# Patient Record
Sex: MALE | Race: WHITE | NOT HISPANIC OR LATINO | Employment: FULL TIME | URBAN - NONMETROPOLITAN AREA
[De-identification: names, ages, dates, MRNs, and addresses within clinical notes are randomized per-mention and may not be internally consistent; named-entity substitution may affect disease eponyms.]

---

## 2017-04-18 ENCOUNTER — TRANSFERRED RECORDS (OUTPATIENT)
Dept: HEALTH INFORMATION MANAGEMENT | Facility: HOSPITAL | Age: 22
End: 2017-04-18

## 2017-04-18 DIAGNOSIS — F41.9 ANXIETY: ICD-10-CM

## 2017-04-19 NOTE — TELEPHONE ENCOUNTER
Celexa     Last Written Prescription Date: 9/16/16  Last Fill Quantity: 90, # refills: 1  Last Office Visit with [facility] [place]:  5/27/16        Last PHQ-9 score on record=   PHQ-9 SCORE 3/15/2016   Total Score -   Total Score 4

## 2017-04-20 ENCOUNTER — TRANSFERRED RECORDS (OUTPATIENT)
Dept: HEALTH INFORMATION MANAGEMENT | Facility: HOSPITAL | Age: 22
End: 2017-04-20

## 2017-04-20 RX ORDER — CITALOPRAM HYDROBROMIDE 20 MG/1
TABLET ORAL
Qty: 90 TABLET | Refills: 0 | Status: SHIPPED | OUTPATIENT
Start: 2017-04-20 | End: 2017-05-03

## 2017-05-03 ENCOUNTER — OFFICE VISIT (OUTPATIENT)
Dept: FAMILY MEDICINE | Facility: OTHER | Age: 22
End: 2017-05-03
Attending: NURSE PRACTITIONER
Payer: COMMERCIAL

## 2017-05-03 VITALS
TEMPERATURE: 98.3 F | HEART RATE: 63 BPM | BODY MASS INDEX: 24.39 KG/M2 | SYSTOLIC BLOOD PRESSURE: 122 MMHG | WEIGHT: 155.4 LBS | OXYGEN SATURATION: 98 % | DIASTOLIC BLOOD PRESSURE: 74 MMHG | HEIGHT: 67 IN | RESPIRATION RATE: 16 BRPM

## 2017-05-03 DIAGNOSIS — Z86.79 HISTORY OF SINUS TACHYCARDIA: ICD-10-CM

## 2017-05-03 DIAGNOSIS — F41.9 ANXIETY: Primary | ICD-10-CM

## 2017-05-03 DIAGNOSIS — F41.0 PANIC ATTACK: ICD-10-CM

## 2017-05-03 LAB
ANION GAP SERPL CALCULATED.3IONS-SCNC: 12 MMOL/L (ref 3–14)
BUN SERPL-MCNC: 12 MG/DL (ref 7–30)
CALCIUM SERPL-MCNC: 9.5 MG/DL (ref 8.5–10.1)
CHLORIDE SERPL-SCNC: 103 MMOL/L (ref 94–109)
CO2 SERPL-SCNC: 24 MMOL/L (ref 20–32)
CREAT SERPL-MCNC: 0.91 MG/DL (ref 0.66–1.25)
GFR SERPL CREATININE-BSD FRML MDRD: NORMAL ML/MIN/1.7M2
GLUCOSE SERPL-MCNC: 89 MG/DL (ref 70–99)
POTASSIUM SERPL-SCNC: 4 MMOL/L (ref 3.4–5.3)
SODIUM SERPL-SCNC: 139 MMOL/L (ref 133–144)
TSH SERPL DL<=0.005 MIU/L-ACNC: 1.68 MU/L (ref 0.4–4)

## 2017-05-03 PROCEDURE — 99214 OFFICE O/P EST MOD 30 MIN: CPT | Performed by: NURSE PRACTITIONER

## 2017-05-03 PROCEDURE — 80048 BASIC METABOLIC PNL TOTAL CA: CPT | Performed by: NURSE PRACTITIONER

## 2017-05-03 PROCEDURE — 36415 COLL VENOUS BLD VENIPUNCTURE: CPT | Performed by: NURSE PRACTITIONER

## 2017-05-03 PROCEDURE — 84443 ASSAY THYROID STIM HORMONE: CPT | Performed by: NURSE PRACTITIONER

## 2017-05-03 RX ORDER — LORAZEPAM 0.5 MG/1
TABLET ORAL
Qty: 30 TABLET | Refills: 0 | Status: SHIPPED | OUTPATIENT
Start: 2017-05-03 | End: 2017-09-11

## 2017-05-03 RX ORDER — CITALOPRAM HYDROBROMIDE 20 MG/1
TABLET ORAL
Qty: 45 TABLET | Refills: 3 | Status: SHIPPED | OUTPATIENT
Start: 2017-05-03 | End: 2017-07-05 | Stop reason: ALTCHOICE

## 2017-05-03 ASSESSMENT — ANXIETY QUESTIONNAIRES
5. BEING SO RESTLESS THAT IT IS HARD TO SIT STILL: NEARLY EVERY DAY
GAD7 TOTAL SCORE: 14
IF YOU CHECKED OFF ANY PROBLEMS ON THIS QUESTIONNAIRE, HOW DIFFICULT HAVE THESE PROBLEMS MADE IT FOR YOU TO DO YOUR WORK, TAKE CARE OF THINGS AT HOME, OR GET ALONG WITH OTHER PEOPLE: VERY DIFFICULT
1. FEELING NERVOUS, ANXIOUS, OR ON EDGE: MORE THAN HALF THE DAYS
7. FEELING AFRAID AS IF SOMETHING AWFUL MIGHT HAPPEN: SEVERAL DAYS
6. BECOMING EASILY ANNOYED OR IRRITABLE: NEARLY EVERY DAY
2. NOT BEING ABLE TO STOP OR CONTROL WORRYING: SEVERAL DAYS
3. WORRYING TOO MUCH ABOUT DIFFERENT THINGS: MORE THAN HALF THE DAYS

## 2017-05-03 ASSESSMENT — PATIENT HEALTH QUESTIONNAIRE - PHQ9: 5. POOR APPETITE OR OVEREATING: MORE THAN HALF THE DAYS

## 2017-05-03 ASSESSMENT — PAIN SCALES - GENERAL: PAINLEVEL: NO PAIN (0)

## 2017-05-03 NOTE — Clinical Note
Referring this nice young man to you, history of SVT - very anxious. Im wondering if the SVT is contributing.

## 2017-05-03 NOTE — PROGRESS NOTES
OUTPATIENT VISIT NOTE      CHIEF COMPLAINT:     Chief Complaint   Patient presents with     Anxiety     increase in anxiety x 2 weeks        SUBJECTIVE  Patient presents with a chief complaint of  Anxiety and panic attacks        HPI:  Symptoms have been present for -  years  Aggravating factors  - social situations  Relieving factors -  med's help  Recent stressors  - no  Drug or alcohol use -  no  Past medications  - see chart  Therapy  - no  Psychiatric history -  As in chart  Prior hospitalization for mental health concerns -  no  Suicidal Ideation or plan  - no      History of SVT, continues with episodic symptoms, is on Toprol XL.    Past Zio, see below    ZIO PATCH REPORT    ORDERING PHYSICIAN:  Livia Schofield MD    INDICATIONS:  Supraventricular tachycardia.    ENROLLMENT PERIOD:  2016 through 2016 (there  are 12-days 8-hours of analysis time).    MONITOR:  ZIO XT Patch    FINDINGS:  Review of the data show the following.  Patient had a  minimum heart rate of 39, an average heart rate of 81 and a maximum  rate of 200 beats per minute.  Review of the tracings show that there  is just some occasional sinus bradycardia, but no evidence of heart  block or pauses.  There is just a rare, very rare premature  ventricular contraction noted. No ventricular tachycardia.  There is a  rare premature atrial contraction.  No obvious supraventricular  tachycardia.  Patient does have frequent bouts of tachycardia, but  they appear to be probably sinus tachycardia rather than  supraventricular tachycardia.  The maximum heart rate was 200 beats  per minute, and this mostly was anywhere from 160 to 180 beats per  minute.  There were a total of 17 triggered events, all of them  showing sinus rhythm to sinus tachycardia.  Patient had no diary  entries.    ASSESSMENT:    ZIO XT Patch Report done for a history of  supraventricular tachycardia, revealing the followin.  Sinus rhythm throughout.  2.   Occasional sinus bradycardia, but no evidence of any heart block  or pauses.  3.  Rare premature ventricular contraction.  No ventricular  tachycardia noted.  4.  Premature atrial contractions noted, but no obvious  supraventricular tachycardia.  5.  Patient had episodes of sinus tachycardia with heart rates in the  160-200 beat range.  6.  There were events of 17 triggered events.  There were no diary  entries.  All of the triggered events appeared to show sinus rhythm to  sinus tachycardia only.    SIGNATURE PAGE ONLY  Exam Date: Jan 19, 2016 01:42:26 PM  Author: TRACIE MILLER  This report is final and signed         Past Medical History:   Diagnosis Date     Chronic rhinitis 5/5/2014     History of sinus tachycardia 2/11/2016     No past surgical history on file.  Family History   Problem Relation Age of Onset     Hypertension Father      Social History     Social History     Marital status: Single     Spouse name: N/A     Number of children: N/A     Years of education: N/A     Occupational History     Not on file.     Social History Main Topics     Smoking status: Passive Smoke Exposure - Never Smoker     Types: Dip, chew, snus or snuff     Smokeless tobacco: Former User     Alcohol use No     Drug use: No     Sexual activity: No     Other Topics Concern     Blood Transfusions Yes     Caffeine Concern Yes     soda, 32oz daily     Social History Narrative     No Known Allergies    Current Outpatient Prescriptions   Medication     citalopram (CELEXA) 20 MG tablet     metoprolol (TOPROL-XL) 25 MG 24 hr tablet     ibuprofen (ADVIL,MOTRIN) 200 MG tablet     No current facility-administered medications for this visit.          REVIEW OF SYSTEMS  Skin: negative  Eyes: negative  Ears/Nose/Throat: negative  Respiratory: No shortness of breath, dyspnea on exertion, cough, or hemoptysis  Cardiovascular: increased HR  Gastrointestinal: negative  Musculoskeletal: negative  Neurologic: negative  Psychiatric: As  "above  Endocrine: negative      OBJECTIVE:   /74 (BP Location: Right arm, Patient Position: Chair, Cuff Size: Adult Regular)  Pulse 63  Temp 98.3  F (36.8  C) (Tympanic)  Resp 16  Ht 5' 7\" (1.702 m)  Wt 155 lb 6.4 oz (70.5 kg)  SpO2 98%  BMI 24.34 kg/m2    Mental Status Assessment:  -Appearance/Behavior:No apparent distress and Casually groomed, attitude:pleasant and cooperative  -Motor: normal or unremarkable.  -Gait: Normal.   -Abnormal involuntary movements: None.  -Mood: description consistent with euthymia.  -Affect: Reactive/Full range.  -Speech: Normal, clear, regular rate and volume.   -Thought process/associations: Logical and Goal directed.  -Thought content: normal .  -Perceptual disturbances: No hallucinations..   -Suicidal/Homicidal Ideation: Denies  -Judgment: Good.  -Insight: Good.  *Orientation: time, place and person.  *Memory: intact immediate, short and long term.  *Attention: Adequate for interview  *Language: fluent, no aphasias, able to repeat phrases and name objects. Vocab intact.  *Fund of information: appropriate for education  *Cognitive functioning estimate: 0 - independent.        Physical Exam:  Head: Normocephalic.   Neck: Neck supple. No adenopathy.   ENT: ENT exam normal, no neck nodes or sinus tenderness  Cardiovascular: negative, PMI normal.  No murmurs, clicks gallops or rub  Respiratory:  Good diaphragmatic excursion. Lungs clear  Abdomen: Soft, non tender, active bowel sounds  Neurologic: Gait normal.  Sensation grossly  Skin: Normal, no cuts, or other notable abnormal findings    PHQ-9 SCORE 2/19/2016 3/15/2016 5/3/2017   Total Score - - -   Total Score 16 4 18     AZRA-7 SCORE 5/3/2017   Total Score 14           ASSESSMENT AND PLAN    1. Anxiety  - citalopram (CELEXA) 20 MG tablet; Take 1.5 tabs po daily  Dispense: 45 tablet; Refill: 3  - LORazepam (ATIVAN) 0.5 MG tablet; 1/2 - 1 po daily PRN panic attack  Dispense: 30 tablet; Refill: 0    2. Panic attack  - " LORazepam (ATIVAN) 0.5 MG tablet; 1/2 - 1 po daily PRN panic attack  Dispense: 30 tablet; Refill: 0    3. History of sinus tachycardia  - INTERNAL MEDICINE REFERRAL  - Continue Toprol -XL    BMP and TSH ordered         Althea HERRERA  [phone]

## 2017-05-03 NOTE — MR AVS SNAPSHOT
After Visit Summary   5/3/2017    Lamberto Poole    MRN: [id number]           Patient Information     Date Of Birth          1995        Visit Information        Provider Department      5/3/2017 9:30 AM Althea Barragan NP [facility]        Today's Diagnoses     Anxiety    -  1    Panic attack        History of sinus tachycardia          Care Instructions        ASSESSMENT AND PLAN    1. Anxiety  - citalopram (CELEXA) 20 MG tablet; Take 1.5 tabs po daily  Dispense: 45 tablet; Refill: 3  - LORazepam (ATIVAN) 0.5 MG tablet; 1/2 - 1 po daily PRN panic attack  Dispense: 30 tablet; Refill: 0    2. Panic attack    - LORazepam (ATIVAN) 0.5 MG tablet; 1/2 - 1 po daily PRN panic attack  Dispense: 30 tablet; Refill: 0    3. History of sinus tachycardia  - INTERNAL MEDICINE REFERRAL  - Continue Toprol -XL        Althea Barragan [facility]  [phone]                           Follow-ups after your visit        Additional Services     INTERNAL MEDICINE REFERRAL       Your provider has referred you to:  Symptomatic SVT,  Anxiety, has had Zio, and cardio consult (seemedia tab) ablation discussed, wondering what your thoughts are      Please be aware that coverage of these services is subject to the terms and limitations of your health insurance plan.  Call member services at your health plan with any benefit or coverage questions.      Please bring the following to your appointment:  >>   Any x-rays, CTs or MRIs which have been performed.  Contact the facility where they were done to arrange for  prior to your scheduled appointment.   >>   List of current medications   >>   This referral request   >>   Any documents/labs given to you for this referral                  Your next 10 appointments already scheduled     May 04, 2017 11:15 AM CDT   (Arrive by 11:00 AM)   SHORT with Livia Banerjee MD   [facility] ([facility] )    [address]  [facility] [address]  "  [phone]              Who to contact     If you have questions or need follow up information about today's clinic visit or your schedule please contact [facility] directly at [phone].  Normal or non-critical lab and imaging results will be communicated to you by MyChart, letter or phone within 4 business days after the clinic has received the results. If you do not hear from us within 7 days, please contact the clinic through MyChart or phone. If you have a critical or abnormal lab result, we will notify you by phone as soon as possible.  Submit refill requests through [employer] or call your pharmacy and they will forward the refill request to us. Please allow 3 business days for your refill to be completed.          Additional Information About Your Visit        [facility] Information     [employer] lets you send messages to your doctor, view your test results, renew your prescriptions, schedule appointments and more. To sign up, go to [email or web address] . Click on \"Log in\" on the left side of the screen, which will take you to the Welcome page. Then click on \"Sign up Now\" on the right side of the page.     You will be asked to enter the access code listed below, as well as some personal information. Please follow the directions to create your username and password.     Your access code is: [email or web address]  Expires: 2017 10:16 AM     Your access code will  in 90 days. If you need help or a new code, please call your [facility] or [phone].        Care EveryWhere ID     This is your Care EveryWhere ID. This could be used by other organizations to access your [facility] medical records  [id number]        Your Vitals Were     Pulse Temperature Respirations Height Pulse Oximetry BMI (Body Mass Index)    63 98.3  F (36.8  C) (Tympanic) 16 5' 7\" (1.702 m) 98% 24.34 kg/m2       Blood Pressure from Last 3 Encounters:   17 122/74   16 108/70   03/15/16 104/70    Weight from " Last 3 Encounters:   05/03/17 155 lb 6.4 oz (70.5 kg)   05/27/16 144 lb 6.4 oz (65.5 kg)   03/15/16 135 lb (61.2 kg) (18 %)*     * Growth percentiles are based on [facility][note 2] 2-20 Years data.              We Performed the Following     INTERNAL MEDICINE REFERRAL          Today's Medication Changes          These changes are accurate as of: 5/3/17 10:16 AM.  If you have any questions, ask your nurse or doctor.               Start taking these medicines.        Dose/Directions    LORazepam 0.5 MG tablet   Commonly known as:  ATIVAN   Used for:  Anxiety, Panic attack   Started by:  Althea Barragan NP        1/2 - 1 po daily PRN panic attack   Quantity:  30 tablet   Refills:  0         These medicines have changed or have updated prescriptions.        Dose/Directions    citalopram 20 MG tablet   Commonly known as:  celeXA   This may have changed:  See the new instructions.   Used for:  Anxiety   Changed by:  Althea Barragan NP        Take 1.5 tabs po daily   Quantity:  45 tablet   Refills:  3            Where to get your medicines      These medications were sent to [facility] [id number] [place] [address]  AT [facility] [address]  [address] [facility] [address]     Phone:  [phone]     citalopram 20 MG tablet         Some of these will need a paper prescription and others can be bought over the counter.  Ask your nurse if you have questions.     Bring a paper prescription for each of these medications     LORazepam 0.5 MG tablet                Primary Care Provider Office Phone # Fax #    Livia Banerjee [phone]       [facility] [address] [facility] [address]        Thank you!     Thank you for choosing [facility]  for your care. Our goal is always to provide you with excellent care. Hearing back from our patients is one way we can continue to improve our services. Please take a few minutes to complete the written survey  that you may receive in the mail after your visit with us. Thank you!             Your Updated Medication List - Protect others around you: Learn how to safely use, store and throw away your medicines at www.disposemymeds.org.          This list is accurate as of: 5/3/17 10:16 AM.  Always use your most recent med list.                   Brand Name Dispense Instructions for use    citalopram 20 MG tablet    celeXA    45 tablet    Take 1.5 tabs po daily       ibuprofen 200 MG tablet    ADVIL/MOTRIN     Take 200 mg by mouth Reported on 5/3/2017       LORazepam 0.5 MG tablet    ATIVAN    30 tablet    1/2 - 1 po daily PRN panic attack       metoprolol 25 MG 24 hr tablet    TOPROL-XL    15 tablet    Take 0.5 tablets (12.5 mg) by mouth daily

## 2017-05-03 NOTE — NURSING NOTE
"Chief Complaint   Patient presents with     Anxiety     increase in anxiety x 2 weeks       Initial /74 (BP Location: Right arm, Patient Position: Chair, Cuff Size: Adult Regular)  Pulse 63  Temp 98.3  F (36.8  C) (Tympanic)  Resp 16  Ht 5' 7\" (1.702 m)  Wt 155 lb 6.4 oz (70.5 kg)  SpO2 98%  BMI 24.34 kg/m2 Estimated body mass index is 24.34 kg/(m^2) as calculated from the following:    Height as of this encounter: 5' 7\" (1.702 m).    Weight as of this encounter: 155 lb 6.4 oz (70.5 kg).  Medication Reconciliation: complete   Hailey Sheth    "

## 2017-05-03 NOTE — PATIENT INSTRUCTIONS
ASSESSMENT AND PLAN    1. Anxiety  - citalopram (CELEXA) 20 MG tablet; Take 1.5 tabs po daily  Dispense: 45 tablet; Refill: 3  - LORazepam (ATIVAN) 0.5 MG tablet; 1/2 - 1 po daily PRN panic attack  Dispense: 30 tablet; Refill: 0    2. Panic attack    - LORazepam (ATIVAN) 0.5 MG tablet; 1/2 - 1 po daily PRN panic attack  Dispense: 30 tablet; Refill: 0    3. History of sinus tachycardia  - INTERNAL MEDICINE REFERRAL  - Continue Toprol -XL        Althea Barragan [facility]  [phone]

## 2017-05-04 ASSESSMENT — ANXIETY QUESTIONNAIRES: GAD7 TOTAL SCORE: 14

## 2017-05-04 ASSESSMENT — PATIENT HEALTH QUESTIONNAIRE - PHQ9: SUM OF ALL RESPONSES TO PHQ QUESTIONS 1-9: 18

## 2017-05-08 ENCOUNTER — OFFICE VISIT (OUTPATIENT)
Dept: CARDIOLOGY | Facility: OTHER | Age: 22
End: 2017-05-08
Attending: INTERNAL MEDICINE
Payer: COMMERCIAL

## 2017-05-08 ENCOUNTER — OFFICE VISIT (OUTPATIENT)
Dept: INTERNAL MEDICINE | Facility: OTHER | Age: 22
End: 2017-05-08
Attending: NURSE PRACTITIONER
Payer: COMMERCIAL

## 2017-05-08 VITALS
TEMPERATURE: 97.8 F | HEIGHT: 67 IN | RESPIRATION RATE: 16 BRPM | WEIGHT: 151 LBS | SYSTOLIC BLOOD PRESSURE: 114 MMHG | BODY MASS INDEX: 23.7 KG/M2 | OXYGEN SATURATION: 97 % | HEART RATE: 85 BPM | DIASTOLIC BLOOD PRESSURE: 68 MMHG

## 2017-05-08 VITALS
HEIGHT: 67 IN | DIASTOLIC BLOOD PRESSURE: 64 MMHG | WEIGHT: 155 LBS | OXYGEN SATURATION: 97 % | TEMPERATURE: 96.4 F | HEART RATE: 67 BPM | BODY MASS INDEX: 24.33 KG/M2 | SYSTOLIC BLOOD PRESSURE: 108 MMHG

## 2017-05-08 DIAGNOSIS — I47.10 SVT (SUPRAVENTRICULAR TACHYCARDIA) (H): Primary | ICD-10-CM

## 2017-05-08 DIAGNOSIS — F41.9 ANXIETY: Primary | ICD-10-CM

## 2017-05-08 DIAGNOSIS — Z86.79 HISTORY OF SINUS TACHYCARDIA: ICD-10-CM

## 2017-05-08 DIAGNOSIS — I47.10 SVT (SUPRAVENTRICULAR TACHYCARDIA) (H): ICD-10-CM

## 2017-05-08 PROCEDURE — 99204 OFFICE O/P NEW MOD 45 MIN: CPT | Performed by: INTERNAL MEDICINE

## 2017-05-08 PROCEDURE — 99243 OFF/OP CNSLTJ NEW/EST LOW 30: CPT | Performed by: INTERNAL MEDICINE

## 2017-05-08 PROCEDURE — 93000 ELECTROCARDIOGRAM COMPLETE: CPT | Performed by: INTERNAL MEDICINE

## 2017-05-08 RX ORDER — METOPROLOL SUCCINATE 25 MG/1
37.5 TABLET, EXTENDED RELEASE ORAL DAILY
Qty: 45 TABLET | Refills: 3 | Status: SHIPPED | OUTPATIENT
Start: 2017-05-08 | End: 2017-07-05

## 2017-05-08 RX ORDER — METOPROLOL SUCCINATE 25 MG/1
25 TABLET, EXTENDED RELEASE ORAL
COMMUNITY
Start: 2017-01-31 | End: 2017-05-08

## 2017-05-08 ASSESSMENT — ANXIETY QUESTIONNAIRES
IF YOU CHECKED OFF ANY PROBLEMS ON THIS QUESTIONNAIRE, HOW DIFFICULT HAVE THESE PROBLEMS MADE IT FOR YOU TO DO YOUR WORK, TAKE CARE OF THINGS AT HOME, OR GET ALONG WITH OTHER PEOPLE: EXTREMELY DIFFICULT
6. BECOMING EASILY ANNOYED OR IRRITABLE: MORE THAN HALF THE DAYS
5. BEING SO RESTLESS THAT IT IS HARD TO SIT STILL: NEARLY EVERY DAY
1. FEELING NERVOUS, ANXIOUS, OR ON EDGE: MORE THAN HALF THE DAYS
GAD7 TOTAL SCORE: 16
3. WORRYING TOO MUCH ABOUT DIFFERENT THINGS: NEARLY EVERY DAY
2. NOT BEING ABLE TO STOP OR CONTROL WORRYING: MORE THAN HALF THE DAYS
7. FEELING AFRAID AS IF SOMETHING AWFUL MIGHT HAPPEN: MORE THAN HALF THE DAYS

## 2017-05-08 ASSESSMENT — PAIN SCALES - GENERAL
PAINLEVEL: NO PAIN (0)
PAINLEVEL: NO PAIN (0)

## 2017-05-08 ASSESSMENT — PATIENT HEALTH QUESTIONNAIRE - PHQ9: 5. POOR APPETITE OR OVEREATING: MORE THAN HALF THE DAYS

## 2017-05-08 NOTE — PATIENT INSTRUCTIONS
You were seen by Dr. Rose 5/8/2017    1. You will increase Metoprolol to 37.5 mg daily 1.5 tabs daily    2. You will be scheduled for a cardiac event monitor which you will wear for 30 days.     3. You will follow up with Dr. Rose in 6 weeks.     4. Decrease caffeine in the diet.     5. Reduce stress    6. Please call the cardiology RN with problems, questions, concerns at [phone]    Luz GU RN-BSN  Cardiology   [facility]  [phone]

## 2017-05-08 NOTE — PROGRESS NOTES
Cardiology Consultation     Date of Admission:  (Not on file)    Assessment & Plan   Lamberto Poole is a 21 year old male who he is being seen by cardiology for on symptoms of palpitations.    #1.  He will be seen in approximately 6 week in follow up to a event monitor that will be placed. The results will be provided to him at that time.    #2.  He will increase his metoprolol to 25 mg daily to 37.5 mg daily.    #3. His EKG today shows sinus rhythm.    #4.  He was told to reduce his caffeine intake. He currently is drinking approximately 2 12 oz  pops a day. He was told to reduce the amount or caffeine intake.       Garrick NATIONMichelle Milton    Code Status    [unfilled]    Reason for Consult   Reason for consult: Palpitations with concern for SVT    Primary Care Physician   Althea Barragan    Chief Complaint   Palpitations     History is obtained from the patient    History of Present Illness   Lamberto Poole is a 21 year old male who presents with palpitations.  He reports a history of palpitations since approximately the summer of 2015. [place] he started to feel lightheaded and faint.  He thinks he actually passed out.  He was subsequently seen in the hospital and worked up.  He was seen by cardiology in [place]. He had a tilt table test, echocardiogram, and multiple heart monitors.  He has had a Holter monitor and his ZIO patch. More recently, his Zio patch was placed in January 2016.  He had 17 triggered events which corresponded to sinus rhythm and sinus tachycardia.  Minimal other irregularity were noted.  He was also placed on Toprol 12.5 mg daily with minimal improvement of his symptoms.  He did drink significant amounts of caffeine in the form of a pop. He has since cut down on his caffeine intake but he continues to use caffeine.  He does not smoke, denies alcohol, denies drugs, but admits to significant anxiety.  It is uncertain if this is related to anxiety versus dysrhythmia.  He  recently had his TSH check and this was normal.  He is taking Cymbalta presently for  Anxiety and the Toprol as noted.  However, he is uncertain if one of these is beneficial in improving his symptoms.    He continues to have palpitations.  He reports that these happen a couple times a month.  He has had a stressful job as a  in [place], [place]. He has recently quit.  His mother is with him today and states he is very anxious about many things.  There is concern that his symptoms are anxiety related.    Past Medical History   I have reviewed this patient's medical history and updated it with pertinent information if needed.   Past Medical History:   Diagnosis Date     Chronic rhinitis 5/5/2014     History of sinus tachycardia 2/11/2016       Past Surgical History   I have reviewed this patient's surgical history and updated it with pertinent information if needed.  History reviewed. No pertinent surgical history.    Prior to Admission Medications   Cannot display prior to admission medications because the patient has not been admitted in this contact.     Allergies   No Known Allergies    Social History   I have reviewed this patient's social history and updated it with pertinent information if needed. Lamberto T Virgilio  reports that he has never smoked. He has quit using smokeless tobacco. He reports that he does not drink alcohol or use illicit drugs.    Family History   I have reviewed this patient's family history and updated it with pertinent information if needed.       Family History   Problem Relation Age of Onset     Hypertension Father      Heart Murmur Father        Review of Systems   CONSTITUTIONAL:  negative  EYES:  negative  HEENT:  negative  RESPIRATORY:  negative  CARDIOVASCULAR:  Palpitations/racing heart  GASTROINTESTINAL:  negative  GENITOURINARY: deferred  INTEGUMENT/BREAST:  deferred  HEMATOLOGIC/LYMPHATIC:  deferred  ALLERGIC/IMMUNOLOGIC:  deferred  ENDOCRINE:  deferred  MUSCULOSKELETAL:   deferred  NEUROLOGICAL:  negative  BEHAVIOR/PSYCH:  anxiety    Physical Exam   Temp: 97.8  F (36.6  C) Temp src: Tympanic BP: 114/68 Pulse: 85   Resp: 16 SpO2: 97 %      Vital Signs with Ranges  Temp:  [96.4  F (35.8  C)-97.8  F (36.6  C)] 97.8  F (36.6  C)  Pulse:  [67-85] 85  Resp:  [16] 16  BP: (108-114)/(64-68) 114/68  SpO2:  [97 %] 97 %  151 lbs 0 oz    Constitutional: awake, alert, cooperative, no apparent distress, and appears stated age  Eyes: Lids and lashes normal,  sclera clear, conjunctiva normal  ENT: Normocephalic, without obvious abnormality, atraumatic  Hematologic / Lymphatic: deferred  Respiratory: No increased work of breathing, good air exchange, clear to auscultation bilaterally, no crackles or wheezing  Cardiovascular: Normal apical impulse, regular rate and rhythm, normal S1 and S2, no S3 or S4, and no murmur noted  GI: No scars, normal bowel sounds, soft, non-distended  Genitounirinary: defferred  Skin: deferred  Musculoskeletal: deferred  Neurologic: deferred  Neuropsychiatric: deferred    Data   I personally reviewed the EKG tracing showing sinus rhythm.

## 2017-05-08 NOTE — MR AVS SNAPSHOT
After Visit Summary   5/8/2017    Lamberto Poole    MRN: [id number]           Patient Information     Date Of Birth          1995        Visit Information        Provider Department      5/8/2017 2:00 PM Garrick Rose, DO [facility]        Today's Diagnoses     SVT (supraventricular tachycardia) (H)          Care Instructions    You were seen by Dr. Rose 5/8/2017    1. You will increase Metoprolol to 37.5 mg daily 1.5 tabs daily    2. You will be scheduled for a cardiac event monitor which you will wear for 30 days.     3. You will follow up with Dr. Rose in 6 weeks.     4. Decrease caffeine in the diet.     5. Reduce stress    6. Please call the cardiology RN with problems, questions, concerns at [phone]    Luz GU RN-BSN  Cardiology   [facility]  [phone]          Follow-ups after your visit        Your next 10 appointments already scheduled     Jun 06, 2017  8:15 AM CDT   (Arrive by 8:00 AM)   SHORT with Althea Barragan NP   [facility] ([facility] )    [address] [place] [address]   [phone]              Who to contact     If you have questions or need follow up information about today's clinic visit or your schedule please contact [facility] directly at [phone].  Normal or non-critical lab and imaging results will be communicated to you by Bridgevinehart, letter or phone within 4 business days after the clinic has received the results. If you do not hear from us within 7 days, please contact the clinic through Bridgevinehart or phone. If you have a critical or abnormal lab result, we will notify you by phone as soon as possible.  Submit refill requests through Signostics or call your pharmacy and they will forward the refill request to us. Please allow 3 business days for your refill to be completed.          Additional Information About Your Visit        Bridgevinehart Information     Signostics lets you send  "messages to your doctor, view your test results, renew your prescriptions, schedule appointments and more. To sign up, go to [email or web address] . Click on \"Log in\" on the left side of the screen, which will take you to the Welcome page. Then click on \"Sign up Now\" on the right side of the page.     You will be asked to enter the access code listed below, as well as some personal information. Please follow the directions to create your username and password.     Your access code is: [email or web address]  Expires: 2017 10:16 AM     Your access code will  in 90 days. If you need help or a new code, please call your [place] clinic or [phone].        Care EveryWhere ID     This is your [facility] EveryWhere ID. This could be used by other organizations to access your [place] medical records  [id number]        Your Vitals Were     Pulse Temperature Respirations Height Pulse Oximetry BMI (Body Mass Index)    85 97.8  F (36.6  C) (Tympanic) 16 1.702 m (5' 7\") 97% 23.65 kg/m2       Blood Pressure from Last 3 Encounters:   17 114/68   17 108/64   17 122/74    Weight from Last 3 Encounters:   17 68.5 kg (151 lb)   17 70.3 kg (155 lb)   17 70.5 kg (155 lb 6.4 oz)              Today, you had the following     No orders found for display       Primary Care Provider Office Phone # Fax #    Althea Barragan [phone]       [facility] [address] [address]        Thank you!     Thank you for choosing [facility]  for your care. Our goal is always to provide you with excellent care. Hearing back from our patients is one way we can continue to improve our services. Please take a few minutes to complete the written survey that you may receive in the mail after your visit with us. Thank you!             Your Updated Medication List - Protect others around you: Learn how to safely use, store and throw away your medicines at www.disposemymeds.org. "          This list is accurate as of: 5/8/17  2:42 PM.  Always use your most recent med list.                   Brand Name Dispense Instructions for use    citalopram 20 MG tablet    celeXA    45 tablet    Take 1.5 tabs po daily       LORazepam 0.5 MG tablet    ATIVAN    30 tablet    1/2 - 1 po daily PRN panic attack       * metoprolol 25 MG 24 hr tablet    TOPROL-XL    15 tablet    Take 0.5 tablets (12.5 mg) by mouth daily       * metoprolol 25 MG 24 hr tablet    TOPROL-XL     Take 25 mg by mouth       * Notice:  This list has 2 medication(s) that are the same as other medications prescribed for you. Read the directions carefully, and ask your doctor or other care provider to review them with you.

## 2017-05-08 NOTE — NURSING NOTE
"Chief Complaint   Patient presents with     Consult     referral from Dr. Wagner, prior patient of Dr. Fofana, cardiology at [facility] in [place], [facility], patient states that he \"had a few anxiety attacks last week which makes his heart pound\".        Initial /68 (BP Location: Right arm, Cuff Size: Adult Large)  Pulse 85  Temp 97.8  F (36.6  C) (Tympanic)  Resp 16  Ht 1.702 m (5' 7\")  Wt 68.5 kg (151 lb)  SpO2 97%  BMI 23.65 kg/m2 Estimated body mass index is 23.65 kg/(m^2) as calculated from the following:    Height as of this encounter: 1.702 m (5' 7\").    Weight as of this encounter: 68.5 kg (151 lb).  Medication Reconciliation: complete   Rachel Mock      "

## 2017-05-08 NOTE — MR AVS SNAPSHOT
After Visit Summary   5/8/2017    Lamberto Poole    MRN: [id number]           Patient Information     Date Of Birth          1995        Visit Information        Provider Department      5/8/2017 10:30 AM Ellis Higgins DO [facility]        Today's Diagnoses     SVT (supraventricular tachycardia) (H)    -  1    History of sinus tachycardia           Follow-ups after your visit        Additional Services     CARDIOLOGY EVAL ADULT REFERRAL       Your provider has referred you to:  Dr Milton Ontiveros : SVT question of EP study and possible ablation       Please be aware that coverage of these services is subject to the terms and limitations of your health insurance plan.  Call member services at your health plan with any benefit or coverage questions.      Type of Referral:  New Cardiology Consult    Timeframe requested:  Less than 1 week    Please bring the following to your appointment:  >>   Any x-rays, CTs or MRIs which have been performed.  Contact the facility where they were done to arrange for  prior to your scheduled appointment.    >>   List of current medications  >>   This referral request   >>   Any documents/labs given to you for this referral                  Your next 10 appointments already scheduled     Jun 06, 2017  8:15 AM CDT   (Arrive by 8:00 AM)   SHORT with Althea Barragan NP   [facility] ([facility] )    [address]  [facility] [address]   [phone]              Who to contact     If you have questions or need follow up information about today's clinic visit or your schedule please contact [facility] directly at [phone].  Normal or non-critical lab and imaging results will be communicated to you by MyChart, letter or phone within 4 business days after the clinic has received the results. If you do not hear from us within 7 days, please contact the clinic through MyChart or phone. If you  "have a critical or abnormal lab result, we will notify you by phone as soon as possible.  Submit refill requests through twtMob or call your pharmacy and they will forward the refill request to us. Please allow 3 business days for your refill to be completed.          Additional Information About Your Visit        CONEXANCE MDhart Information     twtMob lets you send messages to your doctor, view your test results, renew your prescriptions, schedule appointments and more. To sign up, go to [email or web address] . Click on \"Log in\" on the left side of the screen, which will take you to the Welcome page. Then click on \"Sign up Now\" on the right side of the page.     You will be asked to enter the access code listed below, as well as some personal information. Please follow the directions to create your username and password.     Your access code is: [email or web address]  Expires: 2017 10:16 AM     Your access code will  in 90 days. If you need help or a new code, please call your [facility] clinic or [phone].        Care EveryWhere ID     This is your Care EveryWhere ID. This could be used by other organizations to access your [place] medical records  [id number]        Your Vitals Were     Pulse Temperature Height Pulse Oximetry BMI (Body Mass Index)       67 96.4  F (35.8  C) (Tympanic) 5' 7\" (1.702 m) 97% 24.28 kg/m2        Blood Pressure from Last 3 Encounters:   17 108/64   17 122/74   16 108/70    Weight from Last 3 Encounters:   17 155 lb (70.3 kg)   17 155 lb 6.4 oz (70.5 kg)   16 144 lb 6.4 oz (65.5 kg)              We Performed the Following     CARDIOLOGY EVAL ADULT REFERRAL        Primary Care Provider Office Phone # Fax #    Althea Barragan [phone]       [address] [address]        Thank you!     Thank you for choosing [facility]  for your care. Our goal is always to provide you with excellent care. " Hearing back from our patients is one way we can continue to improve our services. Please take a few minutes to complete the written survey that you may receive in the mail after your visit with us. Thank you!             Your Updated Medication List - Protect others around you: Learn how to safely use, store and throw away your medicines at www.disposemymeds.org.          This list is accurate as of: 5/8/17 10:49 AM.  Always use your most recent med list.                   Brand Name Dispense Instructions for use    citalopram 20 MG tablet    celeXA    45 tablet    Take 1.5 tabs po daily       LORazepam 0.5 MG tablet    ATIVAN    30 tablet    1/2 - 1 po daily PRN panic attack       metoprolol 25 MG 24 hr tablet    TOPROL-XL    15 tablet    Take 0.5 tablets (12.5 mg) by mouth daily

## 2017-05-08 NOTE — PROGRESS NOTES
Internal Medicine:    Chief Complaint   Patient presents with     Palpitations     pt here for consultation for sinus tachacardia, no chest pain, no sob, history of headaches , pt states feels palpations more when he gets stressed out      Lamberto presents today for consultation and questions regarding his history of SVT and sinus tachycardia.  Lamberto was diagnosed with SVT in the past and was then prescribed beta blocker for symptomatic controlled.  He still feels as though he has episodes of SVT but there also appears to be some underlying anxiety and findings more consistent with sinus tachycardia.  He was seen by Cardiology in the past at [facility] and was placed on Beta blocker and an discussion surrounding EP study was had but he has not had one of these studies completed.  Lamberto feels as though he does have SVT and would like to proceed with a EP study and ablation.  He denies any chest pain or SOB.  Episodes seem to happen when he is under stress.  He is aware of vagal maneuvers to agustin these episodes.  In review of most recent Holter from early 2016 it appears he had sinus tachycardia but SVT was not noted.  In the context of his anxiety there is ongoing question as to the degree of SVT vs. Simple anxiety driven sinus tachycardia.           Patient Active Problem List   Diagnosis     Chronic rhinitis     Anxiety     History of sinus tachycardia     ACP (advance care planning)            Past Medical History:   Diagnosis Date     Chronic rhinitis 5/5/2014     History of sinus tachycardia 2/11/2016          History reviewed. No pertinent surgical history.         Social History   Substance Use Topics     Smoking status: Passive Smoke Exposure - Never Smoker     Types: Dip, chew, snus or snuff     Smokeless tobacco: Former User     Alcohol use No            Family History   Problem Relation Age of Onset     Hypertension Father              No Known Allergies         Current Outpatient Prescriptions   Medication  "Sig Dispense Refill     citalopram (CELEXA) 20 MG tablet Take 1.5 tabs po daily 45 tablet 3     LORazepam (ATIVAN) 0.5 MG tablet 1/2 - 1 po daily PRN panic attack 30 tablet 0     metoprolol (TOPROL-XL) 25 MG 24 hr tablet Take 0.5 tablets (12.5 mg) by mouth daily 15 tablet 1       Review Of Systems:    Respiratory: No shortness of breath, dyspnea on exertion, cough, or hemoptysis  Cardiovascular: negative  Gastrointestinal: negative  Psychiatric: anxiety  Endocrine: negative    Objective:   /64 (BP Location: Right arm, Patient Position: Chair, Cuff Size: Adult Large)  Pulse 67  Temp 96.4  F (35.8  C) (Tympanic)  Ht 5' 7\" (1.702 m)  Wt 155 lb (70.3 kg)  SpO2 97%  BMI 24.28 kg/m2  EXAM:  Constitutional: healthy, alert and no distress   Cardiovascular: RRR. No murmurs, clicks gallops or rub  Respiratory:Lungs clear  Psychiatric: mentation appears normal and affect normal/bright  Head: Normocephalic. No masses, lesions, tenderness or abnormalities  Abdomen: Abdomen soft, non-tender. BS normal. No masses, organomegaly  NEURO: No focal deficits   SKIN: no suspicious lesions or rashes       Orders placed or performed in visit on 05/03/17     citalopram (CELEXA) 20 MG tablet     LORazepam (ATIVAN) 0.5 MG tablet       Assessment and Plan:    (I47.1) SVT (supraventricular tachycardia) (H)  (primary encounter diagnosis)  Comment: As above per report of SVT accompanied by sinus tachycardia and anxiety.    Plan: CARDIOLOGY EVAL ADULT REFERRAL      (Z86.79) History of sinus tachycardia  Comment: As above   Plan: As above     25 minutes was spent on this patients care today.  Greater than 50% of the time was spent face to face with the patient and his mother answering numerous questions and reviewing previous tests and documentation from the past couple years.  Plan discussed and they agree to see cardiology as to the necessity, risks and benefits of a possible EP study.      Ellis Higgins, DO     CC: Althea " [place]

## 2017-05-08 NOTE — NURSING NOTE
"Chief Complaint   Patient presents with     Palpitations     pt here for consultation for sinus tachacardia, no chest pain, no sob, history of headaches        Initial /64 (BP Location: Right arm, Patient Position: Chair, Cuff Size: Adult Large)  Pulse 67  Temp 96.4  F (35.8  C) (Tympanic)  Ht 5' 7\" (1.702 m)  Wt 155 lb (70.3 kg)  SpO2 97%  BMI 24.28 kg/m2 Estimated body mass index is 24.28 kg/(m^2) as calculated from the following:    Height as of this encounter: 5' 7\" (1.702 m).    Weight as of this encounter: 155 lb (70.3 kg).  Medication Reconciliation: complete   Rose Mary Durán LPN      "

## 2017-05-09 ASSESSMENT — ANXIETY QUESTIONNAIRES: GAD7 TOTAL SCORE: 16

## 2017-05-11 ENCOUNTER — HOSPITAL ENCOUNTER (OUTPATIENT)
Dept: NUCLEAR MEDICINE | Facility: HOSPITAL | Age: 22
Discharge: HOME | End: 2017-05-11
Attending: INTERNAL MEDICINE | Admitting: INTERNAL MEDICINE
Payer: COMMERCIAL

## 2017-05-11 PROCEDURE — 93270 REMOTE 30 DAY ECG REV/REPORT: CPT | Mod: TC

## 2017-05-11 PROCEDURE — 93272 ECG/REVIEW INTERPRET ONLY: CPT | Performed by: INTERNAL MEDICINE

## 2017-06-21 ENCOUNTER — OFFICE VISIT (OUTPATIENT)
Dept: FAMILY MEDICINE | Facility: OTHER | Age: 22
End: 2017-06-21
Attending: NURSE PRACTITIONER
Payer: COMMERCIAL

## 2017-06-21 VITALS
RESPIRATION RATE: 16 BRPM | WEIGHT: 150.6 LBS | HEIGHT: 67 IN | DIASTOLIC BLOOD PRESSURE: 76 MMHG | TEMPERATURE: 97.9 F | SYSTOLIC BLOOD PRESSURE: 122 MMHG | OXYGEN SATURATION: 98 % | BODY MASS INDEX: 23.64 KG/M2 | HEART RATE: 73 BPM

## 2017-06-21 DIAGNOSIS — H65.192 OTHER ACUTE NONSUPPURATIVE OTITIS MEDIA OF LEFT EAR: Primary | ICD-10-CM

## 2017-06-21 PROCEDURE — 99213 OFFICE O/P EST LOW 20 MIN: CPT | Performed by: NURSE PRACTITIONER

## 2017-06-21 PROCEDURE — 99212 OFFICE O/P EST SF 10 MIN: CPT

## 2017-06-21 RX ORDER — AMOXICILLIN 875 MG
875 TABLET ORAL 2 TIMES DAILY
Qty: 20 TABLET | Refills: 0 | Status: SHIPPED | OUTPATIENT
Start: 2017-06-21 | End: 2017-07-01

## 2017-06-21 ASSESSMENT — PAIN SCALES - GENERAL: PAINLEVEL: MILD PAIN (2)

## 2017-06-21 NOTE — MR AVS SNAPSHOT
"              After Visit Summary   6/21/2017    Lamberto Poole    MRN: [id number]           Patient Information     Date Of Birth          1995        Visit Information        Provider Department      6/21/2017 4:00 PM Katie Armas NP [facility]        Today's Diagnoses     Other acute nonsuppurative otitis media of left ear    -  1      Care Instructions      ASSESSMENT/PLAN:  1. Other acute nonsuppurative otitis media of left ear  symptomatic  - amoxicillin (AMOXIL) 875 MG tablet; Take 1 tablet (875 mg) by mouth 2 times daily for 10 days  Dispense: 20 tablet; Refill: 0  Tylenol or ibuprofen as needed for pain of rever  - consider over the counter antihistamine - claritin, allegra or zyrtec per package directions.     Increase fluids and rest.   Follow up if symptoms do not improve or worsen    Katie Armas,   Certified Adult Nurse Practitioner  [phone]          Follow-ups after your visit        Who to contact     If you have questions or need follow up information about today's clinic visit or your schedule please contact [facility] directly at [phone].  Normal or non-critical lab and imaging results will be communicated to you by MyChart, letter or phone within 4 business days after the clinic has received the results. If you do not hear from us within 7 days, please contact the clinic through Yardsalehart or phone. If you have a critical or abnormal lab result, we will notify you by phone as soon as possible.  Submit refill requests through Nevro or call your pharmacy and they will forward the refill request to us. Please allow 3 business days for your refill to be completed.          Additional Information About Your Visit        MyChart Information     Nevro lets you send messages to your doctor, view your test results, renew your prescriptions, schedule appointments and more. To sign up, go to [email or web address] . Click on \"Log in\" on the " "left side of the screen, which will take you to the Welcome page. Then click on \"Sign up Now\" on the right side of the page.     You will be asked to enter the access code listed below, as well as some personal information. Please follow the directions to create your username and password.     Your access code is: [email or web address]  Expires: 2017 10:16 AM     Your access code will  in 90 days. If you need help or a new code, please call your [facility] or [phone].        Care EveryWhere ID     This is your Care EveryWhere ID. This could be used by other organizations to access your [facility] medical records  [id number]        Your Vitals Were     Pulse Temperature Respirations Height Pulse Oximetry BMI (Body Mass Index)    73 97.9  F (36.6  C) (Tympanic) 16 5' 7\" (1.702 m) 98% 23.59 kg/m2       Blood Pressure from Last 3 Encounters:   17 122/76   17 120/78   17 114/68    Weight from Last 3 Encounters:   17 150 lb 9.6 oz (68.3 kg)   17 151 lb 9.6 oz (68.8 kg)   17 151 lb (68.5 kg)              Today, you had the following     No orders found for display         Today's Medication Changes          These changes are accurate as of: 17  4:19 PM.  If you have any questions, ask your nurse or doctor.               Start taking these medicines.        Dose/Directions    amoxicillin 875 MG tablet   Commonly known as:  AMOXIL   Used for:  Other acute nonsuppurative otitis media of left ear   Started by:  Katie Armas NP        Dose:  875 mg   Take 1 tablet (875 mg) by mouth 2 times daily for 10 days   Quantity:  20 tablet   Refills:  0            Where to get your medicines      These medications were sent to [facility] [id number]  [address]IL[address]IO [address]BARRA  [address] [facility] [address]   [address], [place] [address]     Phone:  [phone]     amoxicillin 875 MG tablet                Primary Care Provider Office Phone # Fax #    " Althea Barragan [phone]       [address] [address]        Equal Access to Services     VANDANA MADDOX : Hadii aad ku hadgeorge Schuster, theoda lurigoberto, qaleticiata kadariusda gayatri, ace euceda lamattdora dowell. So [facility] [phone].    ATENCIÓN: Si habla español, tiene a bass disposición servicios gratuitos de asistencia lingüística. Llame al [phone].    We comply with applicable federal civil rights laws and [place] laws. We do not discriminate on the basis of race, color, national origin, age, disability sex, sexual orientation or gender identity.            Thank you!     Thank you for choosing [facility]  for your care. Our goal is always to provide you with excellent care. Hearing back from our patients is one way we can continue to improve our services. Please take a few minutes to complete the written survey that you may receive in the mail after your visit with us. Thank you!             Your Updated Medication List - Protect others around you: Learn how to safely use, store and throw away your medicines at www.disposemymeds.org.          This list is accurate as of: 6/21/17  4:19 PM.  Always use your most recent med list.                   Brand Name Dispense Instructions for use Diagnosis    amoxicillin 875 MG tablet    AMOXIL    20 tablet    Take 1 tablet (875 mg) by mouth 2 times daily for 10 days    Other acute nonsuppurative otitis media of left ear       citalopram 20 MG tablet    celeXA    45 tablet    Take 1.5 tabs po daily    Anxiety       LORazepam 0.5 MG tablet    ATIVAN    30 tablet    1/2 - 1 po daily PRN panic attack    Anxiety, Panic attack       metoprolol 25 MG 24 hr tablet    TOPROL-XL    45 tablet    Take 1.5 tablets (37.5 mg) by mouth daily Take 1.5 tablets daily    SVT (supraventricular tachycardia) (H)

## 2017-06-21 NOTE — PATIENT INSTRUCTIONS
ASSESSMENT/PLAN:  1. Other acute nonsuppurative otitis media of left ear  symptomatic  - amoxicillin (AMOXIL) 875 MG tablet; Take 1 tablet (875 mg) by mouth 2 times daily for 10 days  Dispense: 20 tablet; Refill: 0  Tylenol or ibuprofen as needed for pain of rever  - consider over the counter antihistamine - claritin, allegra or zyrtec per package directions.     Increase fluids and rest.   Follow up if symptoms do not improve or worsen    Katie Armas,   Certified Adult Nurse Practitioner  [phone]

## 2017-06-21 NOTE — NURSING NOTE
"Chief Complaint   Patient presents with     Ear Problem     left ear painful and verigo wet drainage from ear yesterday       Initial /76 (BP Location: Right arm, Patient Position: Chair, Cuff Size: Adult Regular)  Pulse 73  Temp 97.9  F (36.6  C) (Tympanic)  Resp 16  Ht 5' 7\" (1.702 m)  Wt 150 lb 9.6 oz (68.3 kg)  SpO2 98%  BMI 23.59 kg/m2 Estimated body mass index is 23.59 kg/(m^2) as calculated from the following:    Height as of this encounter: 5' 7\" (1.702 m).    Weight as of this encounter: 150 lb 9.6 oz (68.3 kg).  Medication Reconciliation: complete   Pamela M Lechevalier LPN      "

## 2017-06-21 NOTE — PROGRESS NOTES
"CHIEF COMPLAINT:  Chief Complaint   Patient presents with     Ear Problem     left ear painful and verigo wet drainage from ear yesterday       SUBJECTIVE:   Lamberto Poole  is here today because of:Ear Pain  The patient has had symptoms of cough, earache, nasal congestion/runny nose, facial pressure and fatigue.   Onset of symptoms was 1 week ago. Course of illness is worsening - now with vertigo, started 3 days ago.  Patient admits to exposure to illness at home or work/school.   Patient denies sore throat, nausea, vomiting, diarrhea, chest congestion and wheezing  Treatment measures tried include nothing.  Patient is not a smoker        Past Medical History:   Diagnosis Date     Chronic rhinitis 5/5/2014     History of sinus tachycardia 2/11/2016     No past surgical history on file.  Current Outpatient Prescriptions   Medication Sig Dispense Refill     metoprolol (TOPROL-XL) 25 MG 24 hr tablet Take 1.5 tablets (37.5 mg) by mouth daily Take 1.5 tablets daily 45 tablet 3     citalopram (CELEXA) 20 MG tablet Take 1.5 tabs po daily 45 tablet 3     LORazepam (ATIVAN) 0.5 MG tablet 1/2 - 1 po daily PRN panic attack 30 tablet 0      No Known Allergies    Family and Social History are reviewed.    REVIEW OF SYSTEMS  Skin: negative  Eyes: negative  Ears/Nose/Throat: as above  Respiratory: No shortness of breath, dyspnea on exertion, cough, or hemoptysis  Cardiovascular: negative  Gastrointestinal: negative  Genitourinary: negative  Musculoskeletal: negative  Neurologic: negative  Psychiatric: negative  Hematologic/Lymphatic/Immunologic: seasonal allergies.   Endocrine: negative    OBJECTIVE:   Vital signs:/76 (BP Location: Right arm, Patient Position: Chair, Cuff Size: Adult Regular)  Pulse 73  Temp 97.9  F (36.6  C) (Tympanic)  Resp 16  Ht 5' 7\" (1.702 m)  Wt 150 lb 9.6 oz (68.3 kg)  SpO2 98%  BMI 23.59 kg/m2   General: healthy, alert and no distress  Skin is unremarkable.  HEENT: right TM normal without " fluid or infection, left TM red, dull, bulging, neck without nodes, pharynx erythematous without exudate and post nasal drip noted.  Lungs chest clear to IPPA, no tachypnea, retractions or cyanosis and S1, S2 normal, no murmur, no gallop, rate regular  Psych: mentation normal, affect bright  Rapid Strep Test is not performed    LABS AND IMAGING      ASSESSMENT/PLAN:  1. Other acute nonsuppurative otitis media of left ear  symptomatic  - amoxicillin (AMOXIL) 875 MG tablet; Take 1 tablet (875 mg) by mouth 2 times daily for 10 days  Dispense: 20 tablet; Refill: 0  Tylenol or ibuprofen as needed for pain of rever  - consider over the counter antihistamine - claritin, allegra or zyrtec per package directions.     Increase fluids and rest.   Follow up if symptoms do not improve or worsen    Katie Armas,   Certified Adult Nurse Practitioner  [phone]

## 2017-06-22 ENCOUNTER — TELEPHONE (OUTPATIENT)
Dept: CARDIOLOGY | Facility: OTHER | Age: 22
End: 2017-06-22

## 2017-06-22 NOTE — TELEPHONE ENCOUNTER
Outreach to patient to give results on cardiac event monitoring. No answer 6/22/2017 left message with request for return call to the card department.    Luz Morin RN-BSN

## 2017-06-29 ENCOUNTER — TELEPHONE (OUTPATIENT)
Dept: CARDIOLOGY | Facility: OTHER | Age: 22
End: 2017-06-29

## 2017-06-29 NOTE — TELEPHONE ENCOUNTER
Called patient to get him in to see Brunilda Chen for his follow up cardiology appointment and got a voicemail and left a message.

## 2017-06-29 NOTE — TELEPHONE ENCOUNTER
----- Message from Dimple Bermeo RN sent at 6/29/2017 12:35 PM CDT -----  Patient was a no-show for his last appointment.  Will you please call him to see if he will come in for a follow-up with Brunilda Chen?  Thank you.

## 2017-07-05 ENCOUNTER — OFFICE VISIT (OUTPATIENT)
Dept: FAMILY MEDICINE | Facility: OTHER | Age: 22
End: 2017-07-05
Attending: NURSE PRACTITIONER
Payer: COMMERCIAL

## 2017-07-05 VITALS
WEIGHT: 150 LBS | HEIGHT: 67 IN | DIASTOLIC BLOOD PRESSURE: 62 MMHG | HEART RATE: 93 BPM | SYSTOLIC BLOOD PRESSURE: 96 MMHG | RESPIRATION RATE: 16 BRPM | TEMPERATURE: 97.6 F | OXYGEN SATURATION: 97 % | BODY MASS INDEX: 23.54 KG/M2

## 2017-07-05 DIAGNOSIS — J31.0 CHRONIC RHINITIS, UNSPECIFIED TYPE: ICD-10-CM

## 2017-07-05 DIAGNOSIS — F41.9 ANXIETY: Primary | ICD-10-CM

## 2017-07-05 DIAGNOSIS — I47.10 SVT (SUPRAVENTRICULAR TACHYCARDIA) (H): ICD-10-CM

## 2017-07-05 PROCEDURE — 99214 OFFICE O/P EST MOD 30 MIN: CPT | Performed by: NURSE PRACTITIONER

## 2017-07-05 RX ORDER — METOPROLOL SUCCINATE 25 MG/1
12.5 TABLET, EXTENDED RELEASE ORAL DAILY
Qty: 15 TABLET | Refills: 1 | Status: SHIPPED | OUTPATIENT
Start: 2017-07-05 | End: 2017-09-11

## 2017-07-05 ASSESSMENT — ANXIETY QUESTIONNAIRES
1. FEELING NERVOUS, ANXIOUS, OR ON EDGE: SEVERAL DAYS
4. TROUBLE RELAXING: MORE THAN HALF THE DAYS
GAD7 TOTAL SCORE: 9
7. FEELING AFRAID AS IF SOMETHING AWFUL MIGHT HAPPEN: SEVERAL DAYS
IF YOU CHECKED OFF ANY PROBLEMS ON THIS QUESTIONNAIRE, HOW DIFFICULT HAVE THESE PROBLEMS MADE IT FOR YOU TO DO YOUR WORK, TAKE CARE OF THINGS AT HOME, OR GET ALONG WITH OTHER PEOPLE: VERY DIFFICULT
3. WORRYING TOO MUCH ABOUT DIFFERENT THINGS: SEVERAL DAYS
5. BEING SO RESTLESS THAT IT IS HARD TO SIT STILL: SEVERAL DAYS
6. BECOMING EASILY ANNOYED OR IRRITABLE: MORE THAN HALF THE DAYS
2. NOT BEING ABLE TO STOP OR CONTROL WORRYING: SEVERAL DAYS

## 2017-07-05 NOTE — PATIENT INSTRUCTIONS
ASSESSMENT/PLAN:     1. SVT (supraventricular tachycardia) (H)  - metoprolol (TOPROL-XL) 25 MG 24 hr tablet; Take 0.5 tablets (12.5 mg) by mouth daily  Dispense: 15 tablet; Refill: 1  - decrease to 12.5mg daily for one week then decrease to 6.25 mg for one week then stop.      2. Anxiety  Stop zoloft  - sertraline (ZOLOFT) 50 MG tablet; Take 1 tablet (50 mg) by mouth daily  Dispense: 30 tablet; Refill: 1    3. Chronic rhinitis, unspecified type  Start antihistamine of choice  If no improvement add nasal spray such as nasacort or flonse.       FUTURE APPOINTMENTS:       - Follow-up visit in 2 weeks or as needed    Katie Armas NP  [facility]                 My Depression Action Plan  Name: Lamberto Poole   Date of Birth 1995  Date: 7/5/2017    My doctor: Althea Barragan   My clinic: [facility]  [address] [place] [id number]  [phone]          GREEN    ZONE   Good Control    What it looks like:     Things are going generally well. You have normal up s and down s. You may even feel depressed from time to time, but bad moods usually last less than a day.   What you need to do:  1. Continue to care for yourself (see self care plan)  2. Check your depression survival kit and update it as needed  3. Follow your physician s recommendations including any medication.  4. Do not stop taking medication unless you consult with your physician first.           YELLOW         ZONE Getting Worse    What it looks like:     Depression is starting to interfere with your life.     It may be hard to get out of bed; you may be starting to isolate yourself from others.    Symptoms of depression are starting to last most all day and this has happened for several days.     You may have suicidal thoughts but they are not constant.   What you need to do:     1. Call your care team, your response to treatment will improve if you keep your care team informed of your progress.  Yellow periods are signs an adjustment may need to be made.     2. Continue your self-care, even if you have to fake it!    3. Talk to someone in your support network    4. Open up your depression survival kit           RED    ZONE Medical Alert - Get Help    What it looks like:     Depression is seriously interfering with your life.     You may experience these or other symptoms: You can t get out of bed most days, can t work or engage in other necessary activities, you have trouble taking care of basic hygiene, or basic responsibilities, thoughts of suicide or death that will not go away, self-injurious behavior.     What you need to do:  1. Call your care team and request a same-day appointment. If they are not available (weekends or after hours) call your local crisis line, emergency room or 911.      Electronically signed by: Lisa Okeefe, July 5, 2017    Depression Self Care Plan / Survival Kit    Self-Care for Depression  Here s the deal. Your body and mind are really not as separate as most people think.  What you do and think affects how you feel and how you feel influences what you do and think. This means if you do things that people who feel good do, it will help you feel better.  Sometimes this is all it takes.  There is also a place for medication and therapy depending on how severe your depression is, so be sure to consult with your medical provider and/ or Behavioral Health Consultant if your symptoms are worsening or not improving.     In order to better manage my stress, I will:    Exercise  Get some form of exercise, every day. This will help reduce pain and release endorphins, the  feel good  chemicals in your brain. This is almost as good as taking antidepressants!  This is not the same as joining a gym and then never going! (they count on that by the way ) It can be as simple as just going for a walk or doing some gardening, anything that will get you moving.      Hygiene   Maintain good  hygiene (Get out of bed in the morning, Make your bed, Brush your teeth, Take a shower, and Get dressed like you were going to work, even if you are unemployed).  If your clothes don't fit try to get ones that do.    Diet  I will strive to eat foods that are good for me, drink plenty of water, and avoid excessive sugar, caffeine, alcohol, and other mood-altering substances.  Some foods that are helpful in depression are: complex carbohydrates, B vitamins, flaxseed, fish or fish oil, fresh fruits and vegetables.    Psychotherapy  I agree to participate in Individual Therapy (if recommended).    Medication  If prescribed medications, I agree to take them.  Missing doses can result in serious side effects.  I understand that drinking alcohol, or other illicit drug use, may cause potential side effects.  I will not stop my medication abruptly without first discussing it with my provider.    Staying Connected With Others  I will stay in touch with my friends, family members, and my primary care provider/team.    Use your imagination  Be creative.  We all have a creative side; it doesn t matter if it s oil painting, sand castles, or mud pies! This will also kick up the endorphins.    Witness Beauty  (AKA stop and smell the roses) Take a look outside, even in mid-winter. Notice colors, textures. Watch the squirrels and birds.     Service to others  Be of service to others.  There is always someone else in need.  By helping others we can  get out of ourselves  and remember the really important things.  This also provides opportunities for practicing all the other parts of the program.    Humor  Laugh and be silly!  Adjust your TV habits for less news and crime-drama and more comedy.    Control your stress  Try breathing deep, massage therapy, biofeedback, and meditation. Find time to relax each day.     My support system    Clinic Contact:  Phone number:    Contact 1:  Phone number:    Contact 2:  Phone number:     [facility]/:  Phone number:    Therapist:  Phone number:    [facility] crisis center:    Phone number:    Other community support:  Phone number:

## 2017-07-05 NOTE — MR AVS SNAPSHOT
After Visit Summary   7/5/2017    Lamberto Poole    MRN: [id number]           Patient Information     Date Of Birth          1995        Visit Information        Provider Department      7/5/2017 2:45 PM Katie Armas NP [facility]        Today's Diagnoses     Anxiety    -  1    SVT (supraventricular tachycardia) (H)        Chronic rhinitis, unspecified type          Care Instructions      ASSESSMENT/PLAN:     1. SVT (supraventricular tachycardia) (H)  - metoprolol (TOPROL-XL) 25 MG 24 hr tablet; Take 0.5 tablets (12.5 mg) by mouth daily  Dispense: 15 tablet; Refill: 1  - decrease to 12.5mg daily for one week then decrease to 6.25 mg for one week then stop.      2. Anxiety  Stop zoloft  - sertraline (ZOLOFT) 50 MG tablet; Take 1 tablet (50 mg) by mouth daily  Dispense: 30 tablet; Refill: 1    3. Chronic rhinitis, unspecified type  Start antihistamine of choice  If no improvement add nasal spray such as nasacort or flonse.       FUTURE APPOINTMENTS:       - Follow-up visit in 2 weeks or as needed    Katie Armas NP  [facility]                 My Depression Action Plan  Name: Lamberto Poole   Date of Birth 1995  Date: 7/5/2017    My doctor: Althea Barragan   My clinic: [facility]  [address] [facility] [address]  [phone]          GREEN    ZONE   Good Control    What it looks like:     Things are going generally well. You have normal up s and down s. You may even feel depressed from time to time, but bad moods usually last less than a day.   What you need to do:  1. Continue to care for yourself (see self care plan)  2. Check your depression survival kit and update it as needed  3. Follow your physician s recommendations including any medication.  4. Do not stop taking medication unless you consult with your physician first.           YELLOW         ZONE Getting Worse    What it looks like:     Depression is  starting to interfere with your life.     It may be hard to get out of bed; you may be starting to isolate yourself from others.    Symptoms of depression are starting to last most all day and this has happened for several days.     You may have suicidal thoughts but they are not constant.   What you need to do:     1. Call your care team, your response to treatment will improve if you keep your care team informed of your progress. Yellow periods are signs an adjustment may need to be made.     2. Continue your self-care, even if you have to fake it!    3. Talk to someone in your support network    4. Open up your depression survival kit           RED    ZONE Medical Alert - Get Help    What it looks like:     Depression is seriously interfering with your life.     You may experience these or other symptoms: You can t get out of bed most days, can t work or engage in other necessary activities, you have trouble taking care of basic hygiene, or basic responsibilities, thoughts of suicide or death that will not go away, self-injurious behavior.     What you need to do:  1. Call your care team and request a same-day appointment. If they are not available (weekends or after hours) call your local crisis line, emergency room or 911.      Electronically signed by: Lisa Okeefe, July 5, 2017    Depression Self Care Plan / Survival Kit    Self-Care for Depression  Here s the deal. Your body and mind are really not as separate as most people think.  What you do and think affects how you feel and how you feel influences what you do and think. This means if you do things that people who feel good do, it will help you feel better.  Sometimes this is all it takes.  There is also a place for medication and therapy depending on how severe your depression is, so be sure to consult with your medical provider and/ or Behavioral Health Consultant if your symptoms are worsening or not improving.     In order to better manage my  stress, I will:    Exercise  Get some form of exercise, every day. This will help reduce pain and release endorphins, the  feel good  chemicals in your brain. This is almost as good as taking antidepressants!  This is not the same as joining a gym and then never going! (they count on that by the way ) It can be as simple as just going for a walk or doing some gardening, anything that will get you moving.      Hygiene   Maintain good hygiene (Get out of bed in the morning, Make your bed, Brush your teeth, Take a shower, and Get dressed like you were going to work, even if you are unemployed).  If your clothes don't fit try to get ones that do.    Diet  I will strive to eat foods that are good for me, drink plenty of water, and avoid excessive sugar, caffeine, alcohol, and other mood-altering substances.  Some foods that are helpful in depression are: complex carbohydrates, B vitamins, flaxseed, fish or fish oil, fresh fruits and vegetables.    Psychotherapy  I agree to participate in Individual Therapy (if recommended).    Medication  If prescribed medications, I agree to take them.  Missing doses can result in serious side effects.  I understand that drinking alcohol, or other illicit drug use, may cause potential side effects.  I will not stop my medication abruptly without first discussing it with my provider.    Staying Connected With Others  I will stay in touch with my friends, family members, and my primary care provider/team.    Use your imagination  Be creative.  We all have a creative side; it doesn t matter if it s oil painting, sand castles, or mud pies! This will also kick up the endorphins.    Witness Beauty  (AKA stop and smell the roses) Take a look outside, even in mid-winter. Notice colors, textures. Watch the squirrels and birds.     Service to others  Be of service to others.  There is always someone else in need.  By helping others we can  get out of ourselves  and remember the really important  "things.  This also provides opportunities for practicing all the other parts of the program.    Humor  Laugh and be silly!  Adjust your TV habits for less news and crime-drama and more comedy.    Control your stress  Try breathing deep, massage therapy, biofeedback, and meditation. Find time to relax each day.     My support system    Clinic Contact:  Phone number:    Contact 1:  Phone number:    Contact 2:  Phone number:    [facility]/:  Phone number:    Therapist:  Phone number:    Local [facility] center:    Phone number:    Other community support:  Phone number:              Follow-ups after your visit        Your next 10 appointments already scheduled     Jul 19, 2017  2:30 PM CDT   (Arrive by 2:15 PM)   SHORT with Katie Armas NP   [facility] [place] )    [address]  [facility] [address]   [phone]              Who to contact     If you have questions or need follow up information about today's clinic visit or your schedule please contact [facility] directly at [phone].  Normal or non-critical lab and imaging results will be communicated to you by Near[employer]se[employer], letter or phone within 4 business days after the clinic has received the results. If you do not hear from us within 7 days, please contact the clinic through Near[employer]se[employer] or phone. If you have a critical or abnormal lab result, we will notify you by phone as soon as possible.  Submit refill requests through [employer] or call your pharmacy and they will forward the refill request to us. Please allow 3 business days for your refill to be completed.          Additional Information About Your Visit        Near[employer]se[employer] Information     [employer] lets you send messages to your doctor, view your test results, renew your prescriptions, schedule appointments and more. To sign up, go to [email or web address] . Click on \"Log in\" on the left side of the screen, which will take " "you to the Welcome page. Then click on \"Sign up Now\" on the right side of the page.     You will be asked to enter the access code listed below, as well as some personal information. Please follow the directions to create your username and password.     Your access code is: [email or web address]  Expires: 2017 10:16 AM     Your access code will  in 90 days. If you need help or a new code, please call your [place] clinic or [phone].        Care EveryWhere ID     This is your Care EveryWhere ID. This could be used by other organizations to access your [place] medical records  [id number]        Your Vitals Were     Pulse Temperature Respirations Height Pulse Oximetry BMI (Body Mass Index)    93 97.6  F (36.4  C) (Tympanic) 16 5' 7\" (1.702 m) 97% 23.49 kg/m2       Blood Pressure from Last 3 Encounters:   17 96/62   17 122/76   17 120/78    Weight from Last 3 Encounters:   17 150 lb (68 kg)   17 150 lb 9.6 oz (68.3 kg)   17 151 lb 9.6 oz (68.8 kg)              Today, you had the following     No orders found for display         Today's Medication Changes          These changes are accurate as of: 17  3:35 PM.  If you have any questions, ask your nurse or doctor.               Start taking these medicines.        Dose/Directions    sertraline 50 MG tablet   Commonly known as:  ZOLOFT   Used for:  Anxiety   Started by:  Katie Arams NP        Dose:  50 mg   Take 1 tablet (50 mg) by mouth daily   Quantity:  30 tablet   Refills:  1         These medicines have changed or have updated prescriptions.        Dose/Directions    metoprolol 25 MG 24 hr tablet   Commonly known as:  TOPROL-XL   This may have changed:    - how much to take  - additional instructions   Used for:  SVT (supraventricular tachycardia) (H)   Changed by:  Katie Armas NP        Dose:  12.5 mg   Take 0.5 tablets (12.5 mg) by mouth daily   Quantity:  15 tablet   Refills:  1       "   Stop taking these medicines if you haven't already. Please contact your care team if you have questions.     citalopram 20 MG tablet   Commonly known as:  celeXA   Stopped by:  Katie Armas NP                Where to get your medicines      These medications were sent to [email or web address] [id number] - [address]  AT [facility] [address] [facility] [address]     Phone:  [phone]     metoprolol 25 MG 24 hr tablet    sertraline 50 MG tablet                Primary Care Provider Office Phone # Fax #    Althea LENORE Barragan [phone]       [facility] [address] [address]        Equal Access to Services     VANDANA MADDOX : Hadii sandhya ku hadasho Soomaali, waaxda luqadaha, qaybta kaalmada adeegyada, waxay idiin haybreezyn meggan dowell. So [facility] [phone].    ATENCIÓN: Si habla español, tiene a bass disposición servicios gratuitos de asistencia lingüística. [facility] [phone].    We comply with applicable federal civil rights laws and [place] laws. We do not discriminate on the basis of race, color, national origin, age, disability sex, sexual orientation or gender identity.            Thank you!     Thank you for choosing [facility]  for your care. Our goal is always to provide you with excellent care. Hearing back from our patients is one way we can continue to improve our services. Please take a few minutes to complete the written survey that you may receive in the mail after your visit with us. Thank you!             Your Updated Medication List - Protect others around you: Learn how to safely use, store and throw away your medicines at www.disposemymeds.org.          This list is accurate as of: 7/5/17  3:35 PM.  Always use your most recent med list.                   Brand Name Dispense Instructions for use Diagnosis    LORazepam 0.5 MG tablet    ATIVAN    30 tablet    1/2 - 1 po daily PRN panic  attack    Anxiety, Panic attack       metoprolol 25 MG 24 hr tablet    TOPROL-XL    15 tablet    Take 0.5 tablets (12.5 mg) by mouth daily    SVT (supraventricular tachycardia) (H)       sertraline 50 MG tablet    ZOLOFT    30 tablet    Take 1 tablet (50 mg) by mouth daily    Anxiety

## 2017-07-05 NOTE — NURSING NOTE
"Chief Complaint   Patient presents with     Depression     doing well on current medication      Hypertension     possible reaction causing dizziness       Initial BP 96/62 (BP Location: Left arm, Patient Position: Sitting, Cuff Size: Adult Large)  Pulse 93  Temp 97.6  F (36.4  C) (Tympanic)  Resp 16  Ht 5' 7\" (1.702 m)  Wt 150 lb (68 kg)  SpO2 97%  BMI 23.49 kg/m2 Estimated body mass index is 23.49 kg/(m^2) as calculated from the following:    Height as of this encounter: 5' 7\" (1.702 m).    Weight as of this encounter: 150 lb (68 kg).  Medication Reconciliation: complete     Lisa Okeefe      "

## 2017-07-05 NOTE — PROGRESS NOTES
SUBJECTIVE:                                                    Lamberto Poole is a 22 year old male who presents to clinic today for the following health issues:  Chief Complaint   Patient presents with     Depression     doing well on current medication      Hypertension     possible reaction causing dizziness         Hypertension Follow-up      Outpatient blood pressures are not being checked.    Low Salt Diet: not monitoring salt    He has been feeling lightheaded and dizzy - he decreased his metoprolol back to 25mg for 2 weeks, decreased again 2 days ago to 12.5mg.  He is still experiencing intermittent dizziness.  He denies rapid heart rate as he did with ED visit.  He does have anxiety and will feel pounding heart rate with increased anxiety. He has had previous normal holter monitor.    BP Readings from Last 6 Encounters:   07/05/17 96/62   06/21/17 122/76   06/06/17 120/78   05/08/17 114/68   05/08/17 108/64   05/03/17 122/74       Depression and Anxiety Follow-Up    Status since last visit: depression is better, anxiety is worse.     Other associated symptoms:None    Complicating factors:     Significant life event: No     Current substance abuse: None    PHQ-9 SCORE 5/3/2017 6/6/2017 7/5/2017   Total Score - - -   Total Score 18 12 10     AZRA-7 SCORE 5/8/2017 6/6/2017 7/5/2017   Total Score 16 11 9       PHQ-9  English  PHQ-9   Any Language  GAD7    Amount of exercise or physical activity: active    Problems taking medications regularly: No    Medication side effects: none    Diet: regular (no restrictions)    Allergies - nasal congestion.  Worse this summer than in previous years.  Has taken intermittent claritin with some relief.      Problem list and histories reviewed & adjusted, as indicated.  Additional history: as documented    Patient Active Problem List   Diagnosis     Chronic rhinitis     Anxiety     History of sinus tachycardia     ACP (advance care planning)     History reviewed. No pertinent  "surgical history.    Social History   Substance Use Topics     Smoking status: Never Smoker     Smokeless tobacco: Former User     Alcohol use No     Family History   Problem Relation Age of Onset     Hypertension Father      Heart Murmur Father          Current Outpatient Prescriptions   Medication Sig Dispense Refill     metoprolol (TOPROL-XL) 25 MG 24 hr tablet Take 0.5 tablets (12.5 mg) by mouth daily 15 tablet 1     sertraline (ZOLOFT) 50 MG tablet Take 1 tablet (50 mg) by mouth daily 30 tablet 1     [DISCONTINUED] metoprolol (TOPROL-XL) 25 MG 24 hr tablet Take 1.5 tablets (37.5 mg) by mouth daily Take 1.5 tablets daily (Patient taking differently: Take 25 mg by mouth daily Take 1.5 tablets daily) 45 tablet 3     LORazepam (ATIVAN) 0.5 MG tablet 1/2 - 1 po daily PRN panic attack 30 tablet 0     No Known Allergies  Recent Labs   Lab Test  05/03/17   1031  06/03/14   1540  05/05/14   1619   CR  0.91  1.01*   --    GFRESTIMATED  >90  Non  GFR Calc    >90   --    GFRESTBLACK  >90   GFR Calc    >90   --    POTASSIUM  4.0  3.8   --    TSH  1.68   --   1.06      BP Readings from Last 3 Encounters:   07/05/17 96/62   06/21/17 122/76   06/06/17 120/78    Wt Readings from Last 3 Encounters:   07/05/17 150 lb (68 kg)   06/21/17 150 lb 9.6 oz (68.3 kg)   06/06/17 151 lb 9.6 oz (68.8 kg)                    Reviewed and updated as needed this visit by clinical staff  Tobacco  Allergies  Meds  Problems  Med Hx  Surg Hx  Fam Hx  Soc Hx        Reviewed and updated as needed this visit by Provider         ROS:  Constitutional, HEENT, cardiovascular, pulmonary, gi and gu systems are negative, except as otherwise noted.    OBJECTIVE:     BP 96/62 (BP Location: Left arm, Patient Position: Sitting, Cuff Size: Adult Large)  Pulse 93  Temp 97.6  F (36.4  C) (Tympanic)  Resp 16  Ht 5' 7\" (1.702 m)  Wt 150 lb (68 kg)  SpO2 97%  BMI 23.49 kg/m2  Body mass index is 23.49 kg/(m^2).  GENERAL: " healthy, alert and no distress  HENT: ear canals and TM's normal, nose and mouth without ulcers or lesions  NECK: no adenopathy, no asymmetry, masses, or scars and thyroid normal to palpation  RESP: lungs clear to auscultation - no rales, rhonchi or wheezes  CV: regular rate and rhythm, normal S1 S2, no S3 or S4, no murmur, click or rub, no peripheral edema and peripheral pulses strong  ABDOMEN: soft, nontender, no hepatosplenomegaly, no masses and bowel sounds normal  MS: no gross musculoskeletal defects noted, no edema  PSYCH: mentation appears normal, affect normal/bright      ASSESSMENT/PLAN:     1. SVT (supraventricular tachycardia) (H)  - metoprolol (TOPROL-XL) 25 MG 24 hr tablet; Take 0.5 tablets (12.5 mg) by mouth daily  Dispense: 15 tablet; Refill: 1  - decrease to 12.5mg daily for one week then decrease to 6.25 mg for one week then stop.      2. Anxiety  Stop zoloft  - start sertraline (ZOLOFT) 50 MG tablet; Take 1 tablet (50 mg) by mouth daily  Dispense: 30 tablet; Refill: 1    3. Chronic rhinitis, unspecified type  Start antihistamine of choice  If no improvement add nasal spray such as nasacort or flonse.       FUTURE APPOINTMENTS:       - Follow-up visit in 2 weeks or as needed    Katie Armas, NP  [facility]

## 2017-07-06 ENCOUNTER — TELEPHONE (OUTPATIENT)
Dept: CARDIOLOGY | Facility: OTHER | Age: 22
End: 2017-07-06

## 2017-07-06 ASSESSMENT — ANXIETY QUESTIONNAIRES: GAD7 TOTAL SCORE: 9

## 2017-07-06 ASSESSMENT — PATIENT HEALTH QUESTIONNAIRE - PHQ9: SUM OF ALL RESPONSES TO PHQ QUESTIONS 1-9: 10

## 2017-07-06 NOTE — TELEPHONE ENCOUNTER
Can you please call patient again to try and reschedule a follow-up appointment with Brunilda Chen?  Thank you.

## 2017-07-12 ENCOUNTER — TELEPHONE (OUTPATIENT)
Dept: CARDIOLOGY | Facility: OTHER | Age: 22
End: 2017-07-12

## 2017-07-12 NOTE — TELEPHONE ENCOUNTER
----- Message from Luz Morin RN sent at 7/12/2017 11:16 AM CDT -----  Patient was a no show for follow up with Brunilda Chen, please call patient to reschedule with Bharathi to discuss test results.     Thank you, Luz.

## 2017-07-12 NOTE — TELEPHONE ENCOUNTER
Left message for patient to return our call to reschedule his cardiology appointment with Chrissy Chen NP.

## 2017-07-19 ENCOUNTER — OFFICE VISIT (OUTPATIENT)
Dept: FAMILY MEDICINE | Facility: OTHER | Age: 22
End: 2017-07-19
Attending: NURSE PRACTITIONER
Payer: COMMERCIAL

## 2017-07-19 VITALS
HEART RATE: 79 BPM | OXYGEN SATURATION: 98 % | WEIGHT: 153 LBS | BODY MASS INDEX: 24.01 KG/M2 | TEMPERATURE: 96 F | HEIGHT: 67 IN | DIASTOLIC BLOOD PRESSURE: 70 MMHG | RESPIRATION RATE: 18 BRPM | SYSTOLIC BLOOD PRESSURE: 104 MMHG

## 2017-07-19 DIAGNOSIS — F41.9 ANXIETY: Primary | ICD-10-CM

## 2017-07-19 PROCEDURE — 99213 OFFICE O/P EST LOW 20 MIN: CPT | Performed by: NURSE PRACTITIONER

## 2017-07-19 RX ORDER — SERTRALINE HYDROCHLORIDE 100 MG/1
100 TABLET, FILM COATED ORAL DAILY
Qty: 30 TABLET | Refills: 3 | Status: SHIPPED | OUTPATIENT
Start: 2017-07-19 | End: 2017-11-24

## 2017-07-19 ASSESSMENT — ANXIETY QUESTIONNAIRES
2. NOT BEING ABLE TO STOP OR CONTROL WORRYING: NOT AT ALL
7. FEELING AFRAID AS IF SOMETHING AWFUL MIGHT HAPPEN: SEVERAL DAYS
6. BECOMING EASILY ANNOYED OR IRRITABLE: MORE THAN HALF THE DAYS
1. FEELING NERVOUS, ANXIOUS, OR ON EDGE: SEVERAL DAYS
3. WORRYING TOO MUCH ABOUT DIFFERENT THINGS: SEVERAL DAYS
IF YOU CHECKED OFF ANY PROBLEMS ON THIS QUESTIONNAIRE, HOW DIFFICULT HAVE THESE PROBLEMS MADE IT FOR YOU TO DO YOUR WORK, TAKE CARE OF THINGS AT HOME, OR GET ALONG WITH OTHER PEOPLE: SOMEWHAT DIFFICULT
5. BEING SO RESTLESS THAT IT IS HARD TO SIT STILL: SEVERAL DAYS
GAD7 TOTAL SCORE: 7

## 2017-07-19 ASSESSMENT — PATIENT HEALTH QUESTIONNAIRE - PHQ9: 5. POOR APPETITE OR OVEREATING: SEVERAL DAYS

## 2017-07-19 ASSESSMENT — PAIN SCALES - GENERAL: PAINLEVEL: NO PAIN (0)

## 2017-07-19 NOTE — MR AVS SNAPSHOT
After Visit Summary   7/19/2017    Lamberto Poole    MRN: [id number]           Patient Information     Date Of Birth          1995        Visit Information        Provider Department      7/19/2017 2:30 PM Katie Armas NP [facility]        Today's Diagnoses     Anxiety    -  1      Care Instructions      ASSESSMENT/PLAN:       1. Anxiety  symptomatic  - increase sertraline (ZOLOFT) 100 MG tablet; Take 1 tablet (100 mg) by mouth daily  Dispense: 30 tablet; Refill: 3  - healthy lifestyle reviewed    FUTURE APPOINTMENTS:       - Follow-up visit in 2 weeks or as needed for acute concerns    Katie Armas NP  [facility]    Psychologists/ counselors  [facility]  [place]  [phone]  Dr. Yuniel Knight [phone]  [facility]  [phone]  [facility] [phone]  Agustin Arellano  [phone]   [facility]  [phone]  (kids)  [facility] [phone]  (teens)  [facility]      [phone]  [facility] [phone]    [facility]     [phone]   [facility] counseling [phone]  Martin Mcdonough [phone]  Margarita Kennedy [phone]  [facility]     [phone]  Children[facility] behavioral/ adult family     [phone]  [facility]     [phone]  Terrell Carrasquillo  [phone]  [facility]     [phone]  [facility] Dr. CORONA Aguayo     [phone]                        Follow-ups after your visit        Who to contact     If you have questions or need follow up information about today's clinic visit or your schedule please contact [facility] directly at [phone].  Normal or non-critical lab and imaging results will be communicated to you by MyChart, letter or phone within 4 business days after the clinic has received the results. If you do not hear from us within  "7 days, please contact the clinic through [employer] or phone. If you have a critical or abnormal lab result, we will notify you by phone as soon as possible.  Submit refill requests through [employer] or call your pharmacy and they will forward the refill request to us. Please allow 3 business days for your refill to be completed.          Additional Information About Your Visit        [employer] Information     [employer] lets you send messages to your doctor, view your test results, renew your prescriptions, schedule appointments and more. To sign up, go to [email or web address] . Click on \"Log in\" on the left side of the screen, which will take you to the Welcome page. Then click on \"Sign up Now\" on the right side of the page.     You will be asked to enter the access code listed below, as well as some personal information. Please follow the directions to create your username and password.     Your access code is: [email or web address]  Expires: 2017 10:16 AM     Your access code will  in 90 days. If you need help or a new code, please call your [place] clinic or [phone].        Care EveryWhere ID     This is your Care EveryWhere ID. This could be used by other organizations to access your [place] medical records  [id number]        Your Vitals Were     Pulse Temperature Respirations Height Pulse Oximetry BMI (Body Mass Index)    79 96  F (35.6  C) (Tympanic) 18 5' 7\" (1.702 m) 98% 23.96 kg/m2       Blood Pressure from Last 3 Encounters:   17 104/70   17 96/62   17 122/76    Weight from Last 3 Encounters:   17 153 lb (69.4 kg)   17 150 lb (68 kg)   17 150 lb 9.6 oz (68.3 kg)              Today, you had the following     No orders found for display         Today's Medication Changes          These changes are accurate as of: 17  3:13 PM.  If you have any questions, ask your nurse or doctor.               These medicines have changed or have updated prescriptions.        " Dose/Directions    sertraline 100 MG tablet   Commonly known as:  ZOLOFT   This may have changed:    - medication strength  - how much to take   Used for:  Anxiety   Changed by:  Katie Armas NP        Dose:  100 mg   Take 1 tablet (100 mg) by mouth daily   Quantity:  30 tablet   Refills:  3            Where to get your medicines      These medications were sent to [facility] [address] - [address]  AT [facility] [address] [facility] [address]     Phone:  [phone]     sertraline 100 MG tablet                Primary Care Provider Office Phone # Fax #    Althea BarraganLENORE [phone]       [facility] [place] [address] [place] [address]        Equal Access to Services     VANDANA MADDOX : Hadii sandhya story hadasho Soomaali, waaxda luqadaha, qaybta kaalmada adeegyada, ace dowell. So [facility] [place] [phone].    ATENCIÓN: Si habla español, tiene a bass disposición servicios gratuitos de asistencia lingüística. [facility] [phone].    We comply with applicable federal civil rights laws and [place] laws. We do not discriminate on the basis of race, color, national origin, age, disability sex, sexual orientation or gender identity.            Thank you!     Thank you for choosing [facility]  for your care. Our goal is always to provide you with excellent care. Hearing back from our patients is one way we can continue to improve our services. Please take a few minutes to complete the written survey that you may receive in the mail after your visit with us. Thank you!             Your Updated Medication List - Protect others around you: Learn how to safely use, store and throw away your medicines at www.disposemymeds.org.          This list is accurate as of: 7/19/17  3:13 PM.  Always use your most recent med list.                   Brand Name Dispense Instructions for use Diagnosis    LORazepam 0.5 MG  tablet    ATIVAN    30 tablet    1/2 - 1 po daily PRN panic attack    Anxiety, Panic attack       metoprolol 25 MG 24 hr tablet    TOPROL-XL    15 tablet    Take 0.5 tablets (12.5 mg) by mouth daily    SVT (supraventricular tachycardia) (H)       sertraline 100 MG tablet    ZOLOFT    30 tablet    Take 1 tablet (100 mg) by mouth daily    Anxiety

## 2017-07-19 NOTE — PROGRESS NOTES
SUBJECTIVE:                                                    Lamberto Poole is a 22 year old male who presents to clinic today for the following health issues:  Chief Complaint   Patient presents with     Recheck Medication     no concerns today         Depression and Anxiety Follow-Up    Status since last visit: mildly improvement    Other associated symptoms:None    Complicating factors:     Significant life event: No     Current substance abuse: None    PHQ-9 SCORE 6/6/2017 7/5/2017 7/19/2017   Total Score - - -   Total Score 12 10 8     AZRA-7 SCORE 6/6/2017 7/5/2017 7/19/2017   Total Score 11 9 7       PHQ-9  English  PHQ-9   Any Language  GAD7        Problem list and histories reviewed & adjusted, as indicated.  Additional history: as documented  Patient Active Problem List   Diagnosis     Chronic rhinitis     Anxiety     History of sinus tachycardia     ACP (advance care planning)     No past surgical history on file.    Social History   Substance Use Topics     Smoking status: Never Smoker     Smokeless tobacco: Former User     Alcohol use No     Family History   Problem Relation Age of Onset     Hypertension Father      Heart Murmur Father          Current Outpatient Prescriptions   Medication Sig Dispense Refill     metoprolol (TOPROL-XL) 25 MG 24 hr tablet Take 0.5 tablets (12.5 mg) by mouth daily 15 tablet 1     sertraline (ZOLOFT) 50 MG tablet Take 1 tablet (50 mg) by mouth daily 30 tablet 1     LORazepam (ATIVAN) 0.5 MG tablet 1/2 - 1 po daily PRN panic attack 30 tablet 0     No Known Allergies  Recent Labs   Lab Test  05/03/17   1031  06/03/14   1540  05/05/14   1619   CR  0.91  1.01*   --    GFRESTIMATED  >90  Non  GFR Calc    >90   --    GFRESTBLACK  >90   GFR Calc    >90   --    POTASSIUM  4.0  3.8   --    TSH  1.68   --   1.06      BP Readings from Last 3 Encounters:   07/19/17 104/70   07/05/17 96/62   06/21/17 122/76    Wt Readings from Last 3 Encounters:  "  07/19/17 153 lb (69.4 kg)   07/05/17 150 lb (68 kg)   06/21/17 150 lb 9.6 oz (68.3 kg)                          Reviewed and updated as needed this visit by clinical staffTobacco  Allergies  Meds  Problems       Reviewed and updated as needed this visit by Provider         ROS:  Constitutional, HEENT, cardiovascular, pulmonary, gi and gu systems are negative, except as otherwise noted.      OBJECTIVE:   /70 (BP Location: Left arm, Patient Position: Chair, Cuff Size: Adult Regular)  Pulse 79  Temp 96  F (35.6  C) (Tympanic)  Resp 18  Ht 5' 7\" (1.702 m)  Wt 153 lb (69.4 kg)  SpO2 98%  BMI 23.96 kg/m2  Body mass index is 23.96 kg/(m^2).  GENERAL: healthy, alert and no distress  RESP: lungs clear to auscultation - no rales, rhonchi or wheezes  CV: regular rate and rhythm, normal S1 S2, no S3 or S4, no murmur, click or rub, no peripheral edema and peripheral pulses strong  MS: no gross musculoskeletal defects noted, no edema  PSYCH: mentation appears normal, affect normal/bright      ASSESSMENT/PLAN:       1. Anxiety  symptomatic  - increase sertraline (ZOLOFT) 100 MG tablet; Take 1 tablet (100 mg) by mouth daily  Dispense: 30 tablet; Refill: 3  - healthy lifestyle reviewed    FUTURE APPOINTMENTS:       - Follow-up visit in 2 weeks or as needed for acute concerns    Katie Armas, NP  [facility]    "

## 2017-07-19 NOTE — PATIENT INSTRUCTIONS
ASSESSMENT/PLAN:       1. Anxiety  symptomatic  - increase sertraline (ZOLOFT) 100 MG tablet; Take 1 tablet (100 mg) by mouth daily  Dispense: 30 tablet; Refill: 3  - healthy lifestyle reviewed    FUTURE APPOINTMENTS:       - Follow-up visit in 2 weeks or as needed for acute concerns    Katie Armas, NP  [facility]    Psychologists/ counselors  [facility]  [phone]  Dr. Yuniel Knight [phone]  [facility]  [phone]  [facility] [phone]  Agustin Silverin  [phone]   [facility]  [phone]  (kids)  Ph[facility]ologics [phone]  (teens)  [facility]      [phone]  Located within [facility] [phone]    [facility]     [phone]   [facility] counseling [phone]  Martin Mcdonough [phone]  Margarita Kennedy [phone]  [facility]     [phone]  Childrens behavioral/ adult family     [phone]  [facility] Wellness     [phone]  Terrell Carrasquillo  [phone]  [facility]     [phone]  [facility] Dr. CORONA Aguayo     [phone]

## 2017-07-19 NOTE — NURSING NOTE
"Chief Complaint   Patient presents with     Recheck Medication     no concerns today       Initial /70 (BP Location: Left arm, Patient Position: Chair, Cuff Size: Adult Regular)  Pulse 79  Temp 96  F (35.6  C) (Tympanic)  Resp 18  Ht 5' 7\" (1.702 m)  Wt 153 lb (69.4 kg)  SpO2 98%  BMI 23.96 kg/m2 Estimated body mass index is 23.96 kg/(m^2) as calculated from the following:    Height as of this encounter: 5' 7\" (1.702 m).    Weight as of this encounter: 153 lb (69.4 kg).  Medication Reconciliation: complete   Pamela M Lechevalier LPN      "

## 2017-07-20 ASSESSMENT — PATIENT HEALTH QUESTIONNAIRE - PHQ9: SUM OF ALL RESPONSES TO PHQ QUESTIONS 1-9: 8

## 2017-07-20 ASSESSMENT — ANXIETY QUESTIONNAIRES: GAD7 TOTAL SCORE: 7

## 2017-09-11 ENCOUNTER — OFFICE VISIT (OUTPATIENT)
Dept: FAMILY MEDICINE | Facility: OTHER | Age: 22
End: 2017-09-11
Attending: NURSE PRACTITIONER
Payer: COMMERCIAL

## 2017-09-11 VITALS
HEART RATE: 96 BPM | TEMPERATURE: 98.2 F | DIASTOLIC BLOOD PRESSURE: 74 MMHG | WEIGHT: 150 LBS | SYSTOLIC BLOOD PRESSURE: 110 MMHG | BODY MASS INDEX: 23.54 KG/M2 | HEIGHT: 67 IN | RESPIRATION RATE: 14 BRPM

## 2017-09-11 DIAGNOSIS — H10.33 ACUTE BACTERIAL CONJUNCTIVITIS OF BOTH EYES: Primary | ICD-10-CM

## 2017-09-11 PROCEDURE — 99213 OFFICE O/P EST LOW 20 MIN: CPT | Performed by: NURSE PRACTITIONER

## 2017-09-11 RX ORDER — GENTAMICIN SULFATE 3 MG/ML
1 SOLUTION/ DROPS OPHTHALMIC EVERY 4 HOURS
Qty: 5 ML | Refills: 0 | Status: SHIPPED | OUTPATIENT
Start: 2017-09-11 | End: 2017-09-18

## 2017-09-11 ASSESSMENT — PATIENT HEALTH QUESTIONNAIRE - PHQ9
SUM OF ALL RESPONSES TO PHQ QUESTIONS 1-9: 2
5. POOR APPETITE OR OVEREATING: NOT AT ALL

## 2017-09-11 ASSESSMENT — ANXIETY QUESTIONNAIRES
7. FEELING AFRAID AS IF SOMETHING AWFUL MIGHT HAPPEN: SEVERAL DAYS
GAD7 TOTAL SCORE: 6
IF YOU CHECKED OFF ANY PROBLEMS ON THIS QUESTIONNAIRE, HOW DIFFICULT HAVE THESE PROBLEMS MADE IT FOR YOU TO DO YOUR WORK, TAKE CARE OF THINGS AT HOME, OR GET ALONG WITH OTHER PEOPLE: SOMEWHAT DIFFICULT
6. BECOMING EASILY ANNOYED OR IRRITABLE: SEVERAL DAYS
2. NOT BEING ABLE TO STOP OR CONTROL WORRYING: SEVERAL DAYS
5. BEING SO RESTLESS THAT IT IS HARD TO SIT STILL: SEVERAL DAYS
1. FEELING NERVOUS, ANXIOUS, OR ON EDGE: SEVERAL DAYS
3. WORRYING TOO MUCH ABOUT DIFFERENT THINGS: SEVERAL DAYS

## 2017-09-11 NOTE — PROGRESS NOTES
SUBJECTIVE:   Lamberto Poole is a 22 year old male who presents to clinic today for the following health issues:      Eye(s) Problem    Onset: Yesterday    Description:   Location: bilateral  Pain: YES- tender  Redness: YES    Accompanying Signs & Symptoms:  Discharge/mattering: YES  Swelling: no   Visual changes: no   Fever: no   Nasal Congestion: no   Bothered by bright lights: no     History:   Trauma: no   Foreign body exposure: no     Precipitating factors:   Wearing contacts: no     Alleviating factors:  Improved by: nothing    Therapies Tried and outcome: nothing    Was in UC on the weekend, given Bicillin for pharyngitis.   This is improved      Depression Followup    Status since last visit: Stable     See PHQ-9 for current symptoms.  Other associated symptoms: None    Complicating factors:   Significant life event:  No   Current substance abuse:  None  Anxiety or Panic symptoms:  No    PHQ-9  English  PHQ-9   Any Language    PHQ-9 SCORE 7/5/2017 7/19/2017 9/11/2017   Total Score - - -   Total Score 10 8 2     AZRA-7 SCORE 7/5/2017 7/19/2017 9/11/2017   Total Score 9 7 6             Problem list and histories reviewed & adjusted, as indicated.  Additinal history: as documented    Patient Active Problem List   Diagnosis     Chronic rhinitis     Anxiety     History of sinus tachycardia     ACP (advance care planning)     No past surgical history on file.    Social History   Substance Use Topics     Smoking status: Never Smoker     Smokeless tobacco: Former User     Alcohol use No     Family History   Problem Relation Age of Onset     Hypertension Father      Heart Murmur Father          Current Outpatient Prescriptions   Medication Sig Dispense Refill     sertraline (ZOLOFT) 100 MG tablet Take 1 tablet (100 mg) by mouth daily 30 tablet 3     No Known Allergies  Recent Labs   Lab Test  05/03/17   1031  06/03/14   1540  05/05/14   1619   CR  0.91  1.01*   --    GFRESTIMATED  >90  Non  GFR  "Calc    >90   --    GFRESTBLACK  >90   GFR Calc    >90   --    POTASSIUM  4.0  3.8   --    TSH  1.68   --   1.06      BP Readings from Last 3 Encounters:   09/11/17 110/74   07/19/17 104/70   07/05/17 96/62    Wt Readings from Last 3 Encounters:   09/11/17 150 lb (68 kg)   07/19/17 153 lb (69.4 kg)   07/05/17 150 lb (68 kg)                  Labs reviewed in EPIC          Reviewed and updated as needed this visit by clinical staffTobacco  Allergies  Meds       Reviewed and updated as needed this visit by Provider         ROS:  Constitutional, HEENT, cardiovascular, pulmonary, gi and gu systems are negative, except as otherwise noted.      OBJECTIVE:   /74 (BP Location: Right arm, Patient Position: Sitting, Cuff Size: Adult Large)  Pulse 96  Temp 98.2  F (36.8  C) (Tympanic)  Resp 14  Ht 5' 7\" (1.702 m)  Wt 150 lb (68 kg)  BMI 23.49 kg/m2  Body mass index is 23.49 kg/(m^2).     GENERAL: healthy, alert and no distress  EYES: conjunctiva/corneas- conjunctival injection both eyes, yellow drainage at inner canthus  HENT: nasal mucosa edematous  and erythema of posterior oropharynx  NECK: no adenopathy, no asymmetry, masses, or scars and thyroid normal to palpation  RESP: lungs clear to auscultation - no rales, rhonchi or wheezes  CV: regular rate and rhythm, normal S1 S2, no S3 or S4, no murmur, click or rub, no peripheral edema and peripheral pulses strong  ABDOMEN: soft, nontender, no hepatosplenomegaly, no masses and bowel sounds normal  MS: no gross musculoskeletal defects noted, no edema        ASSESSMENT/PLAN:       1. Acute bacterial conjunctivitis of both eyes  - gentamicin (GARAMYCIN) 0.3 % ophthalmic solution; Apply 1 drop to eye every 4 hours for 7 days  Dispense: 5 mL; Refill: 0      Althea Barragan NP  [facility]    "

## 2017-09-11 NOTE — PATIENT INSTRUCTIONS
ASSESSMENT/PLAN:       1. Acute bacterial conjunctivitis of both eyes  - gentamicin (GARAMYCIN) 0.3 % ophthalmic solution; Apply 1 drop to eye every 4 hours for 7 days  Dispense: 5 mL; Refill: 0      Althea Barragan NP  [facility]                        Conjunctivitis  What is eye inflammation?   The clear membrane that lines the inside of the eyelids and covers the white of the eye (conjunctiva) can get red and swollen. This is called conjunctivitis.   How does it occur?   Conjunctivitis can be caused by many things, including infection by viruses or bacteria. Many kinds of bacteria can cause conjunctivitis. These include bacteria that cause strep, staph, and STD infections.   Conjunctivitis caused by a virus is sometimes called pink eye. It can be spread easily to other people. The same viruses that cause the common cold can cause viral conjunctivitis. Viruses can be spread by coughing or sneezing and can get in your eyes through contact with infected:  hands   washcloths or towels   cosmetics   false eyelashes   soft contact lenses  Avoid unnecessary contact with others so that you do not spread the disease.   What are the symptoms?   Symptoms may include:  itchy or scratchy eyes   redness   painful sensitivity to light   swelling of eyelids   matting of eyelashes   watery or pus discharge  How is it diagnosed?   Your healthcare provider will ask about your medical history and if you have been near someone who has conjunctivitis. Your provider will examine your eyes. He or she will also check for enlarged lymph nodes near your ear and jaw. Your provider may get lab tests of a sample of the pus to see what type of germs are present.  How is it treated?   Like a cold, viral conjunctivitis will usually go away on its own without treatment. However, your healthcare provider may prescribe eyedrops to help control your symptoms. Antihistamine pills may also relieve the itching and redness.  If you have  bacterial conjunctivitis, your healthcare provider will prescribe antibiotic eyedrops. You can also help your eyes get better by washing them gently to remove any pus or crusts. Then dry them gently with a clean towel.  For very severe forms of conjunctivitis, antibiotics may need to be given by mouth or with a shot or an IV.  If you wear contact lenses, you will need to stop wearing them until your eyes are healed. The combination of contacts and conjunctivitis may damage your cornea (the clear outer layer on the front of your eye) and cause severe vision problems. Your provider may ask you to throw away your current contact lenses and lens case.  How long will the effects last?   Viral conjunctivitis usually gets worse 5 to 7 days after the first symptoms. It can get better in 10 days to 1 month. If only one eye is affected at first, the other eye may become infected up to 2 weeks later. Usually, if both eyes are affected, the first eye has worse conjunctivitis than the second.  Bacterial conjunctivitis should improve within 2 days after you begin using antibiotics. If your eyes are not better after 3 days of antibiotics, call your healthcare provider.  How can I prevent conjunctivitis?   To keep from getting conjunctivitis from someone who has it, or to keep from spreading it to others, follow these guidelines:  Wash your hands often. Do not touch or rub your eyes.   Never share eye makeup or cosmetics with anyone. When you have conjunctivitis, throw out eye makeup you have been using.   Never use eye medicine that has been prescribed for someone else.   Do not share towels, washcloths, pillows, or sheets with anyone. If one of your eyes is affected but not the other, use a separate towel for each eye.   Avoid swimming in swimming pools if you have conjunctivitis.   Avoid close contact with people until your symptoms improve. Depending on your job, you may be asked to take some time off from work.  When should I  call my healthcare provider?   Call your provider if:  You have any severe eye pain.   Your symptoms do not improve after you have used your medicine for 3 days (if you have bacterial conjunctivitis).   Your symptoms do not improve after 2 weeks (if you have viral conjunctivitis).   Your eyes get very sensitive to light, even after the redness is gone.  Reviewed for medical accuracy by faculty at the [facility]. Web site: [email or web address]

## 2017-09-11 NOTE — LETTER
[facility]  [address]  [place] [address]  Phone: [phone]    September 11, 2017        Lamberto Poole  [address] [place] [address]          To whom it may concern:    RE: Lamberto Poole    Patient was seen and treated today at our clinic.  Please excuse from work 9/11/17 and 9/12/17 due to illness.    Please contact me for questions or concerns.      Sincerely,        Althea Barragan NP

## 2017-09-11 NOTE — NURSING NOTE
"Chief Complaint   Patient presents with     Eye Problem       Initial /74 (BP Location: Right arm, Patient Position: Sitting, Cuff Size: Adult Large)  Pulse 96  Temp 98.2  F (36.8  C) (Tympanic)  Resp 14  Ht 5' 7\" (1.702 m)  Wt 150 lb (68 kg)  BMI 23.49 kg/m2 Estimated body mass index is 23.49 kg/(m^2) as calculated from the following:    Height as of this encounter: 5' 7\" (1.702 m).    Weight as of this encounter: 150 lb (68 kg).  Medication Reconciliation: complete   Laura Marie      "

## 2017-09-11 NOTE — MR AVS SNAPSHOT
After Visit Summary   9/11/2017    Lamberto Poole    MRN: [id number]           Patient Information     Date Of Birth          1995        Visit Information        Provider Department      9/11/2017 10:45 AM Althea Barragan NP [facility]        Today's Diagnoses     Acute bacterial conjunctivitis of both eyes    -  1      Care Instructions      ASSESSMENT/PLAN:       1. Acute bacterial conjunctivitis of both eyes  - gentamicin (GARAMYCIN) 0.3 % ophthalmic solution; Apply 1 drop to eye every 4 hours for 7 days  Dispense: 5 mL; Refill: 0      Althea Barragan NP  [facility]                        Conjunctivitis  What is eye inflammation?   The clear membrane that lines the inside of the eyelids and covers the white of the eye (conjunctiva) can get red and swollen. This is called conjunctivitis.   How does it occur?   Conjunctivitis can be caused by many things, including infection by viruses or bacteria. Many kinds of bacteria can cause conjunctivitis. These include bacteria that cause strep, staph, and STD infections.   Conjunctivitis caused by a virus is sometimes called pink eye. It can be spread easily to other people. The same viruses that cause the common cold can cause viral conjunctivitis. Viruses can be spread by coughing or sneezing and can get in your eyes through contact with infected:  hands   washcloths or towels   cosmetics   false eyelashes   soft contact lenses  Avoid unnecessary contact with others so that you do not spread the disease.   What are the symptoms?   Symptoms may include:  itchy or scratchy eyes   redness   painful sensitivity to light   swelling of eyelids   matting of eyelashes   watery or pus discharge  How is it diagnosed?   Your healthcare provider will ask about your medical history and if you have been near someone who has conjunctivitis. Your provider will examine your eyes. He or she will also check for enlarged lymph nodes near your ear and  jaw. Your provider may get lab tests of a sample of the pus to see what type of germs are present.  How is it treated?   Like a cold, viral conjunctivitis will usually go away on its own without treatment. However, your healthcare provider may prescribe eyedrops to help control your symptoms. Antihistamine pills may also relieve the itching and redness.  If you have bacterial conjunctivitis, your healthcare provider will prescribe antibiotic eyedrops. You can also help your eyes get better by washing them gently to remove any pus or crusts. Then dry them gently with a clean towel.  For very severe forms of conjunctivitis, antibiotics may need to be given by mouth or with a shot or an IV.  If you wear contact lenses, you will need to stop wearing them until your eyes are healed. The combination of contacts and conjunctivitis may damage your cornea (the clear outer layer on the front of your eye) and cause severe vision problems. Your provider may ask you to throw away your current contact lenses and lens case.  How long will the effects last?   Viral conjunctivitis usually gets worse 5 to 7 days after the first symptoms. It can get better in 10 days to 1 month. If only one eye is affected at first, the other eye may become infected up to 2 weeks later. Usually, if both eyes are affected, the first eye has worse conjunctivitis than the second.  Bacterial conjunctivitis should improve within 2 days after you begin using antibiotics. If your eyes are not better after 3 days of antibiotics, call your healthcare provider.  How can I prevent conjunctivitis?   To keep from getting conjunctivitis from someone who has it, or to keep from spreading it to others, follow these guidelines:  Wash your hands often. Do not touch or rub your eyes.   Never share eye makeup or cosmetics with anyone. When you have conjunctivitis, throw out eye makeup you have been using.   Never use eye medicine that has been prescribed for someone  "else.   Do not share towels, washcloths, pillows, or sheets with anyone. If one of your eyes is affected but not the other, use a separate towel for each eye.   Avoid swimming in swimming pools if you have conjunctivitis.   Avoid close contact with people until your symptoms improve. Depending on your job, you may be asked to take some time off from work.  When should I call my healthcare provider?   Call your provider if:  You have any severe eye pain.   Your symptoms do not improve after you have used your medicine for 3 days (if you have bacterial conjunctivitis).   Your symptoms do not improve after 2 weeks (if you have viral conjunctivitis).   Your eyes get very sensitive to light, even after the redness is gone.  Reviewed for medical accuracy by faculty at the [facility]. Web site: [email or web address]            Follow-ups after your visit        Who to contact     If you have questions or need follow up information about today's clinic visit or your schedule please contact [facility] directly at [phone].  Normal or non-critical lab and imaging results will be communicated to you by MyChart, letter or phone within 4 business days after the clinic has received the results. If you do not hear from us within 7 days, please contact the clinic through [employer] or phone. If you have a critical or abnormal lab result, we will notify you by phone as soon as possible.  Submit refill requests through [employer] or call your pharmacy and they will forward the refill request to us. Please allow 3 business days for your refill to be completed.          Additional Information About Your Visit        [employer] Information     [employer] lets you send messages to your doctor, view your test results, renew your prescriptions, schedule appointments and more. To sign up, go to [email or web address] . Click on \"Log in\" on the left side of the screen, which will take you " "to the Welcome page. Then click on \"Sign up Now\" on the right side of the page.     You will be asked to enter the access code listed below, as well as some personal information. Please follow the directions to create your username and password.     Your access code is: [email or web address]  Expires: 12/10/2017 11:08 AM     Your access code will  in 90 days. If you need help or a new code, please call your [facility] or [phone].        Care EveryWhere ID     This is your Care EveryWhere ID. This could be used by other organizations to access your [place] medical records  [id number]        Your Vitals Were     Pulse Temperature Respirations Height BMI (Body Mass Index)       96 98.2  F (36.8  C) (Tympanic) 14 5' 7\" (1.702 m) 23.49 kg/m2        Blood Pressure from Last 3 Encounters:   17 110/74   17 104/70   17 96/62    Weight from Last 3 Encounters:   17 150 lb (68 kg)   17 153 lb (69.4 kg)   17 150 lb (68 kg)              Today, you had the following     No orders found for display         Today's Medication Changes          These changes are accurate as of: 17 11:09 AM.  If you have any questions, ask your nurse or doctor.               Start taking these medicines.        Dose/Directions    gentamicin 0.3 % ophthalmic solution   Commonly known as:  GARAMYCIN   Used for:  Acute bacterial conjunctivitis of both eyes   Started by:  Althea Barragan NP        Dose:  1 drop   Apply 1 drop to eye every 4 hours for 7 days   Quantity:  5 mL   Refills:  0            Where to get your medicines      These medications were sent to [facility] - [place] - [address] AT [facility] [address]   [address], [place] [address]     Phone:  [phone]     gentamicin 0.3 % ophthalmic solution                Primary Care Provider Office Phone # Fax #    Althea Barragan [phone]       [address]" [place] [address]        Equal Access to Services     [facility]: Hadii sandhya story reecekamlesh Landen, wadarrylda luqadaha, qaybta kadariusantolin mendieta, ace dowell. So [facility] [phone].    ATENCIÓN: Si habla español, tiene a bass disposición servicios gratuitos de asistencia lingüística. Llame al [phone].    We comply with applicable federal civil rights laws and [place] laws. We do not discriminate on the basis of race, color, national origin, age, disability sex, sexual orientation or gender identity.            Thank you!     Thank you for choosing [facility]  for your care. Our goal is always to provide you with excellent care. Hearing back from our patients is one way we can continue to improve our services. Please take a few minutes to complete the written survey that you may receive in the mail after your visit with us. Thank you!             Your Updated Medication List - Protect others around you: Learn how to safely use, store and throw away your medicines at www.disposemymeds.org.          This list is accurate as of: 9/11/17 11:09 AM.  Always use your most recent med list.                   Brand Name Dispense Instructions for use Diagnosis    gentamicin 0.3 % ophthalmic solution    GARAMYCIN    5 mL    Apply 1 drop to eye every 4 hours for 7 days    Acute bacterial conjunctivitis of both eyes       sertraline 100 MG tablet    ZOLOFT    30 tablet    Take 1 tablet (100 mg) by mouth daily    Anxiety

## 2017-09-12 ASSESSMENT — ANXIETY QUESTIONNAIRES: GAD7 TOTAL SCORE: 6

## 2017-10-31 ENCOUNTER — TRANSFERRED RECORDS (OUTPATIENT)
Dept: HEALTH INFORMATION MANAGEMENT | Facility: HOSPITAL | Age: 22
End: 2017-10-31

## 2017-11-24 DIAGNOSIS — F41.9 ANXIETY: ICD-10-CM

## 2017-11-28 RX ORDER — SERTRALINE HYDROCHLORIDE 100 MG/1
TABLET, FILM COATED ORAL
Qty: 30 TABLET | Refills: 8 | Status: SHIPPED | OUTPATIENT
Start: 2017-11-28 | End: 2018-08-22

## 2018-01-02 ENCOUNTER — TELEPHONE (OUTPATIENT)
Dept: CARDIOLOGY | Facility: OTHER | Age: 23
End: 2018-01-02

## 2018-01-02 NOTE — TELEPHONE ENCOUNTER
Patient was a no show for the follow up of his cardiac event monitor.  Has not called to reschedule appointment.   Thank you, Luz Morin RN-BSN

## 2018-08-15 ENCOUNTER — RADIANT APPOINTMENT (OUTPATIENT)
Dept: GENERAL RADIOLOGY | Facility: OTHER | Age: 23
End: 2018-08-15
Attending: NURSE PRACTITIONER
Payer: COMMERCIAL

## 2018-08-15 ENCOUNTER — OFFICE VISIT (OUTPATIENT)
Dept: FAMILY MEDICINE | Facility: OTHER | Age: 23
End: 2018-08-15
Attending: NURSE PRACTITIONER
Payer: COMMERCIAL

## 2018-08-15 VITALS
HEART RATE: 88 BPM | RESPIRATION RATE: 14 BRPM | TEMPERATURE: 97.6 F | SYSTOLIC BLOOD PRESSURE: 116 MMHG | BODY MASS INDEX: 26 KG/M2 | DIASTOLIC BLOOD PRESSURE: 60 MMHG | WEIGHT: 166 LBS | OXYGEN SATURATION: 97 %

## 2018-08-15 DIAGNOSIS — M72.2 PLANTAR FASCIITIS: ICD-10-CM

## 2018-08-15 DIAGNOSIS — K12.0 RECURRENT CANKER SORES: ICD-10-CM

## 2018-08-15 DIAGNOSIS — S99.929A FOOT INJURY: ICD-10-CM

## 2018-08-15 DIAGNOSIS — S99.921A INJURY OF RIGHT FOOT, INITIAL ENCOUNTER: Primary | ICD-10-CM

## 2018-08-15 PROCEDURE — 73630 X-RAY EXAM OF FOOT: CPT | Mod: TC

## 2018-08-15 PROCEDURE — 99214 OFFICE O/P EST MOD 30 MIN: CPT | Performed by: NURSE PRACTITIONER

## 2018-08-15 RX ORDER — IBUPROFEN 800 MG/1
800 TABLET, FILM COATED ORAL EVERY 8 HOURS PRN
Qty: 90 TABLET | Refills: 1 | Status: SHIPPED | OUTPATIENT
Start: 2018-08-15 | End: 2021-12-03

## 2018-08-15 ASSESSMENT — PAIN SCALES - GENERAL: PAINLEVEL: SEVERE PAIN (6)

## 2018-08-15 NOTE — PROGRESS NOTES
SUBJECTIVE:   Lamberto Poole is a 23 year old male who presents to clinic today for the following health issues:      Joint Pain    Onset: 1 month    Description:   Location: right foot  Character: Sharp    Intensity: moderate    Progression of Symptoms: worse    Accompanying Signs & Symptoms:  Other symptoms: none    History:   Previous similar pain: no       Precipitating factors:   Trauma or overuse: YES - denies injury, walks on concrete daily.  Pain is worse after sitting for periods of time.     Alleviating factors:  Improved by: nothing    Therapies Tried and outcome: nothing      Frequent canker sores - requesting lidocaine.      Problem list and histories reviewed & adjusted, as indicated.  Additional history: as documented    Patient Active Problem List   Diagnosis     Chronic rhinitis     Anxiety     History of sinus tachycardia     ACP (advance care planning)     No past surgical history on file.    Social History   Substance Use Topics     Smoking status: Never Smoker     Smokeless tobacco: Former User     Alcohol use No     Family History   Problem Relation Age of Onset     Hypertension Father      Heart Murmur Father          Current Outpatient Prescriptions   Medication Sig Dispense Refill     sertraline (ZOLOFT) 100 MG tablet TAKE 1 TABLET BY MOUTH DAILY 30 tablet 8     No Known Allergies  Recent Labs   Lab Test  05/03/17   1031  06/03/14   1540  05/05/14   1619   CR  0.91  1.01*   --    GFRESTIMATED  >90  Non  GFR Calc    >90   --    GFRESTBLACK  >90   GFR Calc    >90   --    POTASSIUM  4.0  3.8   --    TSH  1.68   --   1.06      BP Readings from Last 3 Encounters:   08/15/18 116/60   09/11/17 110/74   07/19/17 104/70    Wt Readings from Last 3 Encounters:   08/15/18 166 lb (75.3 kg)   09/11/17 150 lb (68 kg)   07/19/17 153 lb (69.4 kg)                    Reviewed and updated as needed this visit by clinical staff  Tobacco  Allergies  Meds       Reviewed and  updated as needed this visit by Provider         ROS:  Constitutional, HEENT, cardiovascular, pulmonary, gi and gu systems are negative, except as otherwise noted.    OBJECTIVE:     /60 (BP Location: Right arm, Patient Position: Sitting, Cuff Size: Adult Regular)  Pulse 88  Temp 97.6  F (36.4  C) (Tympanic)  Resp 14  Wt 166 lb (75.3 kg)  SpO2 97%  BMI 26 kg/m2  Body mass index is 26 kg/(m^2).  GENERAL: healthy, alert and no distress  MS: right foot, no erythema, no edema or ecchymosis.  Tenderness of lateral aspect to mid foot.  Pain is worse with flexion and extension.   PSYCH: mentation appears normal, affect normal/bright    PROCEDURE:  XR FOOT RT G/E 3 VW     HISTORY: ; Foot injury     COMPARISON:  None.     TECHNIQUE:  3 views of the right foot were obtained.     FINDINGS:  No fracture or dislocation is identified. The joint spaces  are preserved.           IMPRESSION: No acute fracture.       ANNELIESE DEY MD    ASSESSMENT/PLAN:       1. Foot injury  No acute fracture noted  - XR FOOT RT G/E 3 VW (Clinic Performed); Future    2. Plantar fasciitis  Symptomatic cares  - ice as reviewed  - stretch as reviewed  - ibuprofen (ADVIL/MOTRIN) 800 MG tablet; Take 1 tablet (800 mg) by mouth every 8 hours as needed for moderate pain  Dispense: 90 tablet; Refill: 1    3. Recurrent canker sores  - lidocaine, viscous, (XYLOCAINE) 2 % solution; swish and spit every 3-8 hours as needed; max 8 doses/24 hour period  Dispense: 100 mL; Refill: 5    FUTURE APPOINTMENTS:       - Follow-up visit if no improvement or any worsening; may consider referral to podiatry for evaluation.     Katie Armas, NP  [facility]

## 2018-08-15 NOTE — MR AVS SNAPSHOT
After Visit Summary   8/15/2018    Lamberto Poole    MRN: [id number]           Patient Information     Date Of Birth          1995        Visit Information        Provider Department      8/15/2018 4:15 PM Katie Armas NP [facility]        Today's Diagnoses     Foot injury    -  1    Plantar fasciitis        Recurrent canker sores          Care Instructions      ASSESSMENT/PLAN:       1. Foot injury  No acute fracture noted  - XR FOOT RT G/E 3 VW (Clinic Performed); Future    2. Plantar fasciitis  Symptomatic cares  - ice as reviewed  - stretch as reviewed  - ibuprofen (ADVIL/MOTRIN) 800 MG tablet; Take 1 tablet (800 mg) by mouth every 8 hours as needed for moderate pain  Dispense: 90 tablet; Refill: 1    3. Recurrent canker sores  - lidocaine, viscous, (XYLOCAINE) 2 % solution; swish and spit every 3-8 hours as needed; max 8 doses/24 hour period  Dispense: 100 mL; Refill: 5    FUTURE APPOINTMENTS:       - Follow-up visit if no improvement or any worsening; may consider referral to podiatry for evaluation.     Katie Armas NP  [facility]    Plantar Fasciitis  Plantar fasciitis is a painful swelling of the plantar fascia. The plantar fascia is a thick, fibrous layer of tissue. It covers the bones on the bottom of your foot. And it supports the foot bones in an arched position.  This can happen gradually or suddenly. It usually affects one foot at a time. Heel pain can be sharp, like a knife sticking into the bottom of your foot. You may feel pain after exercising, long-distance jogging, stair climbing, long periods of standing, or after standing up.  Risk factors include: non-active lifestyle, arthritis, diabetes, obesity or recent weight gain, flat foot, high arch. Wearing high heels, loose shoes, or shoes with poor arch support for long periods of time adds to the risk. This problem is commonly found in runners and dancers. It also found in  people who stand on hard surfaces for long periods of time.  Foot pain from this condition is usually worse in the morning. But it improves with walking. By the end of the day there may be a dull aching. Treatment requires short-term rest and controlling swelling. It may take up to 9 months before all symptoms go away. Rarely, a steroid injection into the foot, or surgery, may be needed.  Home care    If you are overweight, lose weight to help healing.    Choose supportive shoes with good arch support and shock absorbency. Replace athletic shoes when they become worn out. Don t walk or run barefoot.    Premade or custom-fitted shoe inserts may be helpful. Inserts made of silicone seem to be the most effective. Custom-made inserts can be provided by a podiatrist or foot specialist, physical therapist, or orthopedist.    Premade or custom-made night splints keep the heel stretched out while you sleep. They may prevent morning pain.    Avoid activities that stress the feet: jogging, prolonged standing or walking, contact sports, etc.    First thing in the morning and before sports, stretch the bottom of your feet. Gently flex your ankle so the toes move toward your knee.    Icing may help control heel pain. Apply an ice pack to the heel for 10-20 minutes as a preventive. Or ice your heel after a severe flare-up of symptoms. You may repeat this every 1-2 hours as needed.    You may use over-the-counter pain medicine to control pain, unless another medicine was prescribed. Anti-inflammatory pain medicines, such as ibuprofen or naproxen, may work better than acetaminophen. If you have chronic liver or kidney disease or ever had a stomach ulcer or GI bleeding, talk with your healthcare provider before using these medicines.  Follow-up care  Follow up with your healthcare provider, physical therapist, or podiatrist or foot specialist as advised.  Call for an appointment if pain worsens or there is no relief after a few  "weeks of home treatment. Shoe inserts, a night splint, or a special boot may be required.  If X-rays were taken, you will be told of any new findings that may affect your care.  When to seek medical advice  Call your healthcare provider right away if any of these occur:    Foot swelling    Redness with increasing pain  Date Last Reviewed: 11/21/2015 2000-2017 [employer]. [address] [place] [address]. All rights reserved. This information is not intended as a substitute for professional medical care. Always follow your healthcare professional's instructions.                Follow-ups after your visit        Who to contact     If you have questions or need follow up information about today's clinic visit or your schedule please contact [facility] directly at [phone].  Normal or non-critical lab and imaging results will be communicated to you by MyChart, letter or phone within 4 business days after the clinic has received the results. If you do not hear from us within 7 days, please contact the clinic through MyChart or phone. If you have a critical or abnormal lab result, we will notify you by phone as soon as possible.  Submit refill requests through [employer] or call your pharmacy and they will forward the refill request to us. Please allow 3 business days for your refill to be completed.          Additional Information About Your Visit        [employer] Information     [employer] lets you send messages to your doctor, view your test results, renew your prescriptions, schedule appointments and more. To sign up, go to [email or web address] . Click on \"Log in\" on the left side of the screen, which will take you to the Welcome page. Then click on \"Sign up Now\" on the right side of the page.     You will be asked to enter the access code listed below, as well as some personal information. Please follow the directions to create your username and password.     Your access code " is: [email or web address]  Expires: 2018  4:54 PM     Your access code will  in 90 days. If you need help or a new code, please call your [facility] or [phone].        Care EveryWhere ID     This is your Care EveryWhere ID. This could be used by other organizations to access your [facility] medical records  [id number]        Your Vitals Were     Pulse Temperature Respirations Pulse Oximetry BMI (Body Mass Index)       88 97.6  F (36.4  C) (Tympanic) 14 97% 26 kg/m2        Blood Pressure from Last 3 Encounters:   08/15/18 116/60   17 110/74   17 104/70    Weight from Last 3 Encounters:   08/15/18 166 lb (75.3 kg)   17 150 lb (68 kg)   17 153 lb (69.4 kg)                 Today's Medication Changes          These changes are accurate as of 8/15/18  4:54 PM.  If you have any questions, ask your nurse or doctor.               Start taking these medicines.        Dose/Directions    ibuprofen 800 MG tablet   Commonly known as:  ADVIL/MOTRIN   Used for:  Plantar fasciitis   Started by:  Katie Armas NP        Dose:  800 mg   Take 1 tablet (800 mg) by mouth every 8 hours as needed for moderate pain   Quantity:  90 tablet   Refills:  1       lidocaine (viscous) 2 % solution   Commonly known as:  XYLOCAINE   Used for:  Recurrent canker sores   Started by:  Katie Armas NP        swish and spit every 3-8 hours as needed; max 8 doses/24 hour period   Quantity:  100 mL   Refills:  5            Where to get your medicines      These medications were sent to [id number] - [place]  [address] AT [facility] [address] [address]     Phone:  [phone]     ibuprofen 800 MG tablet    lidocaine (viscous) 2 % solution                Primary Care Provider Office Phone # Fax #    Althea FlhaiderLENORE umanzor [phone] [address] [place] [id number]        Equal Access to Services      VANDANA MADDOX : Hadii aad porsha abdi Schuster, waaxda luqadaha, qaybta kaalmada ariatrishantolin, ace alejogabrieladryan dowell. So [facility] [phone].    ATENCIÓN: Si habla español, tiene a bass disposición servicios gratuitos de asistencia lingüística. Llame al [phone].    We comply with applicable federal civil rights laws and [place] laws. We do not discriminate on the basis of race, color, national origin, age, disability, sex, sexual orientation, or gender identity.            Thank you!     Thank you for choosing [facility]  for your care. Our goal is always to provide you with excellent care. Hearing back from our patients is one way we can continue to improve our services. Please take a few minutes to complete the written survey that you may receive in the mail after your visit with us. Thank you!             Your Updated Medication List - Protect others around you: Learn how to safely use, store and throw away your medicines at www.disposemymeds.org.          This list is accurate as of 8/15/18  4:54 PM.  Always use your most recent med list.                   Brand Name Dispense Instructions for use Diagnosis    ibuprofen 800 MG tablet    ADVIL/MOTRIN    90 tablet    Take 1 tablet (800 mg) by mouth every 8 hours as needed for moderate pain    Plantar fasciitis       lidocaine (viscous) 2 % solution    XYLOCAINE    100 mL    swish and spit every 3-8 hours as needed; max 8 doses/24 hour period    Recurrent canker sores       sertraline 100 MG tablet    ZOLOFT    30 tablet    TAKE 1 TABLET BY MOUTH DAILY    Anxiety

## 2018-08-15 NOTE — NURSING NOTE
"Chief Complaint   Patient presents with     Foot Pain       Initial /60 (BP Location: Right arm, Patient Position: Sitting, Cuff Size: Adult Regular)  Pulse 88  Temp 97.6  F (36.4  C) (Tympanic)  Resp 14  Wt 166 lb (75.3 kg)  SpO2 97%  BMI 26 kg/m2 Estimated body mass index is 26 kg/(m^2) as calculated from the following:    Height as of 9/11/17: 5' 7\" (1.702 m).    Weight as of this encounter: 166 lb (75.3 kg).  Medication Reconciliation: complete    Laura Marie LPN    "

## 2018-08-22 DIAGNOSIS — F41.9 ANXIETY: ICD-10-CM

## 2018-08-23 RX ORDER — SERTRALINE HYDROCHLORIDE 100 MG/1
TABLET, FILM COATED ORAL
Qty: 30 TABLET | Refills: 1 | Status: SHIPPED | OUTPATIENT
Start: 2018-08-23 | End: 2018-10-21

## 2018-12-30 DIAGNOSIS — F41.9 ANXIETY: ICD-10-CM

## 2018-12-31 RX ORDER — SERTRALINE HYDROCHLORIDE 100 MG/1
TABLET, FILM COATED ORAL
Qty: 30 TABLET | Refills: 2 | Status: SHIPPED | OUTPATIENT
Start: 2018-12-31 | End: 2019-05-02

## 2019-05-02 DIAGNOSIS — F41.9 ANXIETY: ICD-10-CM

## 2019-05-02 RX ORDER — SERTRALINE HYDROCHLORIDE 100 MG/1
TABLET, FILM COATED ORAL
Qty: 30 TABLET | Refills: 0 | Status: SHIPPED | OUTPATIENT
Start: 2019-05-02 | End: 2019-05-03

## 2019-05-02 NOTE — PROGRESS NOTES
SUBJECTIVE:   Lamberto Poole is a 23 year old male who presents to clinic today for the following   health issues:        Anxiety Follow-Up    Status since last visit: Worsened     Other associated symptoms:None    Complicating factors:   Significant life event: No   Current substance abuse: None  Depression symptoms: Yes-  See PGQ      AZRA-7 SCORE 7/19/2017 9/11/2017 5/3/2019   Total Score 7 6 19       PHQ-9 SCORE 7/19/2017 9/11/2017 5/3/2019   PHQ-9 Total Score - - -   PHQ-9 Total Score 8 2 19         AZRA-7      History of sinus tachycardia    Chest pain or pressure, left side neck or arm pain: No    Shortness of breath/increased sweats/nausea with exertion: No    Pain in calves walking 1-2 blocks: No    Worsened or new symptoms since last visit: No    Nitroglycerin use: no    Daily aspirin use: Yes            Amount of exercise or physical activity: None    Problems taking medications regularly: No    Medication side effects: none    Diet: regular (no restrictions)        Additional history: as documented    Reviewed  and updated as needed this visit by clinical staff  Tobacco  Allergies  Meds         Reviewed and updated as needed this visit by Provider         Patient Active Problem List   Diagnosis     Chronic rhinitis     Anxiety     History of sinus tachycardia     ACP (advance care planning)     No past surgical history on file.    Social History     Tobacco Use     Smoking status: Never Smoker     Smokeless tobacco: Former User   Substance Use Topics     Alcohol use: No     Family History   Problem Relation Age of Onset     Hypertension Father      Heart Murmur Father          Current Outpatient Medications   Medication Sig Dispense Refill     diphenhydrAMINE (BENADRYL) 25 MG tablet Take 25 mg by mouth every 6 hours as needed for itching or allergies Takes 2 tabs every HS for sleep       ibuprofen (ADVIL/MOTRIN) 800 MG tablet Take 1 tablet (800 mg) by mouth every 8 hours as needed for moderate pain 90  tablet 1     lidocaine, viscous, (XYLOCAINE) 2 % solution swish and spit every 3-8 hours as needed; max 8 doses/24 hour period 100 mL 5     sertraline (ZOLOFT) 100 MG tablet TAKE 1 TABLET BY MOUTH DAILY 30 tablet 0     No Known Allergies  Recent Labs   Lab Test 05/03/17  1031 06/03/14  1540 05/05/14  1619   CR 0.91 1.01*  --    GFRESTIMATED >90  Non  GFR Calc   >90  --    GFRESTBLACK >90   GFR Calc   >90  --    POTASSIUM 4.0 3.8  --    TSH 1.68  --  1.06      BP Readings from Last 3 Encounters:   05/03/19 120/70   08/15/18 116/60   09/11/17 110/74    Wt Readings from Last 3 Encounters:   05/03/19 80.3 kg (177 lb)   08/15/18 75.3 kg (166 lb)   09/11/17 68 kg (150 lb)                  Labs reviewed in EPIC    ROS:  Constitutional, HEENT, cardiovascular, pulmonary, gi and gu systems are negative, except as otherwise noted.    OBJECTIVE:     /70 (BP Location: Right arm, Patient Position: Sitting, Cuff Size: Adult Regular)   Pulse 80   Resp 14   Wt 80.3 kg (177 lb)   BMI 27.72 kg/m    Body mass index is 27.72 kg/m .     GENERAL: healthy, alert and no distress  EYES: Eyes grossly normal to inspection, PERRL and conjunctivae and sclerae normal  HENT: ear canals and TM's normal, nose and mouth without ulcers or lesions  NECK: no adenopathy, no asymmetry, masses, or scars and thyroid normal to palpation  RESP: lungs clear to auscultation - no rales, rhonchi or wheezes  CV: regular rate and rhythm, normal S1 S2, no S3 or S4, no murmur, click or rub, no peripheral edema and peripheral pulses strong  SKIN: no suspicious lesions or rashes  PSYCH: anxious      ASSESSMENT/PLAN:     1. Anxiety  - citalopram (CELEXA) 20 MG tablet; Take 1 tablet (20 mg) by mouth daily  Dispense: 30 tablet; Refill: 2  - discontinue Zoloft    Follow-up 1 month, sooner as needed      Althea Barragan NP  [facility]

## 2019-05-03 ENCOUNTER — OFFICE VISIT (OUTPATIENT)
Dept: FAMILY MEDICINE | Facility: OTHER | Age: 24
End: 2019-05-03
Attending: NURSE PRACTITIONER
Payer: COMMERCIAL

## 2019-05-03 VITALS
BODY MASS INDEX: 27.72 KG/M2 | RESPIRATION RATE: 14 BRPM | WEIGHT: 177 LBS | HEART RATE: 80 BPM | DIASTOLIC BLOOD PRESSURE: 70 MMHG | SYSTOLIC BLOOD PRESSURE: 120 MMHG

## 2019-05-03 DIAGNOSIS — F41.9 ANXIETY: Primary | ICD-10-CM

## 2019-05-03 PROCEDURE — 99213 OFFICE O/P EST LOW 20 MIN: CPT | Performed by: NURSE PRACTITIONER

## 2019-05-03 RX ORDER — CITALOPRAM HYDROBROMIDE 20 MG/1
20 TABLET ORAL DAILY
Qty: 30 TABLET | Refills: 2 | Status: SHIPPED | OUTPATIENT
Start: 2019-05-03 | End: 2019-06-18

## 2019-05-03 RX ORDER — DIPHENHYDRAMINE HCL 25 MG
25 TABLET ORAL EVERY 6 HOURS PRN
COMMUNITY
End: 2020-01-17

## 2019-05-03 ASSESSMENT — ANXIETY QUESTIONNAIRES
7. FEELING AFRAID AS IF SOMETHING AWFUL MIGHT HAPPEN: MORE THAN HALF THE DAYS
1. FEELING NERVOUS, ANXIOUS, OR ON EDGE: NEARLY EVERY DAY
2. NOT BEING ABLE TO STOP OR CONTROL WORRYING: NEARLY EVERY DAY
IF YOU CHECKED OFF ANY PROBLEMS ON THIS QUESTIONNAIRE, HOW DIFFICULT HAVE THESE PROBLEMS MADE IT FOR YOU TO DO YOUR WORK, TAKE CARE OF THINGS AT HOME, OR GET ALONG WITH OTHER PEOPLE: EXTREMELY DIFFICULT
5. BEING SO RESTLESS THAT IT IS HARD TO SIT STILL: MORE THAN HALF THE DAYS
3. WORRYING TOO MUCH ABOUT DIFFERENT THINGS: NEARLY EVERY DAY
6. BECOMING EASILY ANNOYED OR IRRITABLE: NEARLY EVERY DAY
4. TROUBLE RELAXING: NEARLY EVERY DAY
GAD7 TOTAL SCORE: 19

## 2019-05-03 ASSESSMENT — PATIENT HEALTH QUESTIONNAIRE - PHQ9: SUM OF ALL RESPONSES TO PHQ QUESTIONS 1-9: 19

## 2019-05-03 NOTE — PATIENT INSTRUCTIONS
ASSESSMENT/PLAN:     1. Anxiety  - citalopram (CELEXA) 20 MG tablet; Take 1 tablet (20 mg) by mouth daily  Dispense: 30 tablet; Refill: 2  - discontinue Zoloft    Follow-up 1 month, sooner as needed      Althea Barragan NP  [facility] [place] [facility]

## 2019-05-03 NOTE — NURSING NOTE
"Chief Complaint   Patient presents with     Anxiety       Initial /70 (BP Location: Right arm, Patient Position: Sitting, Cuff Size: Adult Regular)   Pulse 80   Resp 14   Wt 80.3 kg (177 lb)   BMI 27.72 kg/m   Estimated body mass index is 27.72 kg/m  as calculated from the following:    Height as of 9/11/17: 1.702 m (5' 7\").    Weight as of this encounter: 80.3 kg (177 lb).  Medication Reconciliation: complete    Rachel Gutierres LPN    "

## 2019-05-04 ASSESSMENT — ANXIETY QUESTIONNAIRES: GAD7 TOTAL SCORE: 19

## 2019-06-17 NOTE — PROGRESS NOTES
Lamberto Poole is a 24 year old male who presents to clinic today for the following health issues:        Anxiety Follow-Up  Is on Celexa    How are you doing with your anxiety since your last visit?  Improved pt states slightly    Are you having other symptoms that might be associated with anxiety? Yes:  panic attacks    Have you had a significant life event? No     Are you feeling depressed? No    Do you have any concerns with your use of alcohol or other drugs? No    Social History     Tobacco Use     Smoking status: Never Smoker     Smokeless tobacco: Former User   Substance Use Topics     Alcohol use: No     Drug use: No       AZRA-7 SCORE 9/11/2017 5/3/2019 6/18/2019   Total Score 6 19 14     PHQ-9 SCORE 9/11/2017 5/3/2019 6/18/2019   PHQ-9 Total Score - - -   PHQ-9 Total Score 2 19 12         Acute Illness   Acute illness concerns: throat pain, tired, swollen cervical nodes, bilateral ear pain  Onset: 4 days    Fever: YES    Chills/Sweats: YES    Headache (location?): no    Sinus Pressure:no    Conjunctivitis:  no    Ear Pain: YES- Bilateral    Rhinorrhea: no    Congestion: no    Sore Throat: YES     Cough: no    Wheeze: no    Decreased Appetite: no    Nausea: no    Vomiting: no    Diarrhea:  no    Dysuria/Freq.: no    Fatigue/Achiness: yes    Sick/Strep Exposure: no     Therapies Tried and outcome: OTC cold           Amount of exercise or physical activity: None    Problems taking medications regularly: No    Medication side effects: none    Diet: regular (no restrictions)        Patient Active Problem List   Diagnosis     Chronic rhinitis     Anxiety     History of sinus tachycardia     ACP (advance care planning)     History reviewed. No pertinent surgical history.    Social History     Tobacco Use     Smoking status: Never Smoker     Smokeless tobacco: Former User   Substance Use Topics     Alcohol use: No     Family History   Problem Relation Age of Onset     Hypertension Father      Heart Murmur Father  "         Current Outpatient Medications   Medication Sig Dispense Refill     citalopram (CELEXA) 20 MG tablet Take 1 tablet (20 mg) by mouth daily 30 tablet 2     diphenhydrAMINE (BENADRYL) 25 MG tablet Take 25 mg by mouth every 6 hours as needed for itching or allergies Takes 2 tabs every HS for sleep       ibuprofen (ADVIL/MOTRIN) 800 MG tablet Take 1 tablet (800 mg) by mouth every 8 hours as needed for moderate pain 90 tablet 1     lidocaine, viscous, (XYLOCAINE) 2 % solution swish and spit every 3-8 hours as needed; max 8 doses/24 hour period 100 mL 5     No Known Allergies  Recent Labs   Lab Test 05/03/17  1031 06/03/14  1540 05/05/14  1619   CR 0.91 1.01*  --    GFRESTIMATED >90  Non  GFR Calc   >90  --    GFRESTBLACK >90   GFR Calc   >90  --    POTASSIUM 4.0 3.8  --    TSH 1.68  --  1.06      BP Readings from Last 3 Encounters:   06/18/19 126/70   05/03/19 120/70   08/15/18 116/60    Wt Readings from Last 3 Encounters:   06/18/19 79.1 kg (174 lb 6.4 oz)   05/03/19 80.3 kg (177 lb)   08/15/18 75.3 kg (166 lb)               Reviewed and updated as needed this visit by Provider         Review of Systems   ROS COMP: Constitutional, HEENT, cardiovascular, pulmonary, gi and gu systems are negative, except as otherwise noted.      Objective    /70 (BP Location: Right arm, Patient Position: Sitting, Cuff Size: Adult Regular)   Pulse 88   Temp 97.9  F (36.6  C) (Tympanic)   Resp 16   Ht 1.702 m (5' 7\")   Wt 79.1 kg (174 lb 6.4 oz)   SpO2 98%   BMI 27.31 kg/m    Body mass index is 27.31 kg/m .       Physical Exam   GENERAL: healthy, alert and no distress  EYES: Eyes grossly normal to inspection, PERRL and conjunctivae and sclerae normal  HENT: both ears: erythematous and bulging membrane and large aphthous ulcer - left tonsillar fossa  NECK: no adenopathy, no asymmetry, masses, or scars and thyroid normal to palpation  RESP: lungs clear to auscultation - no rales, rhonchi or " wheezes  CV: regular rate and rhythm, normal S1 S2, no S3 or S4, no murmur, click or rub, no peripheral edema and peripheral pulses strong  SKIN: no suspicious lesions or rashes  PSYCH: Mood seems slightly improved, still a bit anxious            Assessment & Plan     1. Anxiety  - citalopram (CELEXA) 20 MG tablet; 1.5 tabs daily for 30 mg  Dispense: 45 tablet; Refill: 5    2. Acute serous otitis media, recurrence not specified, unspecified laterality  - azithromycin (ZITHROMAX) 250 MG tablet; Take 2 tablets (500 mg) by mouth daily for 1 day, THEN 1 tablet (250 mg) daily for 9 days.  Dispense: 11 tablet; Refill: 0      Fluids  Rest  Temp control  Humidity at home (add bacteriostatic solution to humidifier)  Please return in you do not improve  To UC or ER with persistent, worsening, or worrisome symptoms      3. Aphthous ulcer  - Xylocaine as at home        Return in about 3 months (around 9/18/2019) for Mood disorder FU. - sooner as needed        Althea Barragan NP  [facility]

## 2019-06-18 ENCOUNTER — OFFICE VISIT (OUTPATIENT)
Dept: FAMILY MEDICINE | Facility: OTHER | Age: 24
End: 2019-06-18
Attending: NURSE PRACTITIONER
Payer: COMMERCIAL

## 2019-06-18 VITALS
WEIGHT: 174.4 LBS | BODY MASS INDEX: 27.37 KG/M2 | SYSTOLIC BLOOD PRESSURE: 126 MMHG | RESPIRATION RATE: 16 BRPM | HEIGHT: 67 IN | TEMPERATURE: 97.9 F | HEART RATE: 88 BPM | OXYGEN SATURATION: 98 % | DIASTOLIC BLOOD PRESSURE: 70 MMHG

## 2019-06-18 DIAGNOSIS — H65.00 ACUTE SEROUS OTITIS MEDIA, RECURRENCE NOT SPECIFIED, UNSPECIFIED LATERALITY: ICD-10-CM

## 2019-06-18 DIAGNOSIS — K12.0 APHTHOUS ULCER: ICD-10-CM

## 2019-06-18 DIAGNOSIS — F41.9 ANXIETY: Primary | ICD-10-CM

## 2019-06-18 PROCEDURE — 99214 OFFICE O/P EST MOD 30 MIN: CPT | Performed by: NURSE PRACTITIONER

## 2019-06-18 RX ORDER — CITALOPRAM HYDROBROMIDE 20 MG/1
TABLET ORAL
Qty: 45 TABLET | Refills: 5 | Status: SHIPPED | OUTPATIENT
Start: 2019-06-18 | End: 2019-07-03

## 2019-06-18 RX ORDER — AZITHROMYCIN 250 MG/1
TABLET, FILM COATED ORAL
Qty: 11 TABLET | Refills: 0 | Status: SHIPPED | OUTPATIENT
Start: 2019-06-18 | End: 2019-07-03

## 2019-06-18 ASSESSMENT — ANXIETY QUESTIONNAIRES
3. WORRYING TOO MUCH ABOUT DIFFERENT THINGS: MORE THAN HALF THE DAYS
5. BEING SO RESTLESS THAT IT IS HARD TO SIT STILL: MORE THAN HALF THE DAYS
GAD7 TOTAL SCORE: 14
2. NOT BEING ABLE TO STOP OR CONTROL WORRYING: MORE THAN HALF THE DAYS
1. FEELING NERVOUS, ANXIOUS, OR ON EDGE: MORE THAN HALF THE DAYS
6. BECOMING EASILY ANNOYED OR IRRITABLE: SEVERAL DAYS
IF YOU CHECKED OFF ANY PROBLEMS ON THIS QUESTIONNAIRE, HOW DIFFICULT HAVE THESE PROBLEMS MADE IT FOR YOU TO DO YOUR WORK, TAKE CARE OF THINGS AT HOME, OR GET ALONG WITH OTHER PEOPLE: EXTREMELY DIFFICULT
7. FEELING AFRAID AS IF SOMETHING AWFUL MIGHT HAPPEN: NEARLY EVERY DAY

## 2019-06-18 ASSESSMENT — MIFFLIN-ST. JEOR: SCORE: 1744.7

## 2019-06-18 ASSESSMENT — PATIENT HEALTH QUESTIONNAIRE - PHQ9
5. POOR APPETITE OR OVEREATING: MORE THAN HALF THE DAYS
SUM OF ALL RESPONSES TO PHQ QUESTIONS 1-9: 12

## 2019-06-18 ASSESSMENT — PAIN SCALES - GENERAL: PAINLEVEL: NO PAIN (0)

## 2019-06-18 NOTE — NURSING NOTE
"Chief Complaint   Patient presents with     Anxiety       Initial /70 (BP Location: Right arm, Patient Position: Sitting, Cuff Size: Adult Regular)   Pulse 88   Temp 97.9  F (36.6  C) (Tympanic)   Resp 16   Ht 1.702 m (5' 7\")   Wt 79.1 kg (174 lb 6.4 oz)   SpO2 98%   BMI 27.31 kg/m   Estimated body mass index is 27.31 kg/m  as calculated from the following:    Height as of this encounter: 1.702 m (5' 7\").    Weight as of this encounter: 79.1 kg (174 lb 6.4 oz).  Medication Reconciliation: complete      "

## 2019-06-18 NOTE — PATIENT INSTRUCTIONS
Assessment & Plan     1. Anxiety  - citalopram (CELEXA) 20 MG tablet; 1.5 tabs daily for 30 mg  Dispense: 45 tablet; Refill: 5    2. Acute serous otitis media, recurrence not specified, unspecified laterality  - azithromycin (ZITHROMAX) 250 MG tablet; Take 2 tablets (500 mg) by mouth daily for 1 day, THEN 1 tablet (250 mg) daily for 9 days.  Dispense: 11 tablet; Refill: 0      Fluids  Rest  Temp control  Humidity at home (add bacteriostatic solution to humidifier)  Please return in you do not improve  To UC or ER with persistent, worsening, or worrisome symptoms      3. Aphthous ulcer  - Xylocaine as at home        Return in about 3 months (around 9/18/2019) for Mood disorder FU. - sooner as needed        Althea Barragan NP  [facility] [place] [facility]

## 2019-06-19 ASSESSMENT — ANXIETY QUESTIONNAIRES: GAD7 TOTAL SCORE: 14

## 2019-06-26 ENCOUNTER — TELEPHONE (OUTPATIENT)
Dept: FAMILY MEDICINE | Facility: OTHER | Age: 24
End: 2019-06-26

## 2019-06-26 NOTE — TELEPHONE ENCOUNTER
10:09 AM    Reason for Call: OVERBOOK    Patient is having the following symptoms:dizziness for 1  weeks.    The patient is requesting an appointment for dizziness with SHUKRI Barragan.    Was an appointment offered for this call? No  If yes : Appointment type              Date    Preferred method for responding to this message: Telephone Call  What is your phone number ? [phone]    If we cannot reach you directly, may we leave a detailed response at the number you provided? Yes    Can this message wait until your PCP/provider returns, if unavailable today? Not applicable, provider is in    Kelly Shoen

## 2019-06-26 NOTE — TELEPHONE ENCOUNTER
Left message for pt stating that  Recommended pt go the ER/UC.   Told pt to call back with any questions.

## 2019-07-03 DIAGNOSIS — F41.9 ANXIETY: Primary | ICD-10-CM

## 2019-07-03 RX ORDER — SERTRALINE HYDROCHLORIDE 100 MG/1
100 TABLET, FILM COATED ORAL DAILY
Qty: 90 TABLET | Refills: 1 | Status: SHIPPED | OUTPATIENT
Start: 2019-07-03 | End: 2019-12-05

## 2019-07-05 ENCOUNTER — E-VISIT (OUTPATIENT)
Dept: FAMILY MEDICINE | Facility: OTHER | Age: 24
End: 2019-07-05
Payer: COMMERCIAL

## 2019-07-05 DIAGNOSIS — J01.00 SUBACUTE MAXILLARY SINUSITIS: Primary | ICD-10-CM

## 2019-07-05 PROCEDURE — 99444 ZZC PHYSICIAN ONLINE EVALUATION & MANAGEMENT SERVICE: CPT | Performed by: NURSE PRACTITIONER

## 2019-07-05 NOTE — TELEPHONE ENCOUNTER
Patient mom called wondering if patient should come in, if necessary patient is able.  Notified mom I had just spoken to patient and informed him I would route to you information.

## 2019-07-05 NOTE — PATIENT INSTRUCTIONS

## 2019-07-05 NOTE — TELEPHONE ENCOUNTER
Patient called to see if his e visit had been reviewed.  Is wondering about getting started on an antibiotic.  Please advise

## 2019-07-05 NOTE — TELEPHONE ENCOUNTER
Antibiotic sent to pharmacy    Fluids  Rest  Temp control  Humidity at home (add bacteriostatic solution to humidifier)  Mucinex liquid OTC PRN  Please return in you do not improve  To UC or ER with persistent, worsening, or worrisome symptoms      Althea JOLLEY-SUNGP  [phone]

## 2019-07-10 ENCOUNTER — TRANSFERRED RECORDS (OUTPATIENT)
Dept: HEALTH INFORMATION MANAGEMENT | Facility: CLINIC | Age: 24
End: 2019-07-10

## 2019-07-20 ENCOUNTER — E-VISIT (OUTPATIENT)
Dept: FAMILY MEDICINE | Facility: OTHER | Age: 24
End: 2019-07-20
Payer: COMMERCIAL

## 2019-07-20 DIAGNOSIS — F41.9 ANXIETY: Primary | ICD-10-CM

## 2019-07-20 ASSESSMENT — ANXIETY QUESTIONNAIRES
GAD7 TOTAL SCORE: 17
6. BECOMING EASILY ANNOYED OR IRRITABLE: SEVERAL DAYS
2. NOT BEING ABLE TO STOP OR CONTROL WORRYING: NEARLY EVERY DAY
7. FEELING AFRAID AS IF SOMETHING AWFUL MIGHT HAPPEN: NEARLY EVERY DAY
GAD7 TOTAL SCORE: 17
7. FEELING AFRAID AS IF SOMETHING AWFUL MIGHT HAPPEN: NEARLY EVERY DAY
4. TROUBLE RELAXING: NEARLY EVERY DAY
GAD7 TOTAL SCORE: 17
1. FEELING NERVOUS, ANXIOUS, OR ON EDGE: NEARLY EVERY DAY
5. BEING SO RESTLESS THAT IT IS HARD TO SIT STILL: SEVERAL DAYS
3. WORRYING TOO MUCH ABOUT DIFFERENT THINGS: NEARLY EVERY DAY

## 2019-07-20 ASSESSMENT — PATIENT HEALTH QUESTIONNAIRE - PHQ9
SUM OF ALL RESPONSES TO PHQ QUESTIONS 1-9: 15
10. IF YOU CHECKED OFF ANY PROBLEMS, HOW DIFFICULT HAVE THESE PROBLEMS MADE IT FOR YOU TO DO YOUR WORK, TAKE CARE OF THINGS AT HOME, OR GET ALONG WITH OTHER PEOPLE: EXTREMELY DIFFICULT
SUM OF ALL RESPONSES TO PHQ QUESTIONS 1-9: 15

## 2019-07-21 ASSESSMENT — ANXIETY QUESTIONNAIRES: GAD7 TOTAL SCORE: 17

## 2019-07-21 ASSESSMENT — PATIENT HEALTH QUESTIONNAIRE - PHQ9: SUM OF ALL RESPONSES TO PHQ QUESTIONS 1-9: 15

## 2019-07-24 ENCOUNTER — NURSE TRIAGE (OUTPATIENT)
Dept: FAMILY MEDICINE | Facility: OTHER | Age: 24
End: 2019-07-24

## 2019-07-24 NOTE — TELEPHONE ENCOUNTER
Britney Garrison Anne, NP 1 hour ago (12:14 PM)      Spoke with patient and updated him on provider's statement, offered additional information on mental health care providers. Patient stated he does have a mental health appointment set up for next month and stated understanding of provider's decline in regards to Ativan. No further action or request needed at this time.     Luis Garrison LPN/EVIE    Routing comment

## 2019-07-24 NOTE — TELEPHONE ENCOUNTER
Patient called stating he was seen in ER on 07/10/19 for Dizziness and Anxiety.  Patient state he was prescribed Ativan which seemed to help. Patient states he is experiencing mild dizziness which exacerbates his anxiety.  Patient states due to symptoms he has been sleeping a lot and not wanting to do his normal daily activities.  Patient has appointment on 07/30/19 with PCP but would like Ativan filled prior.   Patient states he was given Ativan 0.5 MG, one table every 6 hours as needed.    Please advise.  Pharmacy pended.      Reason for Disposition    [1] MILD dizziness (e.g., vertigo; walking normally) AND [2] has been evaluated by physician for this    Additional Information    Negative: Followed a head injury    Negative: Followed an ear injury    Negative: Localized weakness or numbness is main symptom    Negative: Dizziness relates to riding in a car, going to an amusement park, etc.    Negative: [1] Dizziness is main symptom AND [2] NO spinning sensation (i.e., vertigo)    Negative: [1] Weakness (i.e., paralysis, loss of muscle strength) of the face, arm or leg on one side of the body AND [2] sudden onset AND [3] present now    Negative: [1] Numbness (i.e., loss of sensation) of the face, arm or leg on one side of the body AND [2] sudden onset AND [3] present now    Negative: [1] Loss of speech or garbled speech AND [2] sudden onset AND [3] present now    Negative: Difficult to awaken or acting confused (e.g., disoriented, slurred speech)    Negative: Sounds like a life-threatening emergency to the triager    Negative: SEVERE dizziness (vertigo) (e.g., unable to walk without assistance)    Negative: [1] Dizziness (vertigo) present now AND [2] one or more stroke risk factors (i.e., hypertension, diabetes, prior stroke/TIA/heart attack)  (Exception: prior physician evaluation for this AND no different/worse than usual)    Negative: [1] Dizziness (vertigo) present now AND [2] age > 60  (Exception: prior  "physician evaluation for this AND no different/worse than usual)    Negative: Severe headache (e.g., excruciating)  (Exception: similar to previous migraines)    Negative: Patient sounds very sick or weak to the triager    Negative: Taking a medicine that could cause dizziness (e.g., phenytoin [Dilantin], carbamazepine [Tegretol], primidone [Mysoline])    Negative: [1] MODERATE dizziness (e.g., vertigo; feels very unsteady, interferes with normal activities) AND [2] has NOT been evaluated by physician for this    Negative: Earache    Negative: Vomiting occurs with dizziness    Negative: [1] MODERATE dizziness (e.g., vertigo; feels very unsteady, interferes with normal activities) AND [2] has been evaluated by physician for this    Negative: [1] MILD dizziness (e.g., vertigo; walking normally) AND [2] has NOT been evaluated by physician for this    Negative: [1] NO dizziness now AND [2] one or more stroke risk factors (i.e., hypertension, diabetes, prior stroke/TIA/heart attack)    Negative: [1] NO dizziness now AND [2] age > 60    Negative: Decreased hearing    Negative: Known or suspected alcohol or drug abuse    Answer Assessment - Initial Assessment Questions  1. DESCRIPTION: \"Describe your dizziness.\"      Patient feels like he his spinning  2. VERTIGO: \"Do you feel like either you or the room is spinning or tilting?\"       Yes. Intermittently patient feels like he is spinning  3. LIGHTHEADED: \"Do you feel lightheaded?\" (e.g., somewhat faint, woozy, weak upon standing)      no  4. SEVERITY: \"How bad is it?\"  \"Can you walk?\"    - MILD - Feels unsteady but walking normally.    - MODERATE - Feels very unsteady when walking, but not falling; interferes with normal activities (e.g., school, work) .    - SEVERE - Unable to walk without falling (requires assistance).      mild  5. ONSET:  \"When did the dizziness begin?\"      Every summer  6. AGGRAVATING FACTORS: \"Does anything make it worse?\" (e.g., standing, change in " "head position)      Summer, when patient stands  7. CAUSE: \"What do you think is causing the dizziness?\"      vertigo  8. RECURRENT SYMPTOM: \"Have you had dizziness before?\" If so, ask: \"When was the last time?\" \"What happened that time?\"      Every summer  9. OTHER SYMPTOMS: \"Do you have any other symptoms?\" (e.g., headache, weakness, numbness, vomiting, earache)      no  10. PREGNANCY: \"Is there any chance you are pregnant?\" \"When was your last menstrual period?\"        no    Protocols used: DIZZINESS - VERTIGO-A-AH      "

## 2019-07-24 NOTE — TELEPHONE ENCOUNTER
I am not comfortable with ativan if he is experiencing dizziness.  He needs to see a mental health provider.  Please give him information about mental health provider in [place].    Althea HERRERA  [phone]

## 2019-08-27 NOTE — PATIENT INSTRUCTIONS
ASSESSMENT/PLAN:   1. Routine general medical examination at a health care facility  - CBC with platelets differential  - TSH with free T4 reflex    2. Anxiety  - CBC with platelets differential  - TSH with free T4 reflex  - Continue Zoloft as prescribed    3.  Insomnia  - Trazodone as prescribed, as needed, for sleep            Preventive Health Recommendations  Male Ages 21 - 25     Yearly exam:             See your health care provider every year in order to  o   Review health changes.   o   Discuss preventive care.    o   Review your medicines if your doctor has prescribed any.    You should be tested each year for STDs (sexually transmitted diseases).     Talk to your provider about cholesterol testing.      If you are at risk for diabetes, you should have a diabetes test (fasting glucose).    Shots: Get a flu shot each year. Get a tetanus shot every 10 years.     Nutrition:    Eat at least 5 servings of fruits and vegetables daily.     Eat whole-grain bread, whole-wheat pasta and brown rice instead of white grains and rice.     Get adequate calcium and Vitamin D.     Lifestyle    Exercise for at least 150 minutes a week (30 minutes a day, 5 days a week). This will help you control your weight and prevent disease.     Limit alcohol to one drink per day.     No smoking.     Wear sunscreen to prevent skin cancer.     See your dentist every six months for an exam and cleaning.

## 2019-08-27 NOTE — PROGRESS NOTES
SUBJECTIVE:   CC: Lamberto Poole is an 24 year old male who presents for preventive health visit.       Healthy Habits:    Do you get at least three servings of calcium containing foods daily (dairy, green leafy vegetables, etc.)? yes    Amount of exercise or daily activities, outside of work: none    Problems taking medications regularly No    Medication side effects: No    Have you had an eye exam in the past two years? yes    Do you see a dentist twice per year?no    Do you have sleep apnea, excessive snoring or daytime drowsiness?no      Anxiety Follow-Up    How are you doing with your depression since your last visit? Improved but can be better    How are you doing with your anxiety since your last visit?  Improved but can be better    Are you having other symptoms that might be associated with depression or anxiety? Yes:  insomnia    Have you had a significant life event? No     Do you have any concerns with your use of alcohol or other drugs? No      Social History     Tobacco Use     Smoking status: Never Smoker     Smokeless tobacco: Former User   Substance Use Topics     Alcohol use: No     Drug use: No     PHQ 6/18/2019 7/20/2019 8/30/2019   PHQ-9 Total Score 12 15 13   Q9: Thoughts of better off dead/self-harm past 2 weeks Not at all Not at all Not at all     AZRA-7 SCORE 6/18/2019 7/20/2019 8/30/2019   Total Score - 17 (severe anxiety) -   Total Score 14 17 11       Suicide Assessment Five-step Evaluation and Treatment (SAFE-T)        Abuse: Current or Past(Physical, Sexual or Emotional)- No  Do you feel safe in your environment? Yes    Social History     Tobacco Use     Smoking status: Never Smoker     Smokeless tobacco: Former User   Substance Use Topics     Alcohol use: No     If you drink alcohol do you typically have >3 drinks per day or >7 drinks per week? No                      Last PSA: No results found for: PSA    Reviewed orders with patient. Reviewed health maintenance and updated orders  accordingly - Yes  Lab work is in process  Labs reviewed in EPIC  BP Readings from Last 3 Encounters:   08/30/19 126/72   06/18/19 126/70   05/03/19 120/70    Wt Readings from Last 3 Encounters:   08/30/19 74.4 kg (164 lb)   06/18/19 79.1 kg (174 lb 6.4 oz)   05/03/19 80.3 kg (177 lb)                  Patient Active Problem List   Diagnosis     Chronic rhinitis     Anxiety     History of sinus tachycardia     ACP (advance care planning)     No past surgical history on file.    Social History     Tobacco Use     Smoking status: Never Smoker     Smokeless tobacco: Former User   Substance Use Topics     Alcohol use: No     Family History   Problem Relation Age of Onset     Hypertension Father      Heart Murmur Father          Current Outpatient Medications   Medication Sig Dispense Refill     diphenhydrAMINE (BENADRYL) 25 MG tablet Take 25 mg by mouth every 6 hours as needed for itching or allergies Takes 2 tabs every HS for sleep       ibuprofen (ADVIL/MOTRIN) 800 MG tablet Take 1 tablet (800 mg) by mouth every 8 hours as needed for moderate pain 90 tablet 1     lidocaine, viscous, (XYLOCAINE) 2 % solution swish and spit every 3-8 hours as needed; max 8 doses/24 hour period 100 mL 5     sertraline (ZOLOFT) 100 MG tablet Take 1 tablet (100 mg) by mouth daily 90 tablet 1     No Known Allergies  Recent Labs   Lab Test 05/03/17  1031 06/03/14  1540 05/05/14  1619   CR 0.91 1.01*  --    GFRESTIMATED >90  Non  GFR Calc   >90  --    GFRESTBLACK >90   GFR Calc   >90  --    POTASSIUM 4.0 3.8  --    TSH 1.68  --  1.06        Reviewed and updated as needed this visit by clinical staff  Tobacco  Allergies  Meds         Reviewed and updated as needed this visit by Provider        Past Medical History:   Diagnosis Date     Chronic rhinitis 5/5/2014     History of sinus tachycardia 2/11/2016      No past surgical history on file.    ROS:  CONSTITUTIONAL: NEGATIVE for fever, chills, change in  "weight  INTEGUMENTARY/SKIN: NEGATIVE for worrisome rashes, moles or lesions  EYES: NEGATIVE for vision changes or irritation  ENT: NEGATIVE for ear, mouth and throat problems  RESP: NEGATIVE for significant cough or SOB  CV: NEGATIVE for chest pain, palpitations or peripheral edema  GI: NEGATIVE for nausea, abdominal pain, heartburn, or change in bowel habits   male: negative for dysuria, hematuria, decreased urinary stream, erectile dysfunction, urethral discharge  MUSCULOSKELETAL: NEGATIVE for significant arthralgias or myalgia  NEURO: NEGATIVE for weakness, dizziness or paresthesias  PSYCHIATRIC: NEGATIVE for changes in mood or affect      OBJECTIVE:   /72 (BP Location: Left arm, Patient Position: Sitting, Cuff Size: Adult Large)   Pulse 80   Temp 97.4  F (36.3  C) (Tympanic)   Resp 14   Ht 1.67 m (5' 5.75\")   Wt 74.4 kg (164 lb)   SpO2 97%   BMI 26.67 kg/m         EXAM:  GENERAL: healthy, alert and no distress  EYES: Eyes grossly normal to inspection, PERRL and conjunctivae and sclerae normal  HENT: ear canals and TM's normal, nose and mouth without ulcers or lesions  NECK: no adenopathy, no asymmetry, masses, or scars and thyroid normal to palpation  RESP: lungs clear to auscultation - no rales, rhonchi or wheezes  CV: regular rate and rhythm, normal S1 S2, no S3 or S4, no murmur, click or rub, no peripheral edema and peripheral pulses strong  ABDOMEN: soft, nontender, no hepatosplenomegaly, no masses and bowel sounds normal  MS: no gross musculoskeletal defects noted, no edema  SKIN: no suspicious lesions or rashes  PSYCH: mentation appears normal, affect normal/bright      ASSESSMENT/PLAN:   1. Routine general medical examination at a health care facility  - CBC with platelets differential  - TSH with free T4 reflex    2. Anxiety  - CBC with platelets differential  - TSH with free T4 reflex  - Continue Zoloft as prescribed    3.  Insomnia  - Trazodone as prescribed, as needed, for " "sleep        COUNSELING:  Reviewed preventive health counseling, as reflected in patient instructions       Regular exercise       Healthy diet/nutrition    Estimated body mass index is 26.67 kg/m  as calculated from the following:    Height as of this encounter: 1.67 m (5' 5.75\").    Weight as of this encounter: 74.4 kg (164 lb).         reports that he has never smoked. He has quit using smokeless tobacco.      Counseling Resources:  ATP IV Guidelines  Pooled Cohorts Equation Calculator  FRAX Risk Assessment  ICSI Preventive Guidelines  Dietary Guidelines for Americans, 2010  USDA's MyPlate  ASA Prophylaxis  Lung CA Screening    Althea Barragan NP  [facility]  "

## 2019-08-30 ENCOUNTER — OFFICE VISIT (OUTPATIENT)
Dept: FAMILY MEDICINE | Facility: OTHER | Age: 24
End: 2019-08-30
Attending: NURSE PRACTITIONER
Payer: COMMERCIAL

## 2019-08-30 VITALS
RESPIRATION RATE: 14 BRPM | WEIGHT: 164 LBS | HEIGHT: 66 IN | BODY MASS INDEX: 26.36 KG/M2 | HEART RATE: 80 BPM | OXYGEN SATURATION: 97 % | SYSTOLIC BLOOD PRESSURE: 126 MMHG | DIASTOLIC BLOOD PRESSURE: 72 MMHG | TEMPERATURE: 97.4 F

## 2019-08-30 DIAGNOSIS — Z00.00 ROUTINE GENERAL MEDICAL EXAMINATION AT A HEALTH CARE FACILITY: Primary | ICD-10-CM

## 2019-08-30 DIAGNOSIS — F51.01 PRIMARY INSOMNIA: ICD-10-CM

## 2019-08-30 DIAGNOSIS — F41.9 ANXIETY: ICD-10-CM

## 2019-08-30 LAB
BASOPHILS # BLD AUTO: 0 10E9/L (ref 0–0.2)
BASOPHILS NFR BLD AUTO: 0.2 %
DIFFERENTIAL METHOD BLD: NORMAL
EOSINOPHIL # BLD AUTO: 0.1 10E9/L (ref 0–0.7)
EOSINOPHIL NFR BLD AUTO: 1.2 %
ERYTHROCYTE [DISTWIDTH] IN BLOOD BY AUTOMATED COUNT: 13 % (ref 10–15)
HCT VFR BLD AUTO: 42.7 % (ref 40–53)
HGB BLD-MCNC: 14.8 G/DL (ref 13.3–17.7)
LYMPHOCYTES # BLD AUTO: 1.2 10E9/L (ref 0.8–5.3)
LYMPHOCYTES NFR BLD AUTO: 24.7 %
MCH RBC QN AUTO: 30.1 PG (ref 26.5–33)
MCHC RBC AUTO-ENTMCNC: 34.7 G/DL (ref 31.5–36.5)
MCV RBC AUTO: 87 FL (ref 78–100)
MONOCYTES # BLD AUTO: 0.6 10E9/L (ref 0–1.3)
MONOCYTES NFR BLD AUTO: 12.6 %
NEUTROPHILS # BLD AUTO: 3 10E9/L (ref 1.6–8.3)
NEUTROPHILS NFR BLD AUTO: 61.3 %
PLATELET # BLD AUTO: 249 10E9/L (ref 150–450)
RBC # BLD AUTO: 4.92 10E12/L (ref 4.4–5.9)
TSH SERPL DL<=0.005 MIU/L-ACNC: 2.02 MU/L (ref 0.4–4)
WBC # BLD AUTO: 4.9 10E9/L (ref 4–11)

## 2019-08-30 PROCEDURE — 85025 COMPLETE CBC W/AUTO DIFF WBC: CPT | Performed by: NURSE PRACTITIONER

## 2019-08-30 PROCEDURE — 99395 PREV VISIT EST AGE 18-39: CPT | Performed by: NURSE PRACTITIONER

## 2019-08-30 PROCEDURE — 84443 ASSAY THYROID STIM HORMONE: CPT | Performed by: NURSE PRACTITIONER

## 2019-08-30 PROCEDURE — 36415 COLL VENOUS BLD VENIPUNCTURE: CPT | Performed by: NURSE PRACTITIONER

## 2019-08-30 RX ORDER — TRAZODONE HYDROCHLORIDE 50 MG/1
TABLET, FILM COATED ORAL
Qty: 60 TABLET | Refills: 5 | Status: SHIPPED | OUTPATIENT
Start: 2019-08-30 | End: 2019-11-27

## 2019-08-30 ASSESSMENT — ANXIETY QUESTIONNAIRES
7. FEELING AFRAID AS IF SOMETHING AWFUL MIGHT HAPPEN: MORE THAN HALF THE DAYS
5. BEING SO RESTLESS THAT IT IS HARD TO SIT STILL: SEVERAL DAYS
1. FEELING NERVOUS, ANXIOUS, OR ON EDGE: MORE THAN HALF THE DAYS
2. NOT BEING ABLE TO STOP OR CONTROL WORRYING: MORE THAN HALF THE DAYS
6. BECOMING EASILY ANNOYED OR IRRITABLE: SEVERAL DAYS
3. WORRYING TOO MUCH ABOUT DIFFERENT THINGS: MORE THAN HALF THE DAYS
IF YOU CHECKED OFF ANY PROBLEMS ON THIS QUESTIONNAIRE, HOW DIFFICULT HAVE THESE PROBLEMS MADE IT FOR YOU TO DO YOUR WORK, TAKE CARE OF THINGS AT HOME, OR GET ALONG WITH OTHER PEOPLE: VERY DIFFICULT
GAD7 TOTAL SCORE: 11

## 2019-08-30 ASSESSMENT — PATIENT HEALTH QUESTIONNAIRE - PHQ9
5. POOR APPETITE OR OVEREATING: SEVERAL DAYS
SUM OF ALL RESPONSES TO PHQ QUESTIONS 1-9: 13

## 2019-08-30 ASSESSMENT — PAIN SCALES - GENERAL: PAINLEVEL: NO PAIN (0)

## 2019-08-30 ASSESSMENT — MIFFLIN-ST. JEOR: SCORE: 1677.68

## 2019-08-30 NOTE — NURSING NOTE
"Chief Complaint   Patient presents with     Physical       Initial /72 (BP Location: Left arm, Patient Position: Sitting, Cuff Size: Adult Large)   Pulse 80   Temp 97.4  F (36.3  C) (Tympanic)   Resp 14   Ht 1.67 m (5' 5.75\")   Wt 74.4 kg (164 lb)   SpO2 97%   BMI 26.67 kg/m   Estimated body mass index is 26.67 kg/m  as calculated from the following:    Height as of this encounter: 1.67 m (5' 5.75\").    Weight as of this encounter: 74.4 kg (164 lb).  Medication Reconciliation: complete   Laura Marie LPN      "

## 2019-08-31 ASSESSMENT — ANXIETY QUESTIONNAIRES: GAD7 TOTAL SCORE: 11

## 2019-09-02 DIAGNOSIS — K12.0 RECURRENT CANKER SORES: ICD-10-CM

## 2019-09-03 RX ORDER — LIDOCAINE HYDROCHLORIDE 20 MG/ML
SOLUTION OROPHARYNGEAL
Qty: 100 ML | Refills: 0 | Status: SHIPPED | OUTPATIENT
Start: 2019-09-03 | End: 2020-01-20

## 2019-09-03 NOTE — TELEPHONE ENCOUNTER
Lidocaine      Last Written Prescription Date:  8/15/2019  Last Fill Quantity: 100 ml,   # refills: 0  Last Office Visit: 8/30/2019  Future Office visit:       Routing refill request to provider for review/approval because:  Drug not on the [facility], P or [facility] refill protocol or controlled substance

## 2019-11-22 NOTE — PROGRESS NOTES
"Subjective     Lamberto Poole is a 24 year old male who presents to clinic today for the following health issues:    HPI   Dizziness    Patient gets frequent ear infections followed by dizziness. Has been on and off for at least 6 months. Has appointments in January with audiology and then to ENT. Here to make sure he does not have another infection going on since he has been complaining of this issue again for a few days now. No C/O pain in ears, no fever, slight congestion/runny nose, no coughing, itching eyes. Also talked about getting allergy testing done.      Duration: on and off for 6 months following ear infections    Description   Feeling faint:  No \"I get dizzy and feel like I am spinning\". Denies positional changes although it is sometimes worse in the AM. Hears \"bubbles\" in his ear and occasionally heart beating.   Feeling like the surroundings are moving: no   Loss of consciousness or falls: no     Intensity:  moderate    Accompanying signs and symptoms:   Nausea/vomitting: no   Palpitations: no   Weakness in arms or legs: no   Vision or speech changes: no   Ringing in ears (Tinnitus): no   Hearing loss related to dizziness: no   Other (fevers/chills/sweating/dyspnea): no     History (similar episodes/head trauma/previous evaluation/recent bleeding): Ear infections and then dizziness    Precipitating or alleviating factors (new meds/chemicals): None  Worse with activity/head movement: YES    Therapies tried and outcome: None    Patient Active Problem List   Diagnosis     Chronic rhinitis     Anxiety     History of sinus tachycardia     ACP (advance care planning)     Primary insomnia     History reviewed. No pertinent surgical history.    Social History     Tobacco Use     Smoking status: Never Smoker     Smokeless tobacco: Former User   Substance Use Topics     Alcohol use: No     Family History   Problem Relation Age of Onset     Hypertension Father      Heart Murmur Father          Current Outpatient " Medications   Medication Sig Dispense Refill     diphenhydrAMINE (BENADRYL) 25 MG tablet Take 25 mg by mouth every 6 hours as needed for itching or allergies Takes 2 tabs every HS for sleep       ibuprofen (ADVIL/MOTRIN) 800 MG tablet Take 1 tablet (800 mg) by mouth every 8 hours as needed for moderate pain 90 tablet 1     lidocaine (XYLOCAINE) 2 % solution SWISH AND SPIT EVERY 3 TO 8 HOURS AS NEEDED; MAX OF 8 DOSES IN A 24 HOUR PERIOD 100 mL 0     loratadine (CLARITIN) 10 MG tablet Take 1 tablet (10 mg) by mouth daily 90 tablet 3     sertraline (ZOLOFT) 100 MG tablet Take 1 tablet (100 mg) by mouth daily 90 tablet 1     fluticasone (FLONASE) 50 MCG/ACT nasal spray Spray 2 sprays into both nostrils daily 50 mL 0     No Known Allergies  Recent Labs   Lab Test 08/30/19  0941 05/03/17  1031 06/03/14  1540   CR  --  0.91 1.01*   GFRESTIMATED  --  >90  Non  GFR Calc   >90   GFRESTBLACK  --  >90   GFR Calc   >90   POTASSIUM  --  4.0 3.8   TSH 2.02 1.68  --       BP Readings from Last 3 Encounters:   11/27/19 118/82   08/30/19 126/72   06/18/19 126/70    Wt Readings from Last 3 Encounters:   11/27/19 79.5 kg (175 lb 4.8 oz)   08/30/19 74.4 kg (164 lb)   06/18/19 79.1 kg (174 lb 6.4 oz)                      Reviewed and updated as needed this visit by Provider         Review of Systems   ROS COMP: Constitutional, HEENT, cardiovascular, pulmonary, gi and gu systems are negative, except as otherwise noted.      Objective    /82 (BP Location: Left arm, Patient Position: Sitting, Cuff Size: Adult Large)   Pulse 89   Temp 96  F (35.6  C) (Tympanic)   Wt 79.5 kg (175 lb 4.8 oz)   SpO2 98%   BMI 28.51 kg/m    Body mass index is 28.51 kg/m .  Physical Exam   GENERAL: healthy, alert and no distress  EYES: Eyes grossly normal to inspection, PERRL and conjunctivae and sclerae normal  HENT: normal cephalic/atraumatic, both ears: No fluid or erythema noted to TM or canal. Both TMs are opaque  with what appears to be scar tissue.  Nose and mouth without ulcers or lesions and oral mucous membranes moist. Clear postnasal drainage noted to oropharynx with mild erythema and no exudate or tonsil hypertrophy.   NECK: no adenopathy, no asymmetry, masses, or scars and thyroid normal to palpation  RESP: lungs clear to auscultation - no rales, rhonchi or wheezes  CV: regular rate and rhythm, normal S1 S2, no S3 or S4, no murmur, click or rub, no peripheral edema and peripheral pulses strong          Assessment & Plan     1. Chronic rhinitis  There is no evidence of ear infection.   - loratadine (CLARITIN) 10 MG tablet; Take 1 tablet (10 mg) by mouth daily  Dispense: 90 tablet; Refill: 3  Claritin 10 mg every day (script sent in)  Flonase 2 sprays to nostrils every day ( Over the counter)  Stop benadryl on daily basis and use melatonin over counter instead.     Follow up with audiology and ENT.      Return if symptoms worsen or fail to improve.    Roxanne Us, CNP  [facility]

## 2019-11-27 ENCOUNTER — OFFICE VISIT (OUTPATIENT)
Dept: FAMILY MEDICINE | Facility: OTHER | Age: 24
End: 2019-11-27
Attending: NURSE PRACTITIONER
Payer: COMMERCIAL

## 2019-11-27 VITALS
HEART RATE: 89 BPM | OXYGEN SATURATION: 98 % | WEIGHT: 175.3 LBS | BODY MASS INDEX: 28.51 KG/M2 | TEMPERATURE: 96 F | DIASTOLIC BLOOD PRESSURE: 82 MMHG | SYSTOLIC BLOOD PRESSURE: 118 MMHG

## 2019-11-27 DIAGNOSIS — J31.0 CHRONIC RHINITIS: Primary | ICD-10-CM

## 2019-11-27 PROCEDURE — 99213 OFFICE O/P EST LOW 20 MIN: CPT | Performed by: NURSE PRACTITIONER

## 2019-11-27 RX ORDER — LORATADINE 10 MG/1
10 TABLET ORAL DAILY
Qty: 90 TABLET | Refills: 3 | Status: SHIPPED | OUTPATIENT
Start: 2019-11-27 | End: 2020-11-21

## 2019-11-27 ASSESSMENT — PAIN SCALES - GENERAL: PAINLEVEL: NO PAIN (0)

## 2019-11-27 NOTE — PATIENT INSTRUCTIONS
Claritin 10 mg every day (script sent in)  Flonase 2 sprays to nostrils every day ( Over the counter)  Stop benadryl on daily basis and use melatonin over counter instead.   Follow up with audiology and ENT

## 2019-11-27 NOTE — NURSING NOTE
"Chief Complaint   Patient presents with     Ear Problem       Initial /82 (BP Location: Left arm, Patient Position: Sitting, Cuff Size: Adult Large)   Pulse 89   Temp 96  F (35.6  C) (Tympanic)   Wt 79.5 kg (175 lb 4.8 oz)   SpO2 98%   BMI 28.51 kg/m   Estimated body mass index is 28.51 kg/m  as calculated from the following:    Height as of 8/30/19: 1.67 m (5' 5.75\").    Weight as of this encounter: 79.5 kg (175 lb 4.8 oz).  Medication Reconciliation: complete  Ashley A. Lechevalier, LPN  "

## 2019-12-19 DIAGNOSIS — H81.10 BPPV (BENIGN PAROXYSMAL POSITIONAL VERTIGO), UNSPECIFIED LATERALITY: Primary | ICD-10-CM

## 2020-01-16 ENCOUNTER — TELEPHONE (OUTPATIENT)
Dept: FAMILY MEDICINE | Facility: OTHER | Age: 25
End: 2020-01-16

## 2020-01-16 NOTE — TELEPHONE ENCOUNTER
1:05 PM    Reason for Call: Phone Call    Description: Pt made appt for 01-17-19 with Roxanne Us to have STD testing. States his ex-girlfriend told him he has chlamydia. He would like the nurse to call him back today and let him know what kind of test it will be (urine, blood, etc.)    Was an appointment offered for this call? Yes  If yes : Appointment type              Date 01-17-19    Preferred method for responding to this message: Telephone Call  What is your phone number ? [phone]    If we cannot reach you directly, may we leave a detailed response at the number you provided? Yes    Can this message wait until your PCP/provider returns, if available today? Not applicable, provider is in today    Freda Richardson

## 2020-01-16 NOTE — PROGRESS NOTES
Subjective     Lamberto Poole is a 24 year old male who presents to clinic today for the following health issues:    HPI   STD testing requested    Description (location/character/radiation): Patient was notified from his ex girlfriend he may have chlamydia     Denies urethral discharge, itching, burning, difficulty voiding, and pelvic pain. He does report some cloudiness to his urine since 1-2 weeks ago.      Ear Discomfort  Hx of chronic ear infections. Has ENT appt on Monday 1/20/20. Has had feeling of fluid behind ear drum started over 6 months ago (this summer). He has tried sinus washes, Flonase, loratadine. He reports there has been an increased in thin ear wax over last few weeks. Denies pain, chills, fevers, facial pain. Reports he has not been able to hear well for many years due to shooting without ear protection.     Patient Active Problem List   Diagnosis     Chronic rhinitis     Anxiety     History of sinus tachycardia     ACP (advance care planning)     Primary insomnia     No past surgical history on file.    Social History     Tobacco Use     Smoking status: Never Smoker     Smokeless tobacco: Former User   Substance Use Topics     Alcohol use: No     Family History   Problem Relation Age of Onset     Hypertension Father      Heart Murmur Father          Current Outpatient Medications   Medication Sig Dispense Refill     fluticasone (FLONASE) 50 MCG/ACT nasal spray Spray 2 sprays into both nostrils daily 50 mL 0     loratadine (CLARITIN) 10 MG tablet Take 1 tablet (10 mg) by mouth daily 90 tablet 3     sertraline (ZOLOFT) 100 MG tablet TAKE 1 TABLET(100 MG) BY MOUTH DAILY 90 tablet 0     ibuprofen (ADVIL/MOTRIN) 800 MG tablet Take 1 tablet (800 mg) by mouth every 8 hours as needed for moderate pain (Patient not taking: Reported on 1/17/2020) 90 tablet 1     lidocaine (XYLOCAINE) 2 % solution SWISH AND SPIT EVERY 3 TO 8 HOURS AS NEEDED; MAX OF 8 DOSES IN A 24 HOUR PERIOD (Patient not taking: Reported  on 1/17/2020) 100 mL 0     No Known Allergies  Recent Labs   Lab Test 08/30/19  0941 05/03/17  1031 06/03/14  1540   CR  --  0.91 1.01*   GFRESTIMATED  --  >90  Non  GFR Calc   >90   GFRESTBLACK  --  >90   GFR Calc   >90   POTASSIUM  --  4.0 3.8   TSH 2.02 1.68  --       BP Readings from Last 3 Encounters:   01/17/20 116/72   11/27/19 118/82   08/30/19 126/72    Wt Readings from Last 3 Encounters:   01/17/20 78 kg (172 lb)   11/27/19 79.5 kg (175 lb 4.8 oz)   08/30/19 74.4 kg (164 lb)            Reviewed and updated as needed this visit by Provider         Review of Systems   ROS COMP: Constitutional, HEENT, cardiovascular, pulmonary, gi and gu systems are negative, except as otherwise noted.      Objective    There were no vitals taken for this visit.  There is no height or weight on file to calculate BMI.  Physical Exam   GENERAL: healthy, alert and no distress  HENT: ear canals are without erythema or edema.No fluid in canal. Bilateral TM: Unable to visualize entire TM of either ear. Appears that there is scar tissue or sloughed tissue on or in front of the TM. TM are not bulging, are not erythremic, and do not appear to have fluid behind them, however, this is difficult to fully visualize.   RESP: lungs clear to auscultation - no rales, rhonchi or wheezes  CV: regular rate and rhythm, normal S1 S2, no S3 or S4, no murmur, click or rub, no peripheral edema and peripheral pulses strong             Assessment & Plan   1. Exposure to sexually transmitted disease (STD)  Educated patient that he should hold off on any sexual activity until results are known and/or treatment completed if positive.   - GC/Chlamydia by PCR - [facility]    2. Chronic otitis media, unspecified otitis media type  Ears do not meet criteria for suppuritive otitis media. However, since I am not able to visualize the TM in it's entirety and patient report of thin (possibly earwax) fluid in the canal, I have opted to  start topical treatment.  - ciprofloxacin (CETRAXAL) 0.2 % otic solution; Place 0.25 mLs into both ears 2 times daily for 7 days  Dispense: 3.5 mL; Refill: 0    Update: Ciprofloxacin not covered by insurance. Ordered preferred medication per pharmacy: ofloxacin         Return if symptoms worsen or fail to improve.    Roxanne Us, CNP  [facility]

## 2020-01-17 ENCOUNTER — OFFICE VISIT (OUTPATIENT)
Dept: FAMILY MEDICINE | Facility: OTHER | Age: 25
End: 2020-01-17
Attending: NURSE PRACTITIONER
Payer: COMMERCIAL

## 2020-01-17 ENCOUNTER — TELEPHONE (OUTPATIENT)
Dept: FAMILY MEDICINE | Facility: OTHER | Age: 25
End: 2020-01-17

## 2020-01-17 VITALS
SYSTOLIC BLOOD PRESSURE: 116 MMHG | HEART RATE: 86 BPM | WEIGHT: 172 LBS | TEMPERATURE: 96.9 F | OXYGEN SATURATION: 98 % | RESPIRATION RATE: 14 BRPM | HEIGHT: 66 IN | DIASTOLIC BLOOD PRESSURE: 72 MMHG | BODY MASS INDEX: 27.64 KG/M2

## 2020-01-17 DIAGNOSIS — Z20.2 EXPOSURE TO SEXUALLY TRANSMITTED DISEASE (STD): Primary | ICD-10-CM

## 2020-01-17 DIAGNOSIS — H66.90 CHRONIC OTITIS MEDIA, UNSPECIFIED OTITIS MEDIA TYPE: ICD-10-CM

## 2020-01-17 LAB
C TRACH DNA SPEC QL PROBE+SIG AMP: NOT DETECTED
N GONORRHOEA DNA SPEC QL PROBE+SIG AMP: NOT DETECTED
SPECIMEN SOURCE: NORMAL

## 2020-01-17 PROCEDURE — 87491 CHLMYD TRACH DNA AMP PROBE: CPT | Performed by: NURSE PRACTITIONER

## 2020-01-17 PROCEDURE — 87591 N.GONORRHOEAE DNA AMP PROB: CPT | Performed by: NURSE PRACTITIONER

## 2020-01-17 PROCEDURE — 99213 OFFICE O/P EST LOW 20 MIN: CPT | Performed by: NURSE PRACTITIONER

## 2020-01-17 RX ORDER — OFLOXACIN 3 MG/ML
5 SOLUTION AURICULAR (OTIC) DAILY
Qty: 2 ML | Refills: 0 | Status: SHIPPED | OUTPATIENT
Start: 2020-01-17 | End: 2020-04-09

## 2020-01-17 RX ORDER — CIPROFLOXACIN 0.5 MG/.25ML
0.25 SOLUTION/ DROPS AURICULAR (OTIC) 2 TIMES DAILY
Qty: 3.5 ML | Refills: 0 | Status: SHIPPED | OUTPATIENT
Start: 2020-01-17 | End: 2020-01-17 | Stop reason: ALTCHOICE

## 2020-01-17 ASSESSMENT — PAIN SCALES - GENERAL: PAINLEVEL: NO PAIN (0)

## 2020-01-17 ASSESSMENT — MIFFLIN-ST. JEOR: SCORE: 1717.94

## 2020-01-17 NOTE — TELEPHONE ENCOUNTER
Call Peter JETER at [facility]   [phone]. Called at 1:31 on 1/17/20. Needs a replacement med for pt that is covered under pt insurance.

## 2020-01-17 NOTE — PATIENT INSTRUCTIONS
1. Exposure to sexually transmitted disease (STD)  - GC/Chlamydia by PCR - [facility]    2. Chronic otitis media, unspecified otitis media type  - ciprofloxacin (CETRAXAL) 0.2 % otic solution; Place 0.25 mLs into both ears 2 times daily for 7 days  Dispense: 3.5 mL; Refill: 0

## 2020-01-17 NOTE — TELEPHONE ENCOUNTER
Pharmacist called, they do not have cipro gtts in stock. Suggested alternatives are Ofloxacin or ciprodex. Please advise. Thank you!

## 2020-01-17 NOTE — NURSING NOTE
"Chief Complaint   Patient presents with     Personal       Initial /72 (BP Location: Right arm, Patient Position: Sitting, Cuff Size: Adult Regular)   Pulse 86   Temp 96.9  F (36.1  C) (Tympanic)   Resp 14   Ht 1.676 m (5' 6\")   Wt 78 kg (172 lb)   SpO2 98%   BMI 27.76 kg/m   Estimated body mass index is 27.76 kg/m  as calculated from the following:    Height as of this encounter: 1.676 m (5' 6\").    Weight as of this encounter: 78 kg (172 lb).  Medication Reconciliation: complete  Laura Marie LPN    "

## 2020-01-19 NOTE — PROGRESS NOTES
Otolaryngology Consultation    Patient: Lamberto Poole  : 1995    Patient presents with:  Consult: Vertigo, Fluid in Ears; Self Referral      HPI:  Lamberto Poole is a 24 year old male seen today for a sensation of fluid in his ears and vertigo for 1 year    He denies rotatory vertigo, more of chronic off balance present most days.  Worse with movement  History of migraines with rare headaches now, maternal hx of migraines  With imbalance he notes change in vision and some aural fullness    No flux HL  No complaints of HL  Bilateral tinnitus not overly bothersome    No otologic hx of chronic otitis media with effusion     Uses q-tips regularly  Occasional noise exposure in the past with hunting no regular occupational or recreational noise exposure    He denies chronic congestion or chronic sinusitis  He has seasonal allergic rhinitis, controlled with claritin, does not seem like his ears worsen seasonally    Equivocal results on flonase and claritin       audiogram today's date reveals normal thresholds and type a tympanograms    He chews tobacco, 4 year use  No DDS visit in years, dental phobia       Current Outpatient Rx   Medication Sig Dispense Refill     loratadine (CLARITIN) 10 MG tablet Take 1 tablet (10 mg) by mouth daily 90 tablet 3     ofloxacin (FLOXIN) 0.3 % otic solution Place 5 drops into both ears daily for 7 days 2 mL 0     sertraline (ZOLOFT) 100 MG tablet TAKE 1 TABLET(100 MG) BY MOUTH DAILY 90 tablet 0     fluticasone (FLONASE) 50 MCG/ACT nasal spray Spray 2 sprays into both nostrils daily (Patient not taking: Reported on 2020) 50 mL 0     ibuprofen (ADVIL/MOTRIN) 800 MG tablet Take 1 tablet (800 mg) by mouth every 8 hours as needed for moderate pain (Patient not taking: Reported on 2020) 90 tablet 1       Allergies: Patient has no known allergies.     Past Medical History:   Diagnosis Date     Chronic rhinitis 2014     History of sinus tachycardia 2016     Primary  "insomnia 8/30/2019       History reviewed. No pertinent surgical history.    ENT family history reviewed    Social History     Tobacco Use     Smoking status: Never Smoker     Smokeless tobacco: Current User     Tobacco comment: pt denied QP 1/20/2020   Substance Use Topics     Alcohol use: No     Drug use: No       Review of Systems  ROS: 10 point ROS neg other than the symptoms noted above in the HPI and eye floaters, eye irritation    Physical Exam  /74   Pulse 98   Temp 97.4  F (36.3  C) (Tympanic)   Ht 1.676 m (5' 6\")   Wt 78 kg (172 lb)   SpO2 98%   BMI 27.76 kg/m    General - The patient is well nourished and well developed, and appears to have good nutritional status.  Alert and oriented to person and place, answers questions and cooperates with examination appropriately.   Head and Face - Normocephalic and atraumatic, with no gross asymmetry noted.  The facial nerve is intact, with strong symmetric movements.  Voice and Breathing - The patient was breathing comfortably without the use of accessory muscles. There was no wheezing, stridor, or stertor.  The patients voice was clear and strong, and had appropriate pitch and quality.  No rica peripheral digital clubbing or cyanosis   Ears -examined under microscopy bilaterally  There is a pseudo-TM of. dried cerumen overlying both tympanic membranes.  This was moistened with saline loosened with suction and a pick and removed with an alligator  He did become somewhat vasovagal he tolerated this well  The external auditory canals are patent, the tympanic membranes are intact without effusion, retraction or mass.  Bony landmarks are intact.  Eyes - Extraocular movements intact, and the pupils were reactive to light.  Sclera were not icteric or injected, conjunctiva were pink and moist.  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  Bimanual palpation is negative for mass. " No mass or ulcerations noted.  The tongue was mobile and midline, and the dentition were in scattered condition.    Throat - The walls of the oropharynx were smooth, pink, moist, symmetric, and had no lesions or ulcerations.  The tonsillar pillars and soft palate were symmetric.  The uvula was midline on elevation.    Neck - No palpable enlarged fixed cervical lymph nodes.  No neck cysts or unusual tenderness to palpation.   No palpable fixed thyroid nodules or concerning goiter.  The trachea is grossly midline.   Nose - External contour is symmetric, no gross deflection or scars.  Nasal mucosa is pink and moist with no abnormal mucus.  The septum and turbinates were evaluated: grossly non obstructive.  No polyps, masses, or purulence noted on examination.    Procedure - Oral cavity lesion Biopsy  Informed consent was discussed and signed, and risks of the procedure were discussed, including bleeding, anesthesia, infection, scar formation, numbness, need for additional surgery, injury to salivary tissue.  I then infiltrated the mucosal tissues with 1% lidocaine with 1:200,000 epinephrine.  I then used a 15-blade to create an thin elliptical incision around the representative portion of the leukoplakia.  I then elevated the  ellipse and a thin cuff of underlying normal appearing mucosa with the scalpel.  I proceeded to use a fine iris scissor to undermine the lesion and excise it.   Hemostasis was achieved with direct pressure and  cautery.  The total length of the incision was 1.2 cm.  The specimen(s) was placed in formalin and sent to pathology.  The mucosal edges were then reapproximated using 4-0 chromic, simple interrupted sutures .  The patient tolerated the procedure without any difficulty.        Impression and Plan- Lamberto Poole is a 24 year old male with:    ICD-10-CM    1. 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 Tobacco abuse counseling Z71.6    3. Benign paroxysmal  positional vertigo, unspecified laterality vs vestibular migraine H81.10 PHYSICAL THERAPY REFERRAL   4. Bilateral impacted cerumen, resolved H61.23    5. Tinnitus, bilateral H93.13      HP reinforced  Reassured normal TM, normal hearing    Tobacco cessation was strongly encouraged.  The associated risk of head and neck cancer was discussed.  Every year of cessation offers health benefits. This was discussed with the patient today and they voiced understanding.  Quit tools and a nicotine patch were offered.      Finish the ear drops as prescribed  Start Vestibular Therapy  Use Vinegar Ear Wash as needed for itchy ears  Follow up with your dentist; take Valium as prescribed  Quit Tobacco  Follow up with Britney Devries in 2 months for repeat exam oral cavity and to ensure symptoms improving      This patient has had removal of an oral cavity lesion today.  I have instructed the patient on wound care with 1/2 strength peroxide rinses for 1 week, and  Diet.  Ibuprofen alternated with tylenol prn pain.  The patient will be called with pathology results.              Rosario Gr D.O.  Otolaryngology/Head and Neck Surgery  Allergy

## 2020-01-20 ENCOUNTER — OFFICE VISIT (OUTPATIENT)
Dept: OTOLARYNGOLOGY | Facility: OTHER | Age: 25
End: 2020-01-20
Attending: OTOLARYNGOLOGY
Payer: COMMERCIAL

## 2020-01-20 ENCOUNTER — OFFICE VISIT (OUTPATIENT)
Dept: AUDIOLOGY | Facility: OTHER | Age: 25
End: 2020-01-20
Attending: AUDIOLOGIST
Payer: COMMERCIAL

## 2020-01-20 VITALS
BODY MASS INDEX: 27.64 KG/M2 | WEIGHT: 172 LBS | HEART RATE: 98 BPM | DIASTOLIC BLOOD PRESSURE: 74 MMHG | SYSTOLIC BLOOD PRESSURE: 118 MMHG | OXYGEN SATURATION: 98 % | TEMPERATURE: 97.4 F | HEIGHT: 66 IN

## 2020-01-20 DIAGNOSIS — F41.9 ANXIETY DUE TO INVASIVE PROCEDURE: Primary | ICD-10-CM

## 2020-01-20 DIAGNOSIS — H93.13 TINNITUS, BILATERAL: ICD-10-CM

## 2020-01-20 DIAGNOSIS — Z71.6 TOBACCO ABUSE COUNSELING: ICD-10-CM

## 2020-01-20 DIAGNOSIS — K13.21 ORAL LEUKOPLAKIA: ICD-10-CM

## 2020-01-20 DIAGNOSIS — H81.10 BENIGN PAROXYSMAL POSITIONAL VERTIGO, UNSPECIFIED LATERALITY: ICD-10-CM

## 2020-01-20 DIAGNOSIS — R42 DIZZINESS: Primary | ICD-10-CM

## 2020-01-20 DIAGNOSIS — H61.23 BILATERAL IMPACTED CERUMEN: ICD-10-CM

## 2020-01-20 PROCEDURE — 99203 OFFICE O/P NEW LOW 30 MIN: CPT | Mod: 25 | Performed by: OTOLARYNGOLOGY

## 2020-01-20 PROCEDURE — 40808 BIOPSY OF MOUTH LESION: CPT | Performed by: OTOLARYNGOLOGY

## 2020-01-20 PROCEDURE — 92550 TYMPANOMETRY & REFLEX THRESH: CPT | Performed by: AUDIOLOGIST

## 2020-01-20 PROCEDURE — 92556 SPEECH AUDIOMETRY COMPLETE: CPT | Performed by: AUDIOLOGIST

## 2020-01-20 PROCEDURE — 88305 TISSUE EXAM BY PATHOLOGIST: CPT | Mod: TC | Performed by: OTOLARYNGOLOGY

## 2020-01-20 PROCEDURE — 92552 PURE TONE AUDIOMETRY AIR: CPT | Performed by: AUDIOLOGIST

## 2020-01-20 PROCEDURE — 92504 EAR MICROSCOPY EXAMINATION: CPT | Performed by: OTOLARYNGOLOGY

## 2020-01-20 RX ORDER — DIAZEPAM 10 MG
TABLET ORAL
Qty: 3 TABLET | Refills: 0 | Status: SHIPPED | OUTPATIENT
Start: 2020-01-20 | End: 2021-02-03

## 2020-01-20 ASSESSMENT — MIFFLIN-ST. JEOR: SCORE: 1717.94

## 2020-01-20 ASSESSMENT — PAIN SCALES - GENERAL: PAINLEVEL: NO PAIN (0)

## 2020-01-20 NOTE — PATIENT INSTRUCTIONS
Thank you for allowing Dr. Gr and our ENT team to participate in your care.  If your medications are too expensive, please give the nurse a call.  We can possibly change this medication.  If you have a scheduling or an appointment question please contact our Health Unit Coordinator at their direct line [phone].   ALL nursing questions or concerns can be directed to your ENT nurse at: [phone] - Sophia    Finish the ear drops as prescribed  Start Vestibular Therapy  Use Vinegar Ear Wash as needed for itchy ears  Follow up with your dentist; take Valium as prescribed  Quit Tobacco  Follow up with Britney Devries in 2 months    VINEGAR AND DISTILLED WATER IRRIGATION FOR EARS    This solution should only be used if the ears have noted drainage, or debris.      Using a sterile cup, mix 1/2 cup of white vinegar with 1/2 cup distilled water.    Irrigate the ear(s) using 15mL of solution every other day.    Completely dry the ear after irrigation, using a blow dryer on a low heat setting.       **If you are using ear drops, use the drops in the morning and the irrigation solution in the evening.

## 2020-01-20 NOTE — NURSING NOTE
"Chief Complaint   Patient presents with     Consult     Vertigo, Fluid in Ears; Self Referral       Initial /74   Pulse 98   Temp 97.4  F (36.3  C) (Tympanic)   Ht 1.676 m (5' 6\")   Wt 78 kg (172 lb)   SpO2 98%   BMI 27.76 kg/m   Estimated body mass index is 27.76 kg/m  as calculated from the following:    Height as of this encounter: 1.676 m (5' 6\").    Weight as of this encounter: 78 kg (172 lb).  Medication Reconciliation: complete  Jemima Harris LPN  "

## 2020-01-20 NOTE — LETTER
2020         RE: Lamberto Poole  [address] [place] [address]        Dear Colleague,    Thank you for referring your patient, Lamberto Poole, to the [facility]. Please see a copy of my visit note below.    Otolaryngology Consultation    Patient: Lamberto Poole  : 1995    Patient presents with:  Consult: Vertigo, Fluid in Ears; Self Referral      HPI:  Lamberto Poole is a 24 year old male seen today for a sensation of fluid in his ears and vertigo for 1 year    He denies rotatory vertigo, more of chronic off balance present most days.  Worse with movement  History of migraines with rare headaches now, maternal hx of migraines  With imbalance he notes change in vision and some aural fullness    No flux HL  No complaints of HL  Bilateral tinnitus not overly bothersome    No otologic hx of chronic otitis media with effusion     Uses q-tips regularly  Occasional noise exposure in the past with hunting no regular occupational or recreational noise exposure    He denies chronic congestion or chronic sinusitis  He has seasonal allergic rhinitis, controlled with claritin, does not seem like his ears worsen seasonally    Equivocal results on flonase and claritin       audiogram today's date reveals normal thresholds and type a tympanograms    He chews tobacco, 4 year use  No DDS visit in years, dental phobia       Current Outpatient Rx   Medication Sig Dispense Refill     loratadine (CLARITIN) 10 MG tablet Take 1 tablet (10 mg) by mouth daily 90 tablet 3     ofloxacin (FLOXIN) 0.3 % otic solution Place 5 drops into both ears daily for 7 days 2 mL 0     sertraline (ZOLOFT) 100 MG tablet TAKE 1 TABLET(100 MG) BY MOUTH DAILY 90 tablet 0     fluticasone (FLONASE) 50 MCG/ACT nasal spray Spray 2 sprays into both nostrils daily (Patient not taking: Reported on 2020) 50 mL 0     ibuprofen (ADVIL/MOTRIN) 800 MG tablet Take 1 tablet (800 mg) by mouth every 8 hours as needed for  "moderate pain (Patient not taking: Reported on 1/17/2020) 90 tablet 1       Allergies: Patient has no known allergies.     Past Medical History:   Diagnosis Date     Chronic rhinitis 5/5/2014     History of sinus tachycardia 2/11/2016     Primary insomnia 8/30/2019       History reviewed. No pertinent surgical history.    ENT family history reviewed    Social History     Tobacco Use     Smoking status: Never Smoker     Smokeless tobacco: Current User     Tobacco comment: pt denied QP 1/20/2020   Substance Use Topics     Alcohol use: No     Drug use: No       Review of Systems  ROS: 10 point ROS neg other than the symptoms noted above in the HPI and eye floaters, eye irritation    Physical Exam  /74   Pulse 98   Temp 97.4  F (36.3  C) (Tympanic)   Ht 1.676 m (5' 6\")   Wt 78 kg (172 lb)   SpO2 98%   BMI 27.76 kg/m     General - The patient is well nourished and well developed, and appears to have good nutritional status.  Alert and oriented to person and place, answers questions and cooperates with examination appropriately.   Head and Face - Normocephalic and atraumatic, with no gross asymmetry noted.  The facial nerve is intact, with strong symmetric movements.  Voice and Breathing - The patient was breathing comfortably without the use of accessory muscles. There was no wheezing, stridor, or stertor.  The patients voice was clear and strong, and had appropriate pitch and quality.  No rica peripheral digital clubbing or cyanosis   Ears -examined under microscopy bilaterally  There is a pseudo-TM of. dried cerumen overlying both tympanic membranes.  This was moistened with saline loosened with suction and a pick and removed with an alligator  He did become somewhat vasovagal he tolerated this well  The external auditory canals are patent, the tympanic membranes are intact without effusion, retraction or mass.  Bony landmarks are intact.  Eyes - Extraocular movements intact, and the pupils were reactive " to light.  Sclera were not icteric or injected, conjunctiva were pink and moist.  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  Bimanual palpation is negative for mass. No mass or ulcerations noted.  The tongue was mobile and midline, and the dentition were in scattered condition.    Throat - The walls of the oropharynx were smooth, pink, moist, symmetric, and had no lesions or ulcerations.  The tonsillar pillars and soft palate were symmetric.  The uvula was midline on elevation.    Neck - No palpable enlarged fixed cervical lymph nodes.  No neck cysts or unusual tenderness to palpation.   No palpable fixed thyroid nodules or concerning goiter.  The trachea is grossly midline.   Nose - External contour is symmetric, no gross deflection or scars.  Nasal mucosa is pink and moist with no abnormal mucus.  The septum and turbinates were evaluated: grossly non obstructive.  No polyps, masses, or purulence noted on examination.    Procedure - Oral cavity lesion Biopsy  Informed consent was discussed and signed, and risks of the procedure were discussed, including bleeding, anesthesia, infection, scar formation, numbness, need for additional surgery, injury to salivary tissue.  I then infiltrated the mucosal tissues with 1% lidocaine with 1:200,000 epinephrine.  I then used a 15-blade to create an thin elliptical incision around the representative portion of the leukoplakia.  I then elevated the  ellipse and a thin cuff of underlying normal appearing mucosa with the scalpel.  I proceeded to use a fine iris scissor to undermine the lesion and excise it.   Hemostasis was achieved with direct pressure and  cautery.  The total length of the incision was 1.2 cm.  The specimen(s) was placed in formalin and sent to pathology.  The mucosal edges were then reapproximated using 4-0 chromic, simple interrupted sutures .  The patient tolerated the  procedure without any difficulty.        Impression and Plan- Lamberto REJI Poole is a 24 year old male with:    ICD-10-CM    1. 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 Tobacco abuse counseling Z71.6    3. Benign paroxysmal positional vertigo, unspecified laterality vs vestibular migraine H81.10 PHYSICAL THERAPY REFERRAL   4. Bilateral impacted cerumen, resolved H61.23    5. Tinnitus, bilateral H93.13      HP reinforced  Reassured normal TM, normal hearing    Tobacco cessation was strongly encouraged.  The associated risk of head and neck cancer was discussed.  Every year of cessation offers health benefits. This was discussed with the patient today and they voiced understanding.  Quit tools and a nicotine patch were offered.      Finish the ear drops as prescribed  Start Vestibular Therapy  Use Vinegar Ear Wash as needed for itchy ears  Follow up with your dentist; take Valium as prescribed  Quit Tobacco  Follow up with Britney Devries in 2 months for repeat exam oral cavity and to ensure symptoms improving      This patient has had removal of an oral cavity lesion today.  I have instructed the patient on wound care with 1/2 strength peroxide rinses for 1 week, and  Diet.  Ibuprofen alternated with tylenol prn pain.  The patient will be called with pathology results.              Rosario Gr D.O.  Otolaryngology/Head and Neck Surgery  Allergy      Again, thank you for allowing me to participate in the care of your patient.        Sincerely,        Rosario Gr MD

## 2020-01-20 NOTE — PROGRESS NOTES
Audiology Evaluation Completed. Please refer SCANNED AUDIOGRAM and/or TYMPANOGRAM for BACKGROUND, RESULTS, RECOMMENDATIONS.      Jimena KING, [facility]  Audiologist #[id number]

## 2020-01-22 LAB — COPATH REPORT: NORMAL

## 2020-02-07 DIAGNOSIS — F41.9 ANXIETY: ICD-10-CM

## 2020-02-10 RX ORDER — SERTRALINE HYDROCHLORIDE 100 MG/1
TABLET, FILM COATED ORAL
Qty: 90 TABLET | Refills: 0 | Status: SHIPPED | OUTPATIENT
Start: 2020-02-10 | End: 2020-02-10

## 2020-04-08 NOTE — PROGRESS NOTES
"Lamberto Poole is a 24 year old male who is being evaluated via a billable telephone visit.      The patient has been notified of following:     \"This telephone visit will be conducted via a call between you and your physician/provider. We have found that certain health care needs can be provided without the need for a physical exam.  This service lets us provide the care you need with a short phone conversation.  If a prescription is necessary we can send it directly to your pharmacy.  If lab work is needed we can place an order for that and you can then stop by our lab to have the test done at a later time.    Telephone visits are billed at different rates depending on your insurance coverage. During this emergency period, for some insurers they may be billed the same as an in-person visit.  Please reach out to your insurance provider with any questions.    If during the course of the call the physician/provider feels a telephone visit is not appropriate, you will not be charged for this service.\"    Patient has given verbal consent for Telephone visit?  {YES-NO  Default Yes:4444::\"Yes\"}    How would you like to obtain your AVS? {AVS Preference:[id number]}    Lamberto Poole complains of    Chief Complaint   Patient presents with     Mouth Lesions     Pt is a f/u oral cavity lesion biopsy on 1/20/20 in office with Dr. Gr.     He denies rotatory vertigo, more of chronic off balance present most days.  Worse with movement  History of migraines with rare headaches now, maternal hx of migraines  With imbalance he notes change in vision and some aural fullness     No flux HL  No complaints of HL  Bilateral tinnitus not overly bothersome     No otologic hx of chronic otitis media with effusion      Uses q-tips regularly  Occasional noise exposure in the past with hunting no regular occupational or recreational noise exposure      Last recommended plan:Finish the ear drops as prescribed  Start Vestibular Therapy  Use " Vinegar Ear Wash as needed for itchy ears  Follow up with your dentist; take Valium as prescribed  Quit Tobacco  Follow up with Britney Devries in 2 months for repeat exam oral cavity and to ensure symptoms improving          Past Medical History:   Diagnosis Date     Chronic rhinitis 5/5/2014     History of sinus tachycardia 2/11/2016     Primary insomnia 8/30/2019      No Known Allergies  Current Outpatient Medications   Medication     diazepam (VALIUM) 10 MG tablet     fluticasone (FLONASE) 50 MCG/ACT nasal spray     ibuprofen (ADVIL/MOTRIN) 800 MG tablet     loratadine (CLARITIN) 10 MG tablet     sertraline (ZOLOFT) 100 MG tablet     No current facility-administered medications for this visit.        I have reviewed and updated the patient's Past Medical History, Social History, Family History and Medication List.    ALLERGIES  Patient has no known allergies.    Additional provider notes: {use your own note template here:374507} ***      ASSESSMENT:      Follow up in ENT for oral exam in 2 months.       Phone call duration: *** minutes      Britney Devries PA-C  ENT  [facility]  [phone]

## 2020-04-09 ENCOUNTER — VIRTUAL VISIT (OUTPATIENT)
Dept: OTOLARYNGOLOGY | Facility: OTHER | Age: 25
End: 2020-04-09
Attending: OTOLARYNGOLOGY
Payer: COMMERCIAL

## 2020-04-09 VITALS — WEIGHT: 175 LBS | BODY MASS INDEX: 28.12 KG/M2 | HEIGHT: 66 IN

## 2020-04-09 DIAGNOSIS — Z71.6 TOBACCO ABUSE COUNSELING: Primary | ICD-10-CM

## 2020-04-09 DIAGNOSIS — H81.10 BENIGN PAROXYSMAL POSITIONAL VERTIGO, UNSPECIFIED LATERALITY: ICD-10-CM

## 2020-04-09 DIAGNOSIS — K13.70 ORAL MUCOSAL LESION: ICD-10-CM

## 2020-04-09 PROBLEM — R42 DIZZINESS: Status: ACTIVE | Noted: 2020-01-29

## 2020-04-09 PROCEDURE — 99212 OFFICE O/P EST SF 10 MIN: CPT | Mod: TEL | Performed by: PHYSICIAN ASSISTANT

## 2020-04-09 ASSESSMENT — MIFFLIN-ST. JEOR: SCORE: 1731.54

## 2020-04-09 ASSESSMENT — PAIN SCALES - GENERAL: PAINLEVEL: NO PAIN (0)

## 2020-04-09 NOTE — PATIENT INSTRUCTIONS
Reviewed pathology report from oral biopsy- Benign.   Quit tobacco. Quit chewing tobacco- using oral tobacco will place you at a higher risk for cancer.   Establish w/ Dentist.     Follow up in 4-6 months in [facility] for oral examination.     Hearing protection.   Use Vinegar rinse solution as needed for ear issues.   If dizziness or new symptoms develop- return to ENT sooner.       Thank you for allowing Britney Devries PA-C and our ENT team to participate in your care.  If your medications are too expensive, please give the nurse a call.  We can possibly change this medication.  If you have a scheduling or an appointment question please contact our Health Unit Coordinator at their direct line [phone].   ALL nursing questions or concerns can be directed to your ENT nurse at: [phone] Kath  Follow up with Daisy Espinoza in [facility] for an oral check.  VINEGAR AND DISTILLED WATER IRRIGATION FOR EARS    This solution should only be used if the ears have noted drainage, or debris.      Using a sterile cup, mix 1/2 cup of white vinegar with 1/2 cup distilled water.    Irrigate the ear(s) using 15mL of solution every other day.    Completely dry the ear after irrigation, using a blow dryer on a low heat setting.       **If you are using ear drops, use the drops in the morning and the irrigation solution in the evening.

## 2020-04-09 NOTE — PROGRESS NOTES
"Lamberto Poole is a 24 year old male who is being evaluated via a billable telephone visit.      The patient has been notified of following:     \"This telephone visit will be conducted via a call between you and your physician/provider. We have found that certain health care needs can be provided without the need for a physical exam.  This service lets us provide the care you need with a short phone conversation.  If a prescription is necessary we can send it directly to your pharmacy.  If lab work is needed we can place an order for that and you can then stop by our lab to have the test done at a later time.    Telephone visits are billed at different rates depending on your insurance coverage. During this emergency period, for some insurers they may be billed the same as an in-person visit.  Please reach out to your insurance provider with any questions.    If during the course of the call the physician/provider feels a telephone visit is not appropriate, you will not be charged for this service.\"    Patient has given verbal consent for Telephone visit?  Yes    How would you like to obtain your AVS? Mail a copy    Lamberto Poole complains of    Chief Complaint   Patient presents with     Mouth Lesions     Pt is a f/u oral cavity lesion biopsy on 1/20/20 in office with Dr. Gr.     He denies rotatory vertigo, more of chronic off balance present most days.  Worse with movement  History of migraines with rare headaches now, maternal hx of migraines  Ear exam was normal at his last visit, audiogram was normal.   He did complete 2 sessions of PT for vestibular therapy with good results.   He has felt the dizziness has been improving. He has no concerns at this time.   He had left ear itching feels like there is wax in there. No otorrhea.   He has not used the vinegar rinse.     He was encouraged to use Vinegar rinse PRN and quit tobacco.        No flux HL  No complaints of HL  Bilateral tinnitus not overly " bothersome  No otologic hx of chronic otitis media with effusion        He did have an oral lesion biopsied at his last visit. He does have no rica oral lesions present. He feels like the lesions have resolved at this time.   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 He was informed due to changes in oral mucosa related to chronic tobacco use puts him at a higher risk or oral caner.     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  Bimanual palpation is negative for mass. No mass or ulcerations noted.  The tongue was mobile and midline, and the dentition were in scattered condition.      Pathology- 1/20/20  Right buccal mucosa, biopsy   - Mild hyperkeratosis   Past Medical History:   Diagnosis Date     Chronic rhinitis 5/5/2014     History of sinus tachycardia 2/11/2016     Primary insomnia 8/30/2019      No Known Allergies  Current Outpatient Medications   Medication     diazepam (VALIUM) 10 MG tablet     fluticasone (FLONASE) 50 MCG/ACT nasal spray     ibuprofen (ADVIL/MOTRIN) 800 MG tablet     loratadine (CLARITIN) 10 MG tablet     sertraline (ZOLOFT) 100 MG tablet     No current facility-administered medications for this visit.        I have reviewed and updated the patient's Past Medical History, Social History, Family History and Medication List.    ALLERGIES  Patient has no known allergies.    Additional provider notes:     Reviewed pathology and Office notes.       ASSESSMENT:    ICD-10-CM    1. Tobacco abuse counseling  Z71.6    2. Benign paroxysmal positional vertigo, unspecified laterality vs vestibular migraine. Improved  H81.10    3. Oral mucosal lesion  K13.70      Reviewed pathology report from oral biopsy- Benign.   Quit tobacco. Quit chewing tobacco- using oral tobacco will place you at a higher risk  for cancer.     Discussed pathology report, he does have benign report. However, he does have hx of tobacco use, chewing tobacco and would recommend cessation as it will reduce risk/ exposure throughout his life.   Follow up w/ DDS.     Follow up in 4-6 months in [place] [facility] for oral examination.     Hearing protection.   Use Vinegar rinse solution as needed for ear issues.   If dizziness or new symptoms develop- return to ENT sooner.         Phone call duration:7  minutes      Britney Devries PA-C  ENT  [facility]  [phone]

## 2020-12-20 ENCOUNTER — HEALTH MAINTENANCE LETTER (OUTPATIENT)
Age: 25
End: 2020-12-20

## 2021-02-03 ENCOUNTER — VIRTUAL VISIT (OUTPATIENT)
Dept: FAMILY MEDICINE | Facility: OTHER | Age: 26
End: 2021-02-03
Attending: NURSE PRACTITIONER
Payer: COMMERCIAL

## 2021-02-03 DIAGNOSIS — F41.9 ANXIETY: ICD-10-CM

## 2021-02-03 PROCEDURE — 99213 OFFICE O/P EST LOW 20 MIN: CPT | Mod: 95 | Performed by: NURSE PRACTITIONER

## 2021-02-03 RX ORDER — SERTRALINE HYDROCHLORIDE 100 MG/1
TABLET, FILM COATED ORAL
Qty: 135 TABLET | Refills: 1 | Status: SHIPPED | OUTPATIENT
Start: 2021-02-03 | End: 2021-07-30

## 2021-02-03 ASSESSMENT — ANXIETY QUESTIONNAIRES
4. TROUBLE RELAXING: NOT AT ALL
1. FEELING NERVOUS, ANXIOUS, OR ON EDGE: SEVERAL DAYS
5. BEING SO RESTLESS THAT IT IS HARD TO SIT STILL: NOT AT ALL
2. NOT BEING ABLE TO STOP OR CONTROL WORRYING: SEVERAL DAYS
6. BECOMING EASILY ANNOYED OR IRRITABLE: SEVERAL DAYS
IF YOU CHECKED OFF ANY PROBLEMS ON THIS QUESTIONNAIRE, HOW DIFFICULT HAVE THESE PROBLEMS MADE IT FOR YOU TO DO YOUR WORK, TAKE CARE OF THINGS AT HOME, OR GET ALONG WITH OTHER PEOPLE: SOMEWHAT DIFFICULT
7. FEELING AFRAID AS IF SOMETHING AWFUL MIGHT HAPPEN: SEVERAL DAYS
GAD7 TOTAL SCORE: 5
3. WORRYING TOO MUCH ABOUT DIFFERENT THINGS: SEVERAL DAYS

## 2021-02-03 ASSESSMENT — PATIENT HEALTH QUESTIONNAIRE - PHQ9: SUM OF ALL RESPONSES TO PHQ QUESTIONS 1-9: 5

## 2021-02-03 NOTE — PATIENT INSTRUCTIONS
1. Anxiety  - sertraline (ZOLOFT) 100 MG tablet; TAKE 1 AND 1/2 TABLETS(150 MG) BY MOUTH DAILY  Dispense: 135 tablet; Refill: 1    Follow-up 6 months    [facility]St[facility]  [phone]

## 2021-02-03 NOTE — LETTER
My Depression Action Plan  Name: Lamberto Poole   Date of Birth 1995  Date: 2/3/2021    My doctor: Althea Barragan   My clinic: [facility]  [address]  [place] [address]  [phone]          GREEN    ZONE   Good Control    What it looks like:     Things are going generally well. You have normal ups and downs. You may even feel depressed from time to time, but bad moods usually last less than a day.   What you need to do:  1. Continue to care for yourself (see self care plan)  2. Check your depression survival kit and update it as needed  3. Follow your physician s recommendations including any medication.  4. Do not stop taking medication unless you consult with your physician first.           YELLOW         ZONE Getting Worse    What it looks like:     Depression is starting to interfere with your life.     It may be hard to get out of bed; you may be starting to isolate yourself from others.    Symptoms of depression are starting to last most all day and this has happened for several days.     You may have suicidal thoughts but they are not constant.   What you need to do:     1. Call your care team. Your response to treatment will improve if you keep your care team informed of your progress. Yellow periods are signs an adjustment may need to be made.     2. Continue your self-care.  Just get dressed and ready for the day.  Don't give yourself time to talk yourself out of it.    3. Talk to someone in your support network.    4. Open up your Depression Self-Care Plan/Wellness Kit.           RED    ZONE Medical Alert - Get Help    What it looks like:     Depression is seriously interfering with your life.     You may experience these or other symptoms: You can t get out of bed most days, can t work or engage in other necessary activities, you have trouble taking care of basic hygiene, or basic responsibilities, thoughts of suicide or death that will not go  away, self-injurious behavior.     What you need to do:  1. Call your care team and request a same-day appointment. If they are not available (weekends or after hours) call your local crisis line, emergency room or 911.          Depression Self-Care Plan / Wellness Kit    Many people find that medication and therapy are helpful treatments for managing depression. In addition, making small changes to your everyday life can help to boost your mood and improve your wellbeing. Below are some tips for you to consider. Be sure to talk with your medical provider and/or behavioral health consultant if your symptoms are worsening or not improving.     Sleep   Sleep hygiene  means all of the habits that support good, restful sleep. It includes maintaining a consistent bedtime and wake time, using your bedroom only for sleeping or sex, and keeping the bedroom dark and free of distractions like a computer, smartphone, or television.     Develop a Healthy Routine  Maintain good hygiene. Get out of bed in the morning, make your bed, brush your teeth, take a shower, and get dressed. Don t spend too much time viewing media that makes you feel stressed. Find time to relax each day.    Exercise  Get some form of exercise every day. This will help reduce pain and release endorphins, the  feel good  chemicals in your brain. It can be as simple as just going for a walk or doing some gardening, anything that will get you moving.      Diet  Strive to eat healthy foods, including fruits and vegetables. Drink plenty of water. Avoid excessive sugar, caffeine, alcohol, and other mood-altering substances.     Stay Connected with Others  Stay in touch with friends and family members.    Manage Your Mood  Try deep breathing, massage therapy, biofeedback, or meditation. Take part in fun activities when you can. Try to find something to smile about each day.     Psychotherapy  Be open to working with a therapist if your provider recommends it.      Medication  Be sure to take your medication as prescribed. Most anti-depressants need to be taken every day. It usually takes several weeks for medications to work. Not all medicines work for all people. It is important to follow-up with your provider to make sure you have a treatment plan that is working for you. Do not stop your medication abruptly without first discussing it with your provider.    Crisis Resources   These hotlines are for both adults and children. They and are open 24 hours a day, 7 days a week unless noted otherwise.      National Suicide Prevention Lifeline   7-162-127-TALK (9565)      Crisis Text Line    www.crisistextline.org  Text HOME to 563331 from anywhere in the [place], anytime, about any type of crisis. A live, trained crisis counselor will receive the text and respond quickly.      [place] Lifeline for LGBTQ Youth  A national crisis intervention and suicide lifeline for LGBTQ youth under 25. Provides a safe place to talk without judgement. Call 1-618.965.4727; text START to 912853 or visit www.thetrevorproject.org to talk to a trained counselor.      For [facility] crisis numbers, visit the [facility] website at:  https://[place].gov/dhs/people-we-serve/adults/health-care/mental-health/resources/crisis-contacts.jsp

## 2021-02-03 NOTE — PROGRESS NOTES
Lamberto is a 25 year old who is being evaluated via a billable telephone visit.      What phone number would you like to be contacted at? [phone]  How would you like to obtain your AVS? Anali Orantes is a 25 year old who presents to clinic today for the following health issues       Anxiety Follow-Up    How are you doing with your depression since your last visit? Improved -medication helping     How are you doing with your anxiety since your last visit?  Improved -medication seems to help    Are you having other symptoms that might be associated with depression or anxiety? No    Have you had a significant life event? No     Do you have any concerns with your use of alcohol or other drugs? No      Social History     Tobacco Use     Smoking status: Never Smoker     Smokeless tobacco: Current User     Tobacco comment: pt denied QP 1/20/2020   Substance Use Topics     Alcohol use: No     Drug use: No     PHQ 7/20/2019 8/30/2019 2/3/2021   PHQ-9 Total Score 15 13 5   Q9: Thoughts of better off dead/self-harm past 2 weeks Not at all Not at all Not at all     AZRA-7 SCORE 7/20/2019 8/30/2019 2/3/2021   Total Score 17 (severe anxiety) - -   Total Score 17 11 5       Last PHQ-9 2/3/2021   1.  Little interest or pleasure in doing things 1   2.  Feeling down, depressed, or hopeless 0   3.  Trouble falling or staying asleep, or sleeping too much 3   4.  Feeling tired or having little energy 0   5.  Poor appetite or overeating 0   6.  Feeling bad about yourself 0   7.  Trouble concentrating 0   8.  Moving slowly or restless 1   Q9: Thoughts of better off dead/self-harm past 2 weeks 0   PHQ-9 Total Score 5   Difficulty at work, home, or with people Not difficult at all       AZRA-7  2/3/2021   1. Feeling nervous, anxious, or on edge 1   2. Not being able to stop or control worrying 1   3. Worrying too much about different things 1   4. Trouble relaxing 0   5. Being so restless that it is hard to sit still 0   6. Becoming  easily annoyed or irritable 1   7. Feeling afraid, as if something awful might happen 1   AZRA-7 Total Score 5   If you checked any problems, how difficult have they made it for you to do your work, take care of things at home, or get along with other people? Somewhat difficult       Suicide Assessment Five-step Evaluation and Treatment (SAFE-T)      How many servings of fruits and vegetables do you eat daily?  0-1    On average, how many sweetened beverages do you drink each day (Examples: soda, juice, sweet tea, etc.  Do NOT count diet or artificially sweetened beverages)?   1    How many days per week do you exercise enough to make your heart beat faster? 7    How many minutes a day do you exercise enough to make your heart beat faster? 30 - 60    How many days per week do you miss taking your medication? 0      Review of Systems   Constitutional, HEENT, cardiovascular, pulmonary, gi and gu systems are negative, except as otherwise noted.          Objective    Vitals - Patient Reported  Weight (Patient Reported): 85.3 kg (188 lb)    Physical Exam   healthy, alert and no distress  PSYCH: Alert and oriented times 3; coherent speech, normal   rate and volume, able to articulate logical thoughts, able   to abstract reason, no tangential thoughts, no hallucinations   or delusions  His affect is normal  RESP: No cough, no audible wheezing, able to talk in full sentences  Remainder of exam unable to be completed due to telephone visits           Phone call duration: 12 minutes      1. Anxiety  - sertraline (ZOLOFT) 100 MG tablet; TAKE 1 AND 1/2 TABLETS(150 MG) BY MOUTH DAILY  Dispense: 135 tablet; Refill: 1    Follow-up 6 months    [facility]LEY[facility]  [phone]

## 2021-02-04 ASSESSMENT — ANXIETY QUESTIONNAIRES: GAD7 TOTAL SCORE: 5

## 2021-03-10 ENCOUNTER — TELEPHONE (OUTPATIENT)
Dept: FAMILY MEDICINE | Facility: OTHER | Age: 26
End: 2021-03-10

## 2021-03-10 NOTE — TELEPHONE ENCOUNTER
12:13 PM    Reason for Call: Phone Call    Description: pt would like to get worked in for an ear cleaning this Fri 03/12/21/ please call pt    Was an appointment offered for this call? No  If yes : Appointment type              Date    Preferred method for responding to this message: Telephone Call  What is your phone number ? [phone]    If we cannot reach you directly, may we leave a detailed response at the number you provided? Yes    Can this message wait until your PCP/provider returns, if available today? YES, No, provider is in    Moni John

## 2021-03-12 ENCOUNTER — OFFICE VISIT (OUTPATIENT)
Dept: FAMILY MEDICINE | Facility: OTHER | Age: 26
End: 2021-03-12
Attending: NURSE PRACTITIONER
Payer: COMMERCIAL

## 2021-03-12 VITALS
HEIGHT: 66 IN | BODY MASS INDEX: 28.93 KG/M2 | HEART RATE: 80 BPM | DIASTOLIC BLOOD PRESSURE: 78 MMHG | SYSTOLIC BLOOD PRESSURE: 126 MMHG | WEIGHT: 180 LBS | OXYGEN SATURATION: 98 % | TEMPERATURE: 97.2 F

## 2021-03-12 DIAGNOSIS — H81.11 BENIGN PAROXYSMAL POSITIONAL VERTIGO OF RIGHT EAR: Primary | ICD-10-CM

## 2021-03-12 PROCEDURE — 99214 OFFICE O/P EST MOD 30 MIN: CPT | Performed by: NURSE PRACTITIONER

## 2021-03-12 RX ORDER — MECLIZINE HYDROCHLORIDE 25 MG/1
25 TABLET ORAL 3 TIMES DAILY PRN
Qty: 60 TABLET | Refills: 1 | Status: SHIPPED | OUTPATIENT
Start: 2021-03-12 | End: 2022-09-20

## 2021-03-12 ASSESSMENT — PAIN SCALES - GENERAL: PAINLEVEL: NO PAIN (0)

## 2021-03-12 ASSESSMENT — MIFFLIN-ST. JEOR: SCORE: 1749.22

## 2021-03-12 NOTE — NURSING NOTE
"Chief Complaint   Patient presents with     Ear Problem       Initial /78   Pulse 80   Temp 97.2  F (36.2  C) (Tympanic)   Ht 1.676 m (5' 6\")   Wt 81.6 kg (180 lb)   SpO2 98%   BMI 29.05 kg/m   Estimated body mass index is 29.05 kg/m  as calculated from the following:    Height as of this encounter: 1.676 m (5' 6\").    Weight as of this encounter: 81.6 kg (180 lb).  Medication Reconciliation: complete  Lindsay Bell LPN  "

## 2021-03-12 NOTE — PROGRESS NOTES
Assessment & Plan     Benign paroxysmal positional vertigo of right ear  - Epley exercises  - meclizine (ANTIVERT) 25 MG tablet; Take 1 tablet (25 mg) by mouth 3 times daily as needed for dizziness    Debrox OTC for ear wax        Althea Barragan CNP  [facility] - TIEN Orantes is a 25 year old who presents for the following health issues       Concern - ear problem  Onset: 1 week  Description: feeling like ears are plugged, vertigo  Intensity: moderate  Progression of Symptoms:  worsening  Accompanying Signs & Symptoms: see above  Previous history of similar problem: Hx of vertigo and plugged ears  Precipitating factors:        Worsened by:   Alleviating factors:        Improved by:   Therapies tried and outcome:  none       Ear wash completed, small amount of wax removed, canals clear      Patient Active Problem List   Diagnosis     Chronic rhinitis     Anxiety     History of sinus tachycardia     ACP (advance care planning)     Primary insomnia     Dizziness     No past surgical history on file.    Social History     Tobacco Use     Smoking status: Never Smoker     Smokeless tobacco: Current User     Tobacco comment: pt denied QP 1/20/2020   Substance Use Topics     Alcohol use: No     Family History   Problem Relation Age of Onset     Hypertension Father      Heart Murmur Father            Current Outpatient Medications   Medication Sig Dispense Refill     ibuprofen (ADVIL/MOTRIN) 800 MG tablet Take 1 tablet (800 mg) by mouth every 8 hours as needed for moderate pain 90 tablet 1     sertraline (ZOLOFT) 100 MG tablet TAKE 1 AND 1/2 TABLETS(150 MG) BY MOUTH DAILY 135 tablet 1       No Known Allergies       Recent Labs   Lab Test 08/30/19  0941 05/03/17  1031 06/03/14  1540   CR  --  0.91 1.01*   GFRESTIMATED  --  >90  Non  GFR Calc   >90   GFRESTBLACK  --  >90   GFR Calc   >90   POTASSIUM  --  4.0 3.8   TSH 2.02 1.68  --         BP Readings from Last 3  "Encounters:   03/12/21 126/78   01/20/20 118/74   01/17/20 116/72    Wt Readings from Last 3 Encounters:   03/12/21 81.6 kg (180 lb)   04/09/20 79.4 kg (175 lb)   01/20/20 78 kg (172 lb)                 Review of Systems   Constitutional, HEENT, cardiovascular, pulmonary, gi and gu systems are negative, except as otherwise noted.      Objective    /78   Pulse 80   Temp 97.2  F (36.2  C) (Tympanic)   Ht 1.676 m (5' 6\")   Wt 81.6 kg (180 lb)   SpO2 98%   BMI 29.05 kg/m    Body mass index is 29.05 kg/m .       Physical Exam   GENERAL: healthy, alert and no distress  EYES: Eyes grossly normal to inspection, PERRL and conjunctivae and sclerae normal  HENT: both ears: clear effusion  NECK: no adenopathy, no asymmetry, masses, or scars and thyroid normal to palpation  RESP: lungs clear to auscultation - no rales, rhonchi or wheezes  CV: regular rate and rhythm, normal S1 S2, no S3 or S4, no murmur, click or rub, no peripheral edema and peripheral pulses strong  ABDOMEN: soft, nontender, no hepatosplenomegaly, no masses and bowel sounds normal  SKIN: no suspicious lesions or rashes  PSYCH: mentation appears normal, affect normal/bright                "

## 2021-03-12 NOTE — PATIENT INSTRUCTIONS
Assessment & Plan     Benign paroxysmal positional vertigo of right ear  - Epley exercises  - meclizine (ANTIVERT) 25 MG tablet; Take 1 tablet (25 mg) by mouth 3 times daily as needed for dizziness    Debrox OTC for ear wax    Patient Education     Benign Paroxysmal Positional Vertigo    Benign paroxysmal positional vertigo (BPPV) is a common condition. You feel as if the room is spinning after changing position, moving your head quickly, or even just rolling over in bed.  Vertigo is a false feeling of motion plus disorientation that makes it seem as if the room is spinning. A vertigo attack may cause sudden nausea, vomiting, and heavy sweating. Severe vertigo causes a loss of balance. You may even fall down.  Vertigo is caused by a problem with the inner ear. The inner ear is located behind the middle ear. It is a part of the balance center of the body. It contains small calcium particles within fluid-filled canals (semi-circular canals). These particles can move out of position. This may happen as a result of aging, head injury, or disease of the inner ear. Once that happens, moving your head in certain ways may cause the particles to stimulate the inner ear. This creates the feeling of vertigo.  An episode of vertigo may last seconds, minutes, or hours. Once you are over the first episode of vertigo, it may never return. Sometimes symptoms return off and on for several weeks or longer.  BPPV is treatable. The Epley maneuver is a simple treatment for the common cause of vertigo. Your provider may try to put the calcium particles back in their correct position by having you do a series of head movements.  Home care  Follow these guidelines when caring for yourself at home:    Rest quietly in bed if your symptoms are severe. Change position slowly. There is usually 1 position that will feel best. This might be lying on one side or lying on your back with your head slightly raised on pillows. Until you have no  symptoms, you are at a higher risk of falling. Let someone help you when you get up. Get rid of home hazards such as loose electrical cords and throw rugs. Don t walk in unfamiliar areas that aren't lighted. Use night lights in bathrooms and kitchen areas.    Don't drive or work with dangerous machinery for 1 week after symptoms go away. This is in case symptoms return suddenly.    Take medicine as prescribed to relieve your symptoms. Unless another medicine was prescribed for nausea, vomiting, and vertigo, you may use over-the-counter motion sickness medicine. Examples of this include meclizine and dimenhydrinate. Don't take over-the-counter medicine for this condition without talking with your healthcare provider, especially the first time.  Follow-up care  Follow up with your healthcare provider or an ear, nose, and throat doctor (ENT or otolaryngologist), or as directed. Tell your provider about any ringing in your ear or hearing loss.  If you had a CT or MRI scan, a specialist will review it. You'll be told of any new findings that may affect your care.  When to get medical advice  Call your healthcare provider right away if any of these occur:    Vertigo gets worse even after taking prescribed medicine    Repeated vomiting even after taking prescribed medicine    Weakness that gets worse    Trouble hearing    Fever of 100.4 F (38 C) or higher, or as directed by your healthcare provider  Call 911  Call 911 right away if any of these occur:    Fainting    Severe headache or abnormal drowsiness or confusion    Weakness of an arm or leg or one side of the face    Trouble with speech or vision    Trouble walking    Seizure    Fast heart rate    Chest pain  Servando last reviewed this educational content on 5/1/2020 2000-2020 [employer]. All rights reserved. This information is not intended as a substitute for professional medical care. Always follow your healthcare professional's  instructions.                 Althea Flaim, CNP  [facility]

## 2021-07-30 DIAGNOSIS — F41.9 ANXIETY: ICD-10-CM

## 2021-07-30 RX ORDER — SERTRALINE HYDROCHLORIDE 100 MG/1
TABLET, FILM COATED ORAL
Qty: 135 TABLET | Refills: 0 | Status: SHIPPED | OUTPATIENT
Start: 2021-07-30 | End: 2021-10-25

## 2021-07-30 NOTE — TELEPHONE ENCOUNTER
sertraline      Last Written Prescription Date:  2/3/21  Last Fill Quantity: 135,   # refills: 1  Last Office Visit: 3/12/21  Future Office visit:

## 2021-10-03 ENCOUNTER — HEALTH MAINTENANCE LETTER (OUTPATIENT)
Age: 26
End: 2021-10-03

## 2021-10-24 DIAGNOSIS — F41.9 ANXIETY: ICD-10-CM

## 2021-10-25 RX ORDER — SERTRALINE HYDROCHLORIDE 100 MG/1
TABLET, FILM COATED ORAL
Qty: 135 TABLET | Refills: 0 | Status: SHIPPED | OUTPATIENT
Start: 2021-10-25 | End: 2021-11-02

## 2021-11-01 ENCOUNTER — E-VISIT (OUTPATIENT)
Dept: FAMILY MEDICINE | Facility: OTHER | Age: 26
End: 2021-11-01
Attending: NURSE PRACTITIONER
Payer: COMMERCIAL

## 2021-11-01 DIAGNOSIS — F41.9 ANXIETY: Primary | ICD-10-CM

## 2021-11-01 ASSESSMENT — ANXIETY QUESTIONNAIRES
1. FEELING NERVOUS, ANXIOUS, OR ON EDGE: NEARLY EVERY DAY
2. NOT BEING ABLE TO STOP OR CONTROL WORRYING: NEARLY EVERY DAY
5. BEING SO RESTLESS THAT IT IS HARD TO SIT STILL: MORE THAN HALF THE DAYS
3. WORRYING TOO MUCH ABOUT DIFFERENT THINGS: NEARLY EVERY DAY
6. BECOMING EASILY ANNOYED OR IRRITABLE: MORE THAN HALF THE DAYS
7. FEELING AFRAID AS IF SOMETHING AWFUL MIGHT HAPPEN: NEARLY EVERY DAY
7. FEELING AFRAID AS IF SOMETHING AWFUL MIGHT HAPPEN: NEARLY EVERY DAY
4. TROUBLE RELAXING: MORE THAN HALF THE DAYS
GAD7 TOTAL SCORE: 18
8. IF YOU CHECKED OFF ANY PROBLEMS, HOW DIFFICULT HAVE THESE MADE IT FOR YOU TO DO YOUR WORK, TAKE CARE OF THINGS AT HOME, OR GET ALONG WITH OTHER PEOPLE?: EXTREMELY DIFFICULT

## 2021-11-01 NOTE — TELEPHONE ENCOUNTER
ELECTRONIC VISIT DOCUMENTATION:    E-visit not appropriate.  Appt for tomorrow will be offered.    [facility]a [facility]LEY[facility]  [phone]

## 2021-11-02 ENCOUNTER — OFFICE VISIT (OUTPATIENT)
Dept: FAMILY MEDICINE | Facility: OTHER | Age: 26
End: 2021-11-02
Attending: NURSE PRACTITIONER
Payer: COMMERCIAL

## 2021-11-02 VITALS
BODY MASS INDEX: 31.6 KG/M2 | RESPIRATION RATE: 18 BRPM | OXYGEN SATURATION: 97 % | SYSTOLIC BLOOD PRESSURE: 130 MMHG | HEART RATE: 92 BPM | DIASTOLIC BLOOD PRESSURE: 72 MMHG | WEIGHT: 195.8 LBS | TEMPERATURE: 98.2 F

## 2021-11-02 DIAGNOSIS — F41.9 ANXIETY: ICD-10-CM

## 2021-11-02 DIAGNOSIS — F41.0 PANIC ATTACK: ICD-10-CM

## 2021-11-02 PROCEDURE — 99214 OFFICE O/P EST MOD 30 MIN: CPT | Performed by: NURSE PRACTITIONER

## 2021-11-02 RX ORDER — SERTRALINE HYDROCHLORIDE 100 MG/1
200 TABLET, FILM COATED ORAL DAILY
Qty: 180 TABLET | Refills: 1 | Status: SHIPPED | OUTPATIENT
Start: 2021-11-02 | End: 2022-07-01

## 2021-11-02 RX ORDER — HYDROXYZINE HYDROCHLORIDE 25 MG/1
TABLET, FILM COATED ORAL
Qty: 30 TABLET | Refills: 1 | Status: SHIPPED | OUTPATIENT
Start: 2021-11-02 | End: 2023-12-05

## 2021-11-02 ASSESSMENT — ANXIETY QUESTIONNAIRES
GAD7 TOTAL SCORE: 18
5. BEING SO RESTLESS THAT IT IS HARD TO SIT STILL: NEARLY EVERY DAY
4. TROUBLE RELAXING: MORE THAN HALF THE DAYS
IF YOU CHECKED OFF ANY PROBLEMS ON THIS QUESTIONNAIRE, HOW DIFFICULT HAVE THESE PROBLEMS MADE IT FOR YOU TO DO YOUR WORK, TAKE CARE OF THINGS AT HOME, OR GET ALONG WITH OTHER PEOPLE: EXTREMELY DIFFICULT
3. WORRYING TOO MUCH ABOUT DIFFERENT THINGS: MORE THAN HALF THE DAYS
6. BECOMING EASILY ANNOYED OR IRRITABLE: MORE THAN HALF THE DAYS
1. FEELING NERVOUS, ANXIOUS, OR ON EDGE: NEARLY EVERY DAY
GAD7 TOTAL SCORE: 18
2. NOT BEING ABLE TO STOP OR CONTROL WORRYING: NEARLY EVERY DAY
7. FEELING AFRAID AS IF SOMETHING AWFUL MIGHT HAPPEN: NEARLY EVERY DAY

## 2021-11-02 ASSESSMENT — PATIENT HEALTH QUESTIONNAIRE - PHQ9: SUM OF ALL RESPONSES TO PHQ QUESTIONS 1-9: 6

## 2021-11-02 ASSESSMENT — PAIN SCALES - GENERAL: PAINLEVEL: NO PAIN (0)

## 2021-11-02 NOTE — PROGRESS NOTES
Assessment & Plan       Anxiety  - sertraline (ZOLOFT) 100 MG tablet; Take 2 tablets (200 mg) by mouth daily    Panic attack  - hydrOXYzine (ATARAX) 25 MG tablet; 1 po BID PRN panic attack      Return in about 4 weeks (around 11/30/2021), or virtual - F/U anxiety.      Althea Barragan, SLOAN  [facility] - TIEN Orantes is a 26 year old who presents for the following health issues       Anxiety Follow-Up    How are you doing with your anxiety since your last visit? Worsened     Are you having other symptoms that might be associated with anxiety? No    Have you had a significant life event? No     Are you feeling depressed? yes    Do you have any concerns with your use of alcohol or other drugs? No       Social History     Tobacco Use     Smoking status: Never Smoker     Smokeless tobacco: Current User     Tobacco comment: pt denied QP 1/20/2020   Substance Use Topics     Alcohol use: No     Drug use: No       AZRA-7 SCORE 8/30/2019 2/3/2021 11/1/2021   Total Score - - 18 (severe anxiety)   Total Score 11 5 18       PHQ 8/30/2019 2/3/2021 11/2/2021   PHQ-9 Total Score 13 5 6   Q9: Thoughts of better off dead/self-harm past 2 weeks Not at all Not at all Not at all       Last PHQ-9 11/2/2021   1.  Little interest or pleasure in doing things 2   2.  Feeling down, depressed, or hopeless 1   3.  Trouble falling or staying asleep, or sleeping too much 1   4.  Feeling tired or having little energy 0   5.  Poor appetite or overeating 0   6.  Feeling bad about yourself 0   7.  Trouble concentrating 2   8.  Moving slowly or restless 0   Q9: Thoughts of better off dead/self-harm past 2 weeks 0   PHQ-9 Total Score 6   Difficulty at work, home, or with people Very difficult         AZRA-7  11/1/2021   1. Feeling nervous, anxious, or on edge 3   2. Not being able to stop or control worrying 3   3. Worrying too much about different things 3   4. Trouble relaxing 2   5. Being so restless that it is hard to sit still 2    6. Becoming easily annoyed or irritable 2   7. Feeling afraid, as if something awful might happen 3   AZRA-7 Total Score 18   If you checked any problems, how difficult have they made it for you to do your work, take care of things at home, or get along with other people? -       PHQ 8/30/2019 2/3/2021 11/2/2021   PHQ-9 Total Score 13 5 6   Q9: Thoughts of better off dead/self-harm past 2 weeks Not at all Not at all Not at all         AZRA-7 SCORE 8/30/2019 2/3/2021 11/1/2021   Total Score - - 18 (severe anxiety)   Total Score 11 5 18       Patient Active Problem List   Diagnosis     Chronic rhinitis     Anxiety     History of sinus tachycardia     ACP (advance care planning)     Primary insomnia     Dizziness     Panic attack     No past surgical history on file.    Social History     Tobacco Use     Smoking status: Never Smoker     Smokeless tobacco: Current User     Tobacco comment: pt denied QP 1/20/2020   Substance Use Topics     Alcohol use: No     Family History   Problem Relation Age of Onset     Hypertension Father      Heart Murmur Father              Current Outpatient Medications   Medication Sig Dispense Refill     hydrOXYzine (ATARAX) 25 MG tablet 1 po BID PRN panic attack 30 tablet 1     ibuprofen (ADVIL/MOTRIN) 800 MG tablet Take 1 tablet (800 mg) by mouth every 8 hours as needed for moderate pain 90 tablet 1     meclizine (ANTIVERT) 25 MG tablet Take 1 tablet (25 mg) by mouth 3 times daily as needed for dizziness 60 tablet 1     sertraline (ZOLOFT) 100 MG tablet Take 2 tablets (200 mg) by mouth daily 180 tablet 1         No Known Allergies       Recent Labs   Lab Test 08/30/19  0941 05/03/17  1031 06/03/14  1540 05/05/14  1619   CR  --  0.91 1.01*  --    GFRESTIMATED  --  >90  Non  GFR Calc   >90  --    GFRESTBLACK  --  >90   GFR Calc   >90  --    POTASSIUM  --  4.0 3.8  --    TSH 2.02 1.68  --    < >    < > = values in this interval not displayed.        BP Readings  from Last 3 Encounters:   11/02/21 130/72   03/12/21 126/78   01/20/20 118/74    Wt Readings from Last 3 Encounters:   11/02/21 88.8 kg (195 lb 12.8 oz)   03/12/21 81.6 kg (180 lb)   04/09/20 79.4 kg (175 lb)                Review of Systems   Constitutional, HEENT, cardiovascular, pulmonary, gi and gu systems are negative, except as otherwise noted.        Objective    /72 (BP Location: Right arm, Patient Position: Sitting, Cuff Size: Adult Regular)   Pulse 92   Temp 98.2  F (36.8  C) (Tympanic)   Resp 18   Wt 88.8 kg (195 lb 12.8 oz)   SpO2 97%   BMI 31.60 kg/m    Body mass index is 31.6 kg/m .       Physical Exam   GENERAL: healthy, alert and no distress  RESP: lungs clear to auscultation - no rales, rhonchi or wheezes  CV: regular rate and rhythm, normal S1 S2, no S3 or S4, no murmur, click or rub, no peripheral edema and peripheral pulses strong  SKIN: no suspicious lesions or rashes  PSYCH: anxious

## 2021-11-02 NOTE — PATIENT INSTRUCTIONS
Assessment & Plan       Anxiety  - sertraline (ZOLOFT) 100 MG tablet; Take 2 tablets (200 mg) by mouth daily    Panic attack  - hydrOXYzine (ATARAX) 25 MG tablet; 1 po BID PRN panic attack      Return in about 4 weeks (around 11/30/2021), or virtual - F/U anxiety.      Althea Barragan CNP  [facility] [place] [facility]

## 2021-11-02 NOTE — LETTER
[facility]  [address]  [place] [address]  Phone: [phone]    November 2, 2021        Lamberto Poole  [address] [place] [address]          To whom it may concern:    RE: Lamberto Orantes neha required to miss work on the following dates for medical reasons.   10/9, 10/10. 10/11/21.  10/30/21.          Please contact me for questions or concerns.      Sincerely,        Althea Barragan, CNP

## 2021-11-02 NOTE — NURSING NOTE
"Chief Complaint   Patient presents with     Anxiety       Initial /72 (BP Location: Right arm, Patient Position: Sitting, Cuff Size: Adult Regular)   Pulse 92   Temp 98.2  F (36.8  C) (Tympanic)   Resp 18   Wt 88.8 kg (195 lb 12.8 oz)   SpO2 97%   BMI 31.60 kg/m   Estimated body mass index is 31.6 kg/m  as calculated from the following:    Height as of 3/12/21: 1.676 m (5' 6\").    Weight as of this encounter: 88.8 kg (195 lb 12.8 oz).  Medication Reconciliation: complete  Christina Lewis LPN  "

## 2021-11-03 ASSESSMENT — ANXIETY QUESTIONNAIRES: GAD7 TOTAL SCORE: 18

## 2021-11-09 ENCOUNTER — APPOINTMENT (OUTPATIENT)
Dept: URGENT CARE | Facility: CLINIC | Age: 26
End: 2021-11-09
Payer: COMMERCIAL

## 2021-11-17 ENCOUNTER — APPOINTMENT (OUTPATIENT)
Dept: URGENT CARE | Facility: CLINIC | Age: 26
End: 2021-11-17
Payer: COMMERCIAL

## 2021-12-03 ENCOUNTER — VIRTUAL VISIT (OUTPATIENT)
Dept: FAMILY MEDICINE | Facility: OTHER | Age: 26
End: 2021-12-03
Attending: NURSE PRACTITIONER
Payer: COMMERCIAL

## 2021-12-03 DIAGNOSIS — F41.9 ANXIETY: Primary | ICD-10-CM

## 2021-12-03 DIAGNOSIS — M72.2 PLANTAR FASCIITIS: ICD-10-CM

## 2021-12-03 PROCEDURE — 99213 OFFICE O/P EST LOW 20 MIN: CPT | Mod: 95 | Performed by: NURSE PRACTITIONER

## 2021-12-03 RX ORDER — IBUPROFEN 800 MG/1
800 TABLET, FILM COATED ORAL EVERY 8 HOURS PRN
Qty: 90 TABLET | Refills: 1 | Status: SHIPPED | OUTPATIENT
Start: 2021-12-03 | End: 2023-06-07

## 2021-12-03 ASSESSMENT — ANXIETY QUESTIONNAIRES
5. BEING SO RESTLESS THAT IT IS HARD TO SIT STILL: SEVERAL DAYS
2. NOT BEING ABLE TO STOP OR CONTROL WORRYING: MORE THAN HALF THE DAYS
1. FEELING NERVOUS, ANXIOUS, OR ON EDGE: SEVERAL DAYS
7. FEELING AFRAID AS IF SOMETHING AWFUL MIGHT HAPPEN: SEVERAL DAYS
6. BECOMING EASILY ANNOYED OR IRRITABLE: SEVERAL DAYS
IF YOU CHECKED OFF ANY PROBLEMS ON THIS QUESTIONNAIRE, HOW DIFFICULT HAVE THESE PROBLEMS MADE IT FOR YOU TO DO YOUR WORK, TAKE CARE OF THINGS AT HOME, OR GET ALONG WITH OTHER PEOPLE: VERY DIFFICULT
4. TROUBLE RELAXING: NOT AT ALL
3. WORRYING TOO MUCH ABOUT DIFFERENT THINGS: MORE THAN HALF THE DAYS
GAD7 TOTAL SCORE: 8

## 2021-12-03 NOTE — PROGRESS NOTES
Lamberto is a 26 year old who is being evaluated via a billable video visit.      How would you like to obtain your AVS? MyChart  If the video visit is dropped, the invitation should be resent by: Text to cell phone: [phone]   Will anyone else be joining your video visit? No    Video Start Time:  12:18      Assessment & Plan       Plantar fasciitis  - ibuprofen (ADVIL/MOTRIN) 800 MG tablet; Take 1 tablet (800 mg) by mouth every 8 hours as needed for moderate pain    Anxiety  - Continue plan of care      Follow-up 6 months - sooner as needed      Return in about 6 months (around 6/3/2022).      Althea Barragan, SLOAN  [facility] - TIEN Orantes is a 26 year old who presents for the following health issues       Anxiety Follow-Up    How are you doing with your anxiety since your last visit? Improved     Are you having other symptoms that might be associated with anxiety? No    Have you had a significant life event? No     Are you feeling depressed? Yes:  a little    Do you have any concerns with your use of alcohol or other drugs? No       Plantar Fascitis - would like a refill of his Ibuprofen      Social History     Tobacco Use     Smoking status: Never Smoker     Smokeless tobacco: Current User     Tobacco comment: pt denied QP 1/20/2020   Substance Use Topics     Alcohol use: No     Drug use: No       AZRA-7 SCORE 11/1/2021 11/2/2021 12/3/2021   Total Score 18 (severe anxiety) - -   Total Score 18 18 8       PHQ 2/3/2021 11/2/2021 12/3/2021   PHQ-9 Total Score 5 6 4   Q9: Thoughts of better off dead/self-harm past 2 weeks Not at all Not at all Not at all       Last PHQ-9 12/3/2021   1.  Little interest or pleasure in doing things 1   2.  Feeling down, depressed, or hopeless 0   3.  Trouble falling or staying asleep, or sleeping too much 2   4.  Feeling tired or having little energy 1   5.  Poor appetite or overeating 0   6.  Feeling bad about yourself 0   7.  Trouble concentrating 0   8.  Moving slowly  or restless 0   Q9: Thoughts of better off dead/self-harm past 2 weeks 0   PHQ-9 Total Score 4   Difficulty at work, home, or with people Somewhat difficult       AZRA-7  12/3/2021   1. Feeling nervous, anxious, or on edge 1   2. Not being able to stop or control worrying 2   3. Worrying too much about different things 2   4. Trouble relaxing 0   5. Being so restless that it is hard to sit still 1   6. Becoming easily annoyed or irritable 1   7. Feeling afraid, as if something awful might happen 1   AZRA-7 Total Score 8   If you checked any problems, how difficult have they made it for you to do your work, take care of things at home, or get along with other people? Very difficult       Patient Active Problem List   Diagnosis     Chronic rhinitis     Anxiety     History of sinus tachycardia     ACP (advance care planning)     Primary insomnia     Dizziness     Panic attack     No past surgical history on file.    Social History     Tobacco Use     Smoking status: Never Smoker     Smokeless tobacco: Current User     Tobacco comment: pt denied QP 1/20/2020   Substance Use Topics     Alcohol use: No     Family History   Problem Relation Age of Onset     Hypertension Father      Heart Murmur Father            Current Outpatient Medications   Medication Sig Dispense Refill     hydrOXYzine (ATARAX) 25 MG tablet 1 po BID PRN panic attack 30 tablet 1     ibuprofen (ADVIL/MOTRIN) 800 MG tablet Take 1 tablet (800 mg) by mouth every 8 hours as needed for moderate pain 90 tablet 1     meclizine (ANTIVERT) 25 MG tablet Take 1 tablet (25 mg) by mouth 3 times daily as needed for dizziness 60 tablet 1     sertraline (ZOLOFT) 100 MG tablet Take 2 tablets (200 mg) by mouth daily 180 tablet 1         No Known Allergies       Recent Labs   Lab Test 08/30/19  0941 05/03/17  1031 06/03/14  1540   CR  --  0.91 1.01*   GFRESTIMATED  --  >90  Non  GFR Calc   >90   GFRESTBLACK  --  >90   GFR Calc   >90  "  POTASSIUM  --  4.0 3.8   TSH 2.02 1.68  --         BP Readings from Last 3 Encounters:   11/02/21 130/72   03/12/21 126/78   01/20/20 118/74    Wt Readings from Last 3 Encounters:   11/02/21 88.8 kg (195 lb 12.8 oz)   03/12/21 81.6 kg (180 lb)   04/09/20 79.4 kg (175 lb)                 Review of Systems   Constitutional, HEENT, cardiovascular, pulmonary, gi and gu systems are negative, except as otherwise noted.        Objective    Vitals - Patient Reported  Weight (Patient Reported): 88.9 kg (196 lb)  Height (Patient Reported): 167.6 cm (5' 6\")  BMI (Based on Pt Reported Ht/Wt): 31.63  Pain Score: No Pain (0)        Physical Exam   GENERAL: Healthy, alert and no distress  EYES: Eyes grossly normal to inspection.  No discharge or erythema, or obvious scleral/conjunctival abnormalities.  RESP: No audible wheeze, cough, or visible cyanosis.  No visible retractions or increased work of breathing.    SKIN: Visible skin clear. No significant rash, abnormal pigmentation or lesions.  NEURO: Cranial nerves grossly intact.  Mentation and speech appropriate for age.  PSYCH: Mentation appears normal, affect normal/bright, judgement and insight intact, normal speech and appearance well-groomed.            Video-Visit Details    Type of service:  Video Visit    Video End Time:12:27 PM    Originating Location (pt. Location): Home    Distant Location (provider location):  [facility]     Platform used for Video Visit: Chris  "

## 2021-12-03 NOTE — NURSING NOTE
"Chief Complaint   Patient presents with     Anxiety       Initial There were no vitals taken for this visit. Estimated body mass index is 31.6 kg/m  as calculated from the following:    Height as of 3/12/21: 1.676 m (5' 6\").    Weight as of 11/2/21: 88.8 kg (195 lb 12.8 oz).  Medication Reconciliation: complete  Christina Lewis LPN  "

## 2021-12-03 NOTE — PATIENT INSTRUCTIONS
Assessment & Plan       Plantar fasciitis  - ibuprofen (ADVIL/MOTRIN) 800 MG tablet; Take 1 tablet (800 mg) by mouth every 8 hours as needed for moderate pain    Anxiety  - Continue plan of care      Follow-up 6 months - sooner as needed      Return in about 6 months (around 6/3/2022).      Althea Barragan, SLOAN  [facility] [place] [facility]

## 2021-12-04 ASSESSMENT — ANXIETY QUESTIONNAIRES: GAD7 TOTAL SCORE: 8

## 2021-12-04 ASSESSMENT — PATIENT HEALTH QUESTIONNAIRE - PHQ9: SUM OF ALL RESPONSES TO PHQ QUESTIONS 1-9: 4

## 2022-01-23 ENCOUNTER — HEALTH MAINTENANCE LETTER (OUTPATIENT)
Age: 27
End: 2022-01-23

## 2022-01-25 ENCOUNTER — OFFICE VISIT (OUTPATIENT)
Dept: FAMILY MEDICINE | Facility: OTHER | Age: 27
End: 2022-01-25
Attending: NURSE PRACTITIONER
Payer: COMMERCIAL

## 2022-01-25 VITALS
DIASTOLIC BLOOD PRESSURE: 80 MMHG | HEART RATE: 78 BPM | SYSTOLIC BLOOD PRESSURE: 126 MMHG | OXYGEN SATURATION: 98 % | RESPIRATION RATE: 16 BRPM | TEMPERATURE: 97 F | WEIGHT: 205 LBS | BODY MASS INDEX: 33.09 KG/M2

## 2022-01-25 DIAGNOSIS — R23.8 SCALP IRRITATION: Primary | ICD-10-CM

## 2022-01-25 PROCEDURE — 99213 OFFICE O/P EST LOW 20 MIN: CPT | Performed by: NURSE PRACTITIONER

## 2022-01-25 ASSESSMENT — PAIN SCALES - GENERAL: PAINLEVEL: NO PAIN (0)

## 2022-01-25 NOTE — PROGRESS NOTES
Assessment & Plan        Scalp irritation  - Topical antibiotic BID        Althea Barragan CNP  [facility] - TI[place] Orantes is a 26 year old who presents for the following health issues         Concern - bump on scalp  Onset: few days  Description: itchy bump on scalp  Intensity: mild  Progression of Symptoms:  same  Accompanying Signs & Symptoms: bleeding at times  Previous history of similar problem: none  Precipitating factors:        Worsened by: nothing  Alleviating factors:        Improved by: nothing  Therapies tried and outcome:  none         Patient Active Problem List   Diagnosis     Chronic rhinitis     Anxiety     History of sinus tachycardia     ACP (advance care planning)     Primary insomnia     Dizziness     Panic attack     No past surgical history on file.    Social History     Tobacco Use     Smoking status: Never Smoker     Smokeless tobacco: Current User     Tobacco comment: pt denied QP 1/20/2020   Substance Use Topics     Alcohol use: No     Family History   Problem Relation Age of Onset     Hypertension Father      Heart Murmur Father            Current Outpatient Medications   Medication Sig Dispense Refill     hydrOXYzine (ATARAX) 25 MG tablet 1 po BID PRN panic attack 30 tablet 1     ibuprofen (ADVIL/MOTRIN) 800 MG tablet Take 1 tablet (800 mg) by mouth every 8 hours as needed for moderate pain 90 tablet 1     meclizine (ANTIVERT) 25 MG tablet Take 1 tablet (25 mg) by mouth 3 times daily as needed for dizziness 60 tablet 1     sertraline (ZOLOFT) 100 MG tablet Take 2 tablets (200 mg) by mouth daily 180 tablet 1       No Known Allergies         Recent Labs   Lab Test 08/30/19  0941 05/03/17  1031 06/03/14  1540   CR  --  0.91 1.01*   GFRESTIMATED  --  >90  Non  GFR Calc   >90   GFRESTBLACK  --  >90   GFR Calc   >90   POTASSIUM  --  4.0 3.8   TSH 2.02 1.68  --           BP Readings from Last 3 Encounters:   01/25/22 126/80   11/02/21 130/72    03/12/21 126/78    Wt Readings from Last 3 Encounters:   01/25/22 93 kg (205 lb)   11/02/21 88.8 kg (195 lb 12.8 oz)   03/12/21 81.6 kg (180 lb)                  Review of Systems   Constitutional, HEENT, cardiovascular, pulmonary, gi and gu systems are negative, except as otherwise noted.        Objective    /80 (BP Location: Left arm, Patient Position: Sitting, Cuff Size: Adult Regular)   Pulse 78   Temp 97  F (36.1  C) (Tympanic)   Resp 16   Wt 93 kg (205 lb)   SpO2 98%   BMI 33.09 kg/m    Body mass index is 33.09 kg/m .       Physical Exam   GENERAL: healthy, alert and no distress  RESP: lungs clear to auscultation - no rales, rhonchi or wheezes  SKIN: Solitary red 2 mm sore on posterior scalp.  No drainage.  PSYCH: mentation appears normal, affect normal/bright

## 2022-01-25 NOTE — NURSING NOTE
"Chief Complaint   Patient presents with     itchy scalp       Initial /80 (BP Location: Left arm, Patient Position: Sitting, Cuff Size: Adult Regular)   Pulse 78   Temp 97  F (36.1  C) (Tympanic)   Resp 16   Wt 93 kg (205 lb)   SpO2 98%   BMI 33.09 kg/m   Estimated body mass index is 33.09 kg/m  as calculated from the following:    Height as of 3/12/21: 1.676 m (5' 6\").    Weight as of this encounter: 93 kg (205 lb).  Medication Reconciliation: complete  Christina Lewis LPN  "

## 2022-01-25 NOTE — PATIENT INSTRUCTIONS
Assessment & Plan        Scalp irritation  - Topical antibiotic BID  - Follow-up as needed        Althea Barragan, CNP  [facility] [place] [facility]

## 2022-04-28 ENCOUNTER — E-VISIT (OUTPATIENT)
Dept: URGENT CARE | Facility: CLINIC | Age: 27
End: 2022-04-28
Payer: COMMERCIAL

## 2022-04-28 DIAGNOSIS — B96.89 ACUTE BACTERIAL SINUSITIS: Primary | ICD-10-CM

## 2022-04-28 DIAGNOSIS — J01.90 ACUTE BACTERIAL SINUSITIS: Primary | ICD-10-CM

## 2022-04-28 PROCEDURE — 99421 OL DIG E/M SVC 5-10 MIN: CPT | Performed by: FAMILY MEDICINE

## 2022-06-30 DIAGNOSIS — F41.9 ANXIETY: ICD-10-CM

## 2022-07-01 RX ORDER — SERTRALINE HYDROCHLORIDE 100 MG/1
TABLET, FILM COATED ORAL
Qty: 180 TABLET | Refills: 1 | Status: SHIPPED | OUTPATIENT
Start: 2022-07-01 | End: 2023-01-12

## 2022-07-06 ENCOUNTER — TELEPHONE (OUTPATIENT)
Dept: FAMILY MEDICINE | Facility: OTHER | Age: 27
End: 2022-07-06

## 2022-07-06 NOTE — TELEPHONE ENCOUNTER
Pt of [place]    Pt calling and has refill on Sertraline.He has no insurance till Aug 1st when he will get [facility]. Updated on [facility], declined to be sent to a . Gave [facility] #. Updated to see how much #30 day supply will cost and if pharm can give this amount.Call back if needed.    Any other recommendations?    Evelyn Smart, RN

## 2022-07-06 NOTE — TELEPHONE ENCOUNTER
No, those would be the suggestions I would have as well.  Using [facility], hopefully he can find a cost effective solution until he has insurance again.

## 2022-08-18 ENCOUNTER — NURSE TRIAGE (OUTPATIENT)
Dept: FAMILY MEDICINE | Facility: OTHER | Age: 27
End: 2022-08-18

## 2022-08-18 ENCOUNTER — E-VISIT (OUTPATIENT)
Dept: URGENT CARE | Facility: CLINIC | Age: 27
End: 2022-08-18
Payer: COMMERCIAL

## 2022-08-18 DIAGNOSIS — H92.09 EARACHE SYMPTOMS, UNSPECIFIED LATERALITY: Primary | ICD-10-CM

## 2022-08-18 PROCEDURE — 99207 PR NON-BILLABLE SERV PER CHARTING: CPT | Performed by: EMERGENCY MEDICINE

## 2022-08-18 NOTE — PATIENT INSTRUCTIONS
Dear Labmerto Poole,    We are sorry you are not feeling well. Based on the responses you provided, it is recommended that you be seen in-person in urgent care so we can better evaluate your symptoms. Please click here to find the nearest urgent care location to you.   You will not be charged for this Visit. Thank you for trusting us with your care.    Miky Evans MD

## 2022-08-18 NOTE — TELEPHONE ENCOUNTER
"    Reason for Disposition    Patient wants to be seen    Answer Assessment - Initial Assessment Questions  1. LOCATION: \"Which ear is involved?\"      bilateral  2. ONSET: \"When did the ear start hurting\"       Last Thursday  3. SEVERITY: \"How bad is the pain?\"  (Scale 1-10; mild, moderate or severe)    - MILD (1-3): doesn't interfere with normal activities     - MODERATE (4-7): interferes with normal activities or awakens from sleep     - SEVERE (8-10): excruciating pain, unable to do any normal activities       No pain,  Drainage out of his ears yellow  4. URI SYMPTOMS: \"Do you have a runny nose or cough?\"      Congested nose  5. FEVER: \"Do you have a fever?\" If Yes, ask: \"What is your temperature, how was it measured, and when did it start?\"      no  6. CAUSE: \"Have you been swimming recently?\", \"How often do you use Q-TIPS?\", \"Have you had any recent air travel or scuba diving?\"      no  7. OTHER SYMPTOMS: \"Do you have any other symptoms?\" (e.g., headache, stiff neck, dizziness, vomiting, runny nose, decreased hearing)      Dizziness   8. PREGNANCY: \"Is there any chance you are pregnant?\" \"When was your last menstrual period?\"      no    Protocols used: EARACHE-A-OH      "

## 2022-08-29 ENCOUNTER — E-VISIT (OUTPATIENT)
Dept: URGENT CARE | Facility: CLINIC | Age: 27
End: 2022-08-29
Payer: COMMERCIAL

## 2022-08-29 DIAGNOSIS — R19.7 DIARRHEA, UNSPECIFIED TYPE: Primary | ICD-10-CM

## 2022-08-29 PROCEDURE — 99207 PR NON-BILLABLE SERV PER CHARTING: CPT | Performed by: FAMILY MEDICINE

## 2022-08-29 NOTE — PATIENT INSTRUCTIONS
Dear Lamberto Poole,    We are sorry you are not feeling well. Based on the responses you provided, it is recommended that you be seen in-person in urgent care so we can better evaluate your symptoms. Please click here to find the nearest urgent care location to you.   You will not be charged for this Visit. Thank you for trusting us with your care.    Ana Collado MD

## 2022-08-29 NOTE — PROGRESS NOTES
Assessment & Plan     Dizziness  Unclear cause of Lamberto's dizziness. Exam is reassuring this visit. Will check labs and intervene as needed.There are many things that can lead to the symptoms reported including thyroid disease, diabetes, UTI, recent viral illness, among others. He is quesetioning if this could be due to the change in manufacture of his zoloft and requests permission to decrease dose. It is okay to trial either splitting the dose between AM and PM (100 mg twice a day) or reducing to 150 mg in AM. Advised to follow up with PCP.   - CBC with platelets and differential; Future  - UA with Microscopic reflex to Culture - [facility]/[place]; Future  - TSH with free T4 reflex; Future  - Comprehensive metabolic panel (BMP + Alb, Alk Phos, ALT, AST, Total. Bili, TP); Future  - Hemoglobin A1c; Future  - Hemoglobin A1c  - Comprehensive metabolic panel (BMP + Alb, Alk Phos, ALT, AST, Total. Bili, TP)  - TSH with free T4 reflex  - UA with Microscopic reflex to Culture - [facility]/[place]  - CBC with platelets and differential  - Urine Microscopic    Cloudy urine  - GC/Chlamydia by PCR - [facility]; Future  - GC/Chlamydia by PCR - [facility]    Encounter for screening for HIV    Routine screening for STI (sexually transmitted infection)  He is also requesting STI testning.No known exposures.   - Treponema Abs w Reflex to RPR and Titer; Future  - GC/Chlamydia by PCR - [facility]; Future  - GC/Chlamydia by PCR - [facility]  - Treponema Abs w Reflex to RPR and Titer    Ordering of each unique test       No follow-ups on file.    Roxanne Us, CNP  [facility] - [facility]    Subjective   Lamberto is a 27 year old presenting for the following health issues:  Dizziness      HPI     Concern - dizziness, stool changes  Onset: 2 weeks for dizziness   Description: dizziness  Intensity: mild  Progression of Symptoms:  same  Accompanying Signs & Symptoms: none  Previous history of similar problem: yes, prior ear problems  Precipitating  factors:        Worsened by: laying down  Alleviating factors:        Improved by: nothing  Therapies tried and outcome: ears were treated with antibiotic drops (4/28/)    Night Sweats:  Started about 2 weeks ago. Has not had this in the past. Is on Zoloft but has been at steady dosing over couple months. Manufacture did recently change.     Tested negative for COVID last week while symptomatic. This was an at home test.     Unsure of tick exposure. [place]     Recheck spots on head  Reports he was last checked January 2022  Reports stable size.   No itching or burning.         Review of Systems   Constitutional: Negative for activity change, appetite change, chills, fatigue, fever and unexpected weight change.        Night sweats     HENT: Negative for congestion, ear discharge, ear pain, sinus pain, sore throat and voice change.    Respiratory: Negative.    Cardiovascular: Negative.    Gastrointestinal: Negative.    Endocrine: Negative.    Genitourinary: Negative.         At times it is a bit cloudy, my urine.    Neurological: Positive for dizziness and light-headedness. Negative for tremors, syncope, speech difficulty, weakness, numbness and headaches.            Objective    /78 (BP Location: Left arm, Patient Position: Sitting, Cuff Size: Adult Large)   Pulse 96   Temp 97.9  F (36.6  C) (Tympanic)   Resp 12   Wt 95.7 kg (211 lb)   SpO2 97%   BMI 34.06 kg/m    Body mass index is 34.06 kg/m .  Physical Exam   GENERAL: healthy, alert and no distress  EYES: Eyes grossly normal to inspection, PERRL and conjunctivae and sclerae normal  HENT: ear canals and TM's normal, nose and mouth without ulcers or lesions  NECK: no adenopathy, no asymmetry, masses, or scars and thyroid normal to palpation  RESP: lungs clear to auscultation - no rales, rhonchi or wheezes  CV: regular rate and rhythm, normal S1 S2, no S3 or S4, no murmur, click or rub, no peripheral edema and peripheral pulses  strong  MS: no gross musculoskeletal defects noted, no edema  SKIN: no suspicious lesions or rashes  NEURO: Normal strength and tone, mentation intact and speech normal. Normal HINTS exam.     Results for orders placed or performed in visit on 08/30/22   Treponema Abs w Reflex to RPR and Titer     Status: Normal   Result Value Ref Range    Treponema Antibody Total Nonreactive Nonreactive   Hemoglobin A1c     Status: None   Result Value Ref Range    Estimated Average Glucose 103 mg/dL    Hemoglobin A1C 5.2 0.0 - 5.6 %   Comprehensive metabolic panel (BMP + Alb, Alk Phos, ALT, AST, Total. Bili, TP)     Status: Normal   Result Value Ref Range    Sodium 135 133 - 144 mmol/L    Potassium 4.1 3.4 - 5.3 mmol/L    Chloride 102 94 - 109 mmol/L    Carbon Dioxide (CO2) 23 20 - 32 mmol/L    Anion Gap 10 3 - 14 mmol/L    Urea Nitrogen 11 7 - 30 mg/dL    Creatinine 1.08 0.66 - 1.25 mg/dL    Calcium 9.4 8.5 - 10.1 mg/dL    Glucose 93 70 - 99 mg/dL    Alkaline Phosphatase 65 40 - 150 U/L    AST 20 0 - 45 U/L    ALT 35 0 - 70 U/L    Protein Total 8.1 6.8 - 8.8 g/dL    Albumin 4.5 3.4 - 5.0 g/dL    Bilirubin Total 0.3 0.2 - 1.3 mg/dL    GFR Estimate >90 >60 mL/min/1.73m2   TSH with free T4 reflex     Status: Normal   Result Value Ref Range    TSH 2.31 0.40 - 4.00 mU/L   UA with Microscopic reflex to Culture - [facility]     Status: Abnormal    Specimen: Urine, Midstream   Result Value Ref Range    Color Urine Yellow Colorless, Straw, Light Yellow, Yellow    Appearance Urine Clear Clear    Glucose Urine Negative Negative mg/dL    Bilirubin Urine Negative Negative    Ketones Urine Negative Negative mg/dL    Specific Gravity Urine 1.020 1.003 - 1.035    Blood Urine Trace (A) Negative    pH Urine 6.0 5.0 - 7.0    Protein Albumin Urine Negative Negative mg/dL    Urobilinogen Urine 0.2 0.2, 1.0 E.U./dL    Nitrite Urine Negative Negative    Leukocyte Esterase Urine Negative Negative   CBC with platelets and differential     Status:  None   Result Value Ref Range    WBC Count 8.2 4.0 - 11.0 10e3/uL    RBC Count 5.32 4.40 - 5.90 10e6/uL    Hemoglobin 16.2 13.3 - 17.7 g/dL    Hematocrit 44.5 40.0 - 53.0 %    MCV 84 78 - 100 fL    MCH 30.5 26.5 - 33.0 pg    MCHC 36.4 31.5 - 36.5 g/dL    RDW 13.1 10.0 - 15.0 %    Platelet Count 269 150 - 450 10e3/uL    % Neutrophils 75 %    % Lymphocytes 15 %    % Monocytes 9 %    % Eosinophils 1 %    % Basophils 0 %    Absolute Neutrophils 6.1 1.6 - 8.3 10e3/uL    Absolute Lymphocytes 1.2 0.8 - 5.3 10e3/uL    Absolute Monocytes 0.8 0.0 - 1.3 10e3/uL    Absolute Eosinophils 0.1 0.0 - 0.7 10e3/uL    Absolute Basophils 0.0 0.0 - 0.2 10e3/uL   Urine Microscopic     Status: Abnormal   Result Value Ref Range    Bacteria Urine Few (A) None Seen /HPF    RBC Urine 0-2 0-2 /HPF /HPF    WBC Urine 0-5 0-5 /HPF /HPF    Squamous Epithelials Urine Few (A) None Seen /LPF    Mucus Urine Present (A) None Seen /LPF    Narrative    Urine Culture not indicated   RBC and Platelet Morphology     Status: None   Result Value Ref Range    Platelet Assessment  Automated Count Confirmed. Platelet morphology is normal.     Automated Count Confirmed. Platelet morphology is normal.    RBC Morphology Confirmed RBC Indices    GC/Chlamydia by PCR - [facility]     Status: Normal    Specimen: Urine, Voided   Result Value Ref Range    Chlamydia Trachomatis Negative Negative    Neisseria gonorrhoeae Negative Negative    Narrative    Assay performed using [employer] Check real-time, reverse-transcriptase PCR.   CBC with platelets and differential     Status: None    Narrative    The following orders were created for panel order CBC with platelets and differential.  Procedure                               Abnormality         Status                     ---------                               -----------         ------                     CBC with platelets and d...[[id number]]                      Final result               RBC and Platelet Morphology[[id number]]                       Final result                 Please view results for these tests on the individual orders.                 .  ..

## 2022-08-30 ENCOUNTER — OFFICE VISIT (OUTPATIENT)
Dept: FAMILY MEDICINE | Facility: OTHER | Age: 27
End: 2022-08-30
Attending: NURSE PRACTITIONER
Payer: COMMERCIAL

## 2022-08-30 VITALS
HEART RATE: 96 BPM | OXYGEN SATURATION: 97 % | DIASTOLIC BLOOD PRESSURE: 78 MMHG | BODY MASS INDEX: 34.06 KG/M2 | WEIGHT: 211 LBS | RESPIRATION RATE: 12 BRPM | SYSTOLIC BLOOD PRESSURE: 124 MMHG | TEMPERATURE: 97.9 F

## 2022-08-30 DIAGNOSIS — R42 DIZZINESS: ICD-10-CM

## 2022-08-30 DIAGNOSIS — Z11.3 ROUTINE SCREENING FOR STI (SEXUALLY TRANSMITTED INFECTION): ICD-10-CM

## 2022-08-30 DIAGNOSIS — Z11.4 ENCOUNTER FOR SCREENING FOR HIV: ICD-10-CM

## 2022-08-30 DIAGNOSIS — R82.90 CLOUDY URINE: Primary | ICD-10-CM

## 2022-08-30 LAB
ALBUMIN SERPL-MCNC: 4.5 G/DL (ref 3.4–5)
ALBUMIN UR-MCNC: NEGATIVE MG/DL
ALP SERPL-CCNC: 65 U/L (ref 40–150)
ALT SERPL W P-5'-P-CCNC: 35 U/L (ref 0–70)
ANION GAP SERPL CALCULATED.3IONS-SCNC: 10 MMOL/L (ref 3–14)
APPEARANCE UR: CLEAR
AST SERPL W P-5'-P-CCNC: 20 U/L (ref 0–45)
BACTERIA #/AREA URNS HPF: ABNORMAL /HPF
BASOPHILS # BLD AUTO: 0 10E3/UL (ref 0–0.2)
BASOPHILS NFR BLD AUTO: 0 %
BILIRUB SERPL-MCNC: 0.3 MG/DL (ref 0.2–1.3)
BILIRUB UR QL STRIP: NEGATIVE
BUN SERPL-MCNC: 11 MG/DL (ref 7–30)
C TRACH DNA SPEC QL PROBE+SIG AMP: NEGATIVE
CALCIUM SERPL-MCNC: 9.4 MG/DL (ref 8.5–10.1)
CHLORIDE BLD-SCNC: 102 MMOL/L (ref 94–109)
CO2 SERPL-SCNC: 23 MMOL/L (ref 20–32)
COLOR UR AUTO: YELLOW
CREAT SERPL-MCNC: 1.08 MG/DL (ref 0.66–1.25)
EOSINOPHIL # BLD AUTO: 0.1 10E3/UL (ref 0–0.7)
EOSINOPHIL NFR BLD AUTO: 1 %
ERYTHROCYTE [DISTWIDTH] IN BLOOD BY AUTOMATED COUNT: 13.1 % (ref 10–15)
EST. AVERAGE GLUCOSE BLD GHB EST-MCNC: 103 MG/DL
GFR SERPL CREATININE-BSD FRML MDRD: >90 ML/MIN/1.73M2
GLUCOSE BLD-MCNC: 93 MG/DL (ref 70–99)
GLUCOSE UR STRIP-MCNC: NEGATIVE MG/DL
HBA1C MFR BLD: 5.2 % (ref 0–5.6)
HCT VFR BLD AUTO: 44.5 % (ref 40–53)
HGB BLD-MCNC: 16.2 G/DL (ref 13.3–17.7)
HGB UR QL STRIP: ABNORMAL
KETONES UR STRIP-MCNC: NEGATIVE MG/DL
LEUKOCYTE ESTERASE UR QL STRIP: NEGATIVE
LYMPHOCYTES # BLD AUTO: 1.2 10E3/UL (ref 0.8–5.3)
LYMPHOCYTES NFR BLD AUTO: 15 %
MCH RBC QN AUTO: 30.5 PG (ref 26.5–33)
MCHC RBC AUTO-ENTMCNC: 36.4 G/DL (ref 31.5–36.5)
MCV RBC AUTO: 84 FL (ref 78–100)
MONOCYTES # BLD AUTO: 0.8 10E3/UL (ref 0–1.3)
MONOCYTES NFR BLD AUTO: 9 %
MUCOUS THREADS #/AREA URNS LPF: PRESENT /LPF
N GONORRHOEA DNA SPEC QL NAA+PROBE: NEGATIVE
NEUTROPHILS # BLD AUTO: 6.1 10E3/UL (ref 1.6–8.3)
NEUTROPHILS NFR BLD AUTO: 75 %
NITRATE UR QL: NEGATIVE
PH UR STRIP: 6 [PH] (ref 5–7)
PLAT MORPH BLD: NORMAL
PLATELET # BLD AUTO: 269 10E3/UL (ref 150–450)
POTASSIUM BLD-SCNC: 4.1 MMOL/L (ref 3.4–5.3)
PROT SERPL-MCNC: 8.1 G/DL (ref 6.8–8.8)
RBC # BLD AUTO: 5.32 10E6/UL (ref 4.4–5.9)
RBC #/AREA URNS AUTO: ABNORMAL /HPF
RBC MORPH BLD: NORMAL
SODIUM SERPL-SCNC: 135 MMOL/L (ref 133–144)
SP GR UR STRIP: 1.02 (ref 1–1.03)
SQUAMOUS #/AREA URNS AUTO: ABNORMAL /LPF
TSH SERPL DL<=0.005 MIU/L-ACNC: 2.31 MU/L (ref 0.4–4)
UROBILINOGEN UR STRIP-ACNC: 0.2 E.U./DL
WBC # BLD AUTO: 8.2 10E3/UL (ref 4–11)
WBC #/AREA URNS AUTO: ABNORMAL /HPF

## 2022-08-30 PROCEDURE — 36415 COLL VENOUS BLD VENIPUNCTURE: CPT | Performed by: NURSE PRACTITIONER

## 2022-08-30 PROCEDURE — 83036 HEMOGLOBIN GLYCOSYLATED A1C: CPT | Performed by: NURSE PRACTITIONER

## 2022-08-30 PROCEDURE — 99213 OFFICE O/P EST LOW 20 MIN: CPT | Performed by: NURSE PRACTITIONER

## 2022-08-30 PROCEDURE — 81001 URINALYSIS AUTO W/SCOPE: CPT | Performed by: NURSE PRACTITIONER

## 2022-08-30 PROCEDURE — 80050 GENERAL HEALTH PANEL: CPT | Performed by: NURSE PRACTITIONER

## 2022-08-30 PROCEDURE — 86780 TREPONEMA PALLIDUM: CPT | Performed by: NURSE PRACTITIONER

## 2022-08-30 PROCEDURE — 87491 CHLMYD TRACH DNA AMP PROBE: CPT | Performed by: NURSE PRACTITIONER

## 2022-08-30 PROCEDURE — 87591 N.GONORRHOEAE DNA AMP PROB: CPT | Performed by: NURSE PRACTITIONER

## 2022-08-30 ASSESSMENT — ENCOUNTER SYMPTOMS
HEADACHES: 0
NUMBNESS: 0
SPEECH DIFFICULTY: 0
DIZZINESS: 1
RESPIRATORY NEGATIVE: 1
CHILLS: 0
SORE THROAT: 0
WEAKNESS: 0
ACTIVITY CHANGE: 0
CARDIOVASCULAR NEGATIVE: 1
TREMORS: 0
UNEXPECTED WEIGHT CHANGE: 0
APPETITE CHANGE: 0
VOICE CHANGE: 0
FEVER: 0
GASTROINTESTINAL NEGATIVE: 1
SINUS PAIN: 0
LIGHT-HEADEDNESS: 1
ENDOCRINE NEGATIVE: 1
FATIGUE: 0

## 2022-08-30 ASSESSMENT — PAIN SCALES - GENERAL: PAINLEVEL: NO PAIN (0)

## 2022-08-30 NOTE — NURSING NOTE
"Chief Complaint   Patient presents with     Dizziness       Initial /78 (BP Location: Left arm, Patient Position: Sitting, Cuff Size: Adult Large)   Pulse 96   Temp 97.9  F (36.6  C) (Tympanic)   Resp 12   Wt 95.7 kg (211 lb)   SpO2 97%   BMI 34.06 kg/m   Estimated body mass index is 34.06 kg/m  as calculated from the following:    Height as of 3/12/21: 1.676 m (5' 6\").    Weight as of this encounter: 95.7 kg (211 lb).  Medication Reconciliation: complete  Laura Marie LPN    "

## 2022-08-31 ENCOUNTER — TELEPHONE (OUTPATIENT)
Dept: FAMILY MEDICINE | Facility: OTHER | Age: 27
End: 2022-08-31

## 2022-08-31 ENCOUNTER — E-VISIT (OUTPATIENT)
Dept: URGENT CARE | Facility: CLINIC | Age: 27
End: 2022-08-31
Payer: COMMERCIAL

## 2022-08-31 DIAGNOSIS — R82.90 CLOUDY URINE: Primary | ICD-10-CM

## 2022-08-31 PROCEDURE — 99421 OL DIG E/M SVC 5-10 MIN: CPT | Performed by: PHYSICIAN ASSISTANT

## 2022-08-31 NOTE — TELEPHONE ENCOUNTER
All labs are normal so far  Treponema pending and in process    Althea Barragan [facility]  [phone]

## 2022-08-31 NOTE — PATIENT INSTRUCTIONS
Dear Lamberto Poole    I have reviewed your urinalysis from yesterday and agree- there is no sign of a urinary tract infection or anything concerning. As far as your vertigo goes, I am unsure what was discussed yesterday as the provider's note from the visit is not finished so please go to urgent care to be seen in person if you have further issues or concerns regarding that.    Thanks for choosing us as your health care partner,    Diane Al PA-C

## 2022-08-31 NOTE — TELEPHONE ENCOUNTER
Patient called clinic asking for results from labs that were completed at yesterday's visit. Message being sent to PCP due to ordering provider not being in the office. Contact information verified.

## 2022-09-01 LAB — T PALLIDUM AB SER QL: NONREACTIVE

## 2022-09-04 ENCOUNTER — HEALTH MAINTENANCE LETTER (OUTPATIENT)
Age: 27
End: 2022-09-04

## 2022-09-20 ENCOUNTER — NURSE TRIAGE (OUTPATIENT)
Dept: FAMILY MEDICINE | Facility: OTHER | Age: 27
End: 2022-09-20

## 2022-09-20 ENCOUNTER — OFFICE VISIT (OUTPATIENT)
Dept: FAMILY MEDICINE | Facility: OTHER | Age: 27
End: 2022-09-20
Attending: NURSE PRACTITIONER
Payer: COMMERCIAL

## 2022-09-20 VITALS
RESPIRATION RATE: 17 BRPM | SYSTOLIC BLOOD PRESSURE: 126 MMHG | BODY MASS INDEX: 34.12 KG/M2 | OXYGEN SATURATION: 98 % | HEART RATE: 101 BPM | TEMPERATURE: 97.2 F | WEIGHT: 211.4 LBS | DIASTOLIC BLOOD PRESSURE: 74 MMHG

## 2022-09-20 DIAGNOSIS — F51.01 PRIMARY INSOMNIA: ICD-10-CM

## 2022-09-20 DIAGNOSIS — J06.9 VIRAL URI: Primary | ICD-10-CM

## 2022-09-20 DIAGNOSIS — F41.9 ANXIETY: ICD-10-CM

## 2022-09-20 PROBLEM — R42 DIZZINESS: Status: RESOLVED | Noted: 2020-01-29 | Resolved: 2022-09-20

## 2022-09-20 PROCEDURE — 99213 OFFICE O/P EST LOW 20 MIN: CPT | Performed by: NURSE PRACTITIONER

## 2022-09-20 ASSESSMENT — ANXIETY QUESTIONNAIRES
GAD7 TOTAL SCORE: 4
GAD7 TOTAL SCORE: 4
7. FEELING AFRAID AS IF SOMETHING AWFUL MIGHT HAPPEN: SEVERAL DAYS
1. FEELING NERVOUS, ANXIOUS, OR ON EDGE: NOT AT ALL
5. BEING SO RESTLESS THAT IT IS HARD TO SIT STILL: NOT AT ALL
2. NOT BEING ABLE TO STOP OR CONTROL WORRYING: SEVERAL DAYS
6. BECOMING EASILY ANNOYED OR IRRITABLE: SEVERAL DAYS
IF YOU CHECKED OFF ANY PROBLEMS ON THIS QUESTIONNAIRE, HOW DIFFICULT HAVE THESE PROBLEMS MADE IT FOR YOU TO DO YOUR WORK, TAKE CARE OF THINGS AT HOME, OR GET ALONG WITH OTHER PEOPLE: NOT DIFFICULT AT ALL
3. WORRYING TOO MUCH ABOUT DIFFERENT THINGS: SEVERAL DAYS

## 2022-09-20 ASSESSMENT — PATIENT HEALTH QUESTIONNAIRE - PHQ9
5. POOR APPETITE OR OVEREATING: NOT AT ALL
SUM OF ALL RESPONSES TO PHQ QUESTIONS 1-9: 3

## 2022-09-20 ASSESSMENT — PAIN SCALES - GENERAL: PAINLEVEL: MILD PAIN (3)

## 2022-09-20 NOTE — NURSING NOTE
"Chief Complaint   Patient presents with     Pharyngitis     Fever       Initial /74 (BP Location: Left arm, Patient Position: Sitting, Cuff Size: Adult Large)   Pulse 101   Temp 97.2  F (36.2  C) (Tympanic)   Resp 17   Wt 95.9 kg (211 lb 6.4 oz)   SpO2 98%   BMI 34.12 kg/m   Estimated body mass index is 34.12 kg/m  as calculated from the following:    Height as of 3/12/21: 1.676 m (5' 6\").    Weight as of this encounter: 95.9 kg (211 lb 6.4 oz).  Medication Reconciliation: complete  Christina Lewis LPN  "

## 2022-09-20 NOTE — PATIENT INSTRUCTIONS
Assessment & Plan       Viral URI  - Supportive Care  - Please MyChart me tomorrow if you are not better or if you are worse      Mucinex liquid OTC  Insure adequate fluid intake  Get plenty of rest  Monitor for temp at home, treat with OTC Tylenol or Ibuprofen per package instruction.  Please return in you do not improve  To UC or ER with persistent, worsening, or concerning symptoms      Anxiety  - Continue plan of care      Primary insomnia  - Continue plan of care        Return in about 6 months (around 3/20/2023).      Althea Barragan, SLOAN  [facility]

## 2022-09-20 NOTE — LETTER
[facility]  [address]  [address]  [place] [address]  Phone: [phone]    September 20, 2022        Lamberto Poole  [address] [place] [address]          To whom it may concern:    RE: Lamberto Poole      Please excuse from work 9/20 and 9/21/22.     Please contact me for questions or concerns.      Sincerely,        Althea Barragan, CNP

## 2022-09-20 NOTE — TELEPHONE ENCOUNTER
"Patient's mom calling for patient to schedule an appointment for a sore throat that started yesterday. Patient is scheduled today with PCP.    Next 5 appointments (look out 90 days)    Sep 20, 2022  9:30 AM  (Arrive by 9:15 AM)  SHORT with Althea Barragan CNP  [facility] ([facility] ) [address]  [facility] [address]  [phone]            Reason for Disposition    [1] Sore throat is the only symptom AND [2] sore throat present < 48 hours    Additional Information    Negative: SEVERE difficulty breathing (e.g., struggling for each breath, speaks in single words, stridor)    Negative: Sounds like a life-threatening emergency to the triager    Negative: [1] Diagnosed strep throat AND [2] taking antibiotic AND [3] symptoms continue    Negative: Throat culture results, call about    Negative: Productive cough is main symptom    Negative: Non-productive cough is main symptom    Negative: Hoarseness is main symptom    Negative: Runny nose is main symptom    Negative: Uvula swelling is main symptom    Negative: [1] Drooling or spitting out saliva (because can't swallow) AND [2] normal breathing    Negative: Unable to open mouth completely    Negative: [1] Difficulty breathing AND [2] not severe    Negative: Fever > 104 F (40 C)    Negative: [1] Refuses to drink anything AND [2] for > 12 hours    Negative: [1] Drinking very little AND [2] dehydration suspected (e.g., no urine > 12 hours, very dry mouth, very lightheaded)    Negative: Patient sounds very sick or weak to the triager    Negative: SEVERE (e.g., excruciating) throat pain    Negative: [1] Pus on tonsils (back of throat) AND [2]  fever AND [3] swollen neck lymph nodes (\"glands\")    Negative: [1] Rash AND [2] widespread (especially chest and abdomen)    Negative: Earache also present    Negative: Fever present > 3 days (72 hours)    Negative: Diabetes mellitus or weak immune system (e.g., HIV positive, " "cancer chemo, splenectomy, organ transplant, chronic steroids)    Negative: History of rheumatic fever    Negative: [1] Adult is leaving on a trip AND [2] requests an antibiotic NOW    Negative: [1] Positive throat culture or rapid strep test (according to lab, PCP, caller, etc.) AND [2] NO  standing order to call in prescription for antibiotic    Negative: [1] Exposure to family member (or spouse or boyfriend/girlfriend) with test-proven strep AND [2] within last 10 days    Negative: [1] Sore throat is the only symptom AND [2] present > 48 hours    Negative: [1] Sore throat with cough/cold symptoms AND [2] present > 5 days    Answer Assessment - Initial Assessment Questions  1. ONSET: \"When did the throat start hurting?\" (Hours or days ago)       yesterday  2. SEVERITY: \"How bad is the sore throat?\" (Scale 1-10; mild, moderate or severe)    - MILD (1-3):  doesn't interfere with eating or normal activities    - MODERATE (4-7): interferes with eating some solids and normal activities    - SEVERE (8-10):  excruciating pain, interferes with most normal activities    - SEVERE DYSPHAGIA: can't swallow liquids, drooling      moderate  3. STREP EXPOSURE: \"Has there been any exposure to strep within the past week?\" If Yes, ask: \"What type of contact occurred?\"       No   4.  VIRAL SYMPTOMS: \"Are there any symptoms of a cold, such as a runny nose, cough, hoarse voice or red eyes?\"       Fever, headache  5. FEVER: \"Do you have a fever?\" If Yes, ask: \"What is your temperature, how was it measured, and when did it start?\"      101.2 oral. 1 AM   6. PUS ON THE TONSILS: \"Is there pus on the tonsils in the back of your throat?\"      White spots in back of throat  7. OTHER SYMPTOMS: \"Do you have any other symptoms?\" (e.g., difficulty breathing, headache, rash)      Body aches   8. PREGNANCY: \"Is there any chance you are pregnant?\" \"When was your last menstrual period?\"      NA    Protocols used: SORE THROAT-A-AH      "

## 2022-09-20 NOTE — PROGRESS NOTES
Assessment & Plan       Viral URI  - Supportive Care  - Please MyChart me tomorrow if you are not better or if you are worse      Mucinex liquid OTC  Insure adequate fluid intake  Get plenty of rest  Monitor for temp at home, treat with OTC Tylenol or Ibuprofen per package instruction.  Please return in you do not improve  To UC or ER with persistent, worsening, or concerning symptoms      Anxiety  - Continue plan of care      Primary insomnia  - Continue plan of care        Return in about 6 months (around 3/20/2023).        Althea Barragan CNP  [facility] - TIEN Orantes is a 27 year old, presenting for the following health issues:  Pharyngitis and Fever        Acute Illness  Acute illness concerns: sore throat  Onset/Duration: 1 day  Symptoms:  Fever: YES  Chills/Sweats: YES  Headache (location?): YES  Sinus Pressure: No  Conjunctivitis:  No  Ear Pain: no  Rhinorrhea: YES  Congestion: No  Sore Throat: YES  Cough: no  Wheeze: No  Decreased Appetite: YES  Nausea: No  Vomiting: No  Diarrhea: No  Dysuria/Freq.: No  Dysuria or Hematuria: No  Fatigue/Achiness: YES  Sick/Strep Exposure: No  Therapies tried and outcome: ibuprofen        Anxiety Follow-Up    How are you doing with your anxiety since your last visit? Improved     Are you having other symptoms that might be associated with anxiety? No    Have you had a significant life event? No     Are you feeling depressed? No    Do you have any concerns with your use of alcohol or other drugs? No       Insomnia  - Chronic  - Uses OTC sleep aid, effective    Social History     Tobacco Use     Smoking status: Never Smoker     Smokeless tobacco: Current User     Tobacco comment: pt denied QP 1/20/2020   Substance Use Topics     Alcohol use: No     Drug use: No         AZRA-7 SCORE 11/2/2021 12/3/2021 9/20/2022   Total Score - - -   Total Score 18 8 4         PHQ 11/2/2021 12/3/2021 9/20/2022   PHQ-9 Total Score 6 4 3   Q9: Thoughts of better off  dead/self-harm past 2 weeks Not at all Not at all Not at all             Patient Active Problem List   Diagnosis     Chronic rhinitis     Anxiety     History of sinus tachycardia     ACP (advance care planning)     Primary insomnia     Panic attack     No past surgical history on file.    Social History     Tobacco Use     Smoking status: Never Smoker     Smokeless tobacco: Current User     Tobacco comment: pt denied QP 1/20/2020   Substance Use Topics     Alcohol use: No     Family History   Problem Relation Age of Onset     Hypertension Father      Heart Murmur Father            Current Outpatient Medications   Medication Sig Dispense Refill     sertraline (ZOLOFT) 100 MG tablet TAKE 2 TABLETS(200 MG) BY MOUTH DAILY 180 tablet 1     hydrOXYzine (ATARAX) 25 MG tablet 1 po BID PRN panic attack (Patient not taking: No sig reported) 30 tablet 1     ibuprofen (ADVIL/MOTRIN) 800 MG tablet Take 1 tablet (800 mg) by mouth every 8 hours as needed for moderate pain (Patient not taking: No sig reported) 90 tablet 1     No Known Allergies  Recent Labs   Lab Test 08/30/22  1434 08/30/19  0941 05/03/17  1031   A1C 5.2  --   --    ALT 35  --   --    CR 1.08  --  0.91   GFRESTIMATED >90  --  >90  Non  GFR Calc     GFRESTBLACK  --   --  >90  African American GFR Calc     POTASSIUM 4.1  --  4.0   TSH 2.31 2.02 1.68        BP Readings from Last 3 Encounters:   09/20/22 126/74   08/30/22 124/78   01/25/22 126/80    Wt Readings from Last 3 Encounters:   09/20/22 95.9 kg (211 lb 6.4 oz)   08/30/22 95.7 kg (211 lb)   01/25/22 93 kg (205 lb)                  Review of Systems   Constitutional, HEENT, cardiovascular, pulmonary, gi and gu systems are negative, except as otherwise noted.          Objective    /74 (BP Location: Left arm, Patient Position: Sitting, Cuff Size: Adult Large)   Pulse 101   Temp 97.2  F (36.2  C) (Tympanic)   Resp 17   Wt 95.9 kg (211 lb 6.4 oz)   SpO2 98%   BMI 34.12 kg/m    Body mass  index is 34.12 kg/m .         Physical Exam   GENERAL: healthy, alert and no distress  EYES: Eyes grossly normal to inspection, PERRL and conjunctivae and sclerae normal  HENT: ear canals and TM's normal, nose and mouth without ulcers or lesions, oropharynx clear and oral mucous membranes moist  NECK: no adenopathy, no asymmetry, masses, or scars and thyroid normal to palpation  RESP: lungs clear to auscultation - no rales, rhonchi or wheezes  CV: regular rate and rhythm, normal S1 S2, no S3 or S4, no murmur, click or rub, no peripheral edema and peripheral pulses strong  SKIN: no suspicious lesions or rashes

## 2022-10-07 ENCOUNTER — E-VISIT (OUTPATIENT)
Dept: URGENT CARE | Facility: CLINIC | Age: 27
End: 2022-10-07
Payer: COMMERCIAL

## 2022-10-07 DIAGNOSIS — H10.32 ACUTE BACTERIAL CONJUNCTIVITIS OF LEFT EYE: Primary | ICD-10-CM

## 2022-10-07 PROCEDURE — 99421 OL DIG E/M SVC 5-10 MIN: CPT | Performed by: FAMILY MEDICINE

## 2022-10-07 RX ORDER — POLYMYXIN B SULFATE AND TRIMETHOPRIM 1; 10000 MG/ML; [USP'U]/ML
1-2 SOLUTION OPHTHALMIC EVERY 6 HOURS
Qty: 10 ML | Refills: 0 | Status: SHIPPED | OUTPATIENT
Start: 2022-10-07 | End: 2022-10-14

## 2023-01-10 DIAGNOSIS — F41.9 ANXIETY: ICD-10-CM

## 2023-01-12 RX ORDER — SERTRALINE HYDROCHLORIDE 100 MG/1
TABLET, FILM COATED ORAL
Qty: 180 TABLET | Refills: 0 | Status: SHIPPED | OUTPATIENT
Start: 2023-01-12 | End: 2023-04-17

## 2023-04-17 ENCOUNTER — E-VISIT (OUTPATIENT)
Dept: URGENT CARE | Facility: CLINIC | Age: 28
End: 2023-04-17
Payer: COMMERCIAL

## 2023-04-17 DIAGNOSIS — F41.9 ANXIETY: ICD-10-CM

## 2023-04-17 DIAGNOSIS — J01.90 ACUTE SINUSITIS, RECURRENCE NOT SPECIFIED, UNSPECIFIED LOCATION: Primary | ICD-10-CM

## 2023-04-17 PROCEDURE — 99421 OL DIG E/M SVC 5-10 MIN: CPT | Performed by: NURSE PRACTITIONER

## 2023-04-17 RX ORDER — SERTRALINE HYDROCHLORIDE 100 MG/1
TABLET, FILM COATED ORAL
Qty: 180 TABLET | Refills: 0 | Status: SHIPPED | OUTPATIENT
Start: 2023-04-17 | End: 2023-06-07

## 2023-04-17 NOTE — TELEPHONE ENCOUNTER
Sertraline (Zoloft) 100 MG tablet     Last Written Prescription Date:  01/12/2023  Last Fill Quantity: 180,   # refills: 0  Last Office Visit: 09/20/2022

## 2023-04-17 NOTE — PATIENT INSTRUCTIONS
* Sinusitis (No Antibiotics)    The sinuses are air-filled spaces within the bones of the face. They connect to the inside of the nose. Sinusitis is an inflammation of the tissue that lines the sinuses. Sinusitis can occur during a cold. It can also happen due to allergies to pollens and other particles in the air. It can cause symptoms such as sinus congestion, headache, sore throat, facial swelling, and a feeling of fullness. It may also cause a low-grade fever. Your sinusitis does not include an infection with bacteria. Because of this, antibiotics are not used to treat this problem.  Home care  Drink plenty of water, hot tea, and other liquids. This may help thin nasal mucus. It also may help your sinuses drain fluids.  Heat may help soothe painful areas of your face. Use a towel soaked in hot water. Or,  the shower and direct the warm spray onto your face. Using a vaporizer along with a menthol rub at night may also help soothe symptoms.   An expectorant with guaifenesin may help thin nasal mucus and help your sinuses drain fluids.  You can use an over-the-counter decongestant, unless a similar medicine was prescribed to you. Nasal sprays work the fastest. Use one that contains phenylephrine or oxymetazoline. First blow your nose gently. Then use the spray. Do not use these medicines more often than directed on the label. If you do, your symptoms may get worse. You may also take pills that contain pseudoephedrine. Don t use products that combine multiple medicines. This is because side effects may be increased. Read all medicine labels. You can also ask the pharmacist for help. (People with high blood pressure should not use decongestants. They can raise blood pressure.)  Over-the-counter antihistamines may help if allergies contributed to your sinusitis.    Use acetaminophen or ibuprofen to control pain, unless another pain medicine was prescribed to you. If you have chronic liver or kidney disease  or ever had a stomach ulcer, talk with your healthcare provider before using these medicines. (Aspirin should never be taken by anyone under age 18 who is ill with a fever. It may cause severe liver damage.)  Use nasal rinses or irrigation as instructed by your healthcare provider.  Don't smoke. This can make symptoms worse.  Follow-up care  Follow up with your healthcare provider or our staff if you are NOT better in 1 week.  When to seek medical advice  Call your healthcare provider if any of these occur:  Green or yellow fluid draining from your nose or into your throat  Facial pain or headache that gets worse  Stiff neck  Unusual drowsiness or confusion  Swelling of your forehead or eyelids  Vision problems, such as blurred or double vision  Fever of 100.4 F (38 C) or higher, or as directed by your healthcare provider  Seizure  Breathing problems  Symptoms that don't go away in 10 days  For informational purposes only. Not to replace the advice of your health care provider.  Copyright   2018 [employer] [facility]. All rights reserved.        Dear Lamberto Poole    After reviewing your responses, I've been able to diagnose you with Acute sinusitis, recurrence not specified, unspecified location.      It is also important to stay well hydrated, get lots of rest and take over-the-counter decongestants,?tylenol?or ibuprofen if you?are able to?take those medications per your primary care provider to help relieve discomfort.?       If your symptoms worsen, you develop severe headache, vomiting, high fever (>102), or are not improving in 7 days, please contact your primary care provider for an appointment or visit any of our convenient Walk-in Care or Urgent Care Centers to be seen which can be found on our website?here.?     Thanks again for choosing?us?as your health care partner,?   ?  LENORA Hernandez CNP?

## 2023-04-29 ENCOUNTER — HEALTH MAINTENANCE LETTER (OUTPATIENT)
Age: 28
End: 2023-04-29

## 2023-06-07 ENCOUNTER — OFFICE VISIT (OUTPATIENT)
Dept: FAMILY MEDICINE | Facility: OTHER | Age: 28
End: 2023-06-07
Attending: NURSE PRACTITIONER
Payer: COMMERCIAL

## 2023-06-07 VITALS
BODY MASS INDEX: 34.81 KG/M2 | RESPIRATION RATE: 16 BRPM | WEIGHT: 215.7 LBS | HEART RATE: 74 BPM | TEMPERATURE: 96.8 F | SYSTOLIC BLOOD PRESSURE: 130 MMHG | DIASTOLIC BLOOD PRESSURE: 92 MMHG | OXYGEN SATURATION: 97 %

## 2023-06-07 DIAGNOSIS — M72.2 PLANTAR FASCIITIS: ICD-10-CM

## 2023-06-07 DIAGNOSIS — F41.9 ANXIETY: Primary | ICD-10-CM

## 2023-06-07 DIAGNOSIS — F51.01 PRIMARY INSOMNIA: ICD-10-CM

## 2023-06-07 DIAGNOSIS — F41.0 PANIC ATTACK: ICD-10-CM

## 2023-06-07 PROCEDURE — 99214 OFFICE O/P EST MOD 30 MIN: CPT | Performed by: NURSE PRACTITIONER

## 2023-06-07 RX ORDER — SERTRALINE HYDROCHLORIDE 100 MG/1
200 TABLET, FILM COATED ORAL DAILY
Qty: 180 TABLET | Refills: 1 | Status: SHIPPED | OUTPATIENT
Start: 2023-06-07 | End: 2024-03-20

## 2023-06-07 RX ORDER — HYDROXYZINE HYDROCHLORIDE 25 MG/1
TABLET, FILM COATED ORAL
Qty: 30 TABLET | Refills: 1 | Status: CANCELLED | OUTPATIENT
Start: 2023-06-07

## 2023-06-07 RX ORDER — IBUPROFEN 800 MG/1
800 TABLET, FILM COATED ORAL EVERY 8 HOURS PRN
Qty: 90 TABLET | Refills: 1 | Status: SHIPPED | OUTPATIENT
Start: 2023-06-07 | End: 2024-02-28

## 2023-06-07 ASSESSMENT — ANXIETY QUESTIONNAIRES
7. FEELING AFRAID AS IF SOMETHING AWFUL MIGHT HAPPEN: NOT AT ALL
4. TROUBLE RELAXING: NOT AT ALL
2. NOT BEING ABLE TO STOP OR CONTROL WORRYING: NOT AT ALL
6. BECOMING EASILY ANNOYED OR IRRITABLE: MORE THAN HALF THE DAYS
IF YOU CHECKED OFF ANY PROBLEMS ON THIS QUESTIONNAIRE, HOW DIFFICULT HAVE THESE PROBLEMS MADE IT FOR YOU TO DO YOUR WORK, TAKE CARE OF THINGS AT HOME, OR GET ALONG WITH OTHER PEOPLE: SOMEWHAT DIFFICULT
GAD7 TOTAL SCORE: 4
3. WORRYING TOO MUCH ABOUT DIFFERENT THINGS: NOT AT ALL
GAD7 TOTAL SCORE: 4
5. BEING SO RESTLESS THAT IT IS HARD TO SIT STILL: SEVERAL DAYS
1. FEELING NERVOUS, ANXIOUS, OR ON EDGE: SEVERAL DAYS

## 2023-06-07 ASSESSMENT — PAIN SCALES - GENERAL: PAINLEVEL: NO PAIN (0)

## 2023-06-07 NOTE — PROGRESS NOTES
Assessment & Plan          Anxiety  - sertraline (ZOLOFT) 100 MG tablet; Take 2 tablets (200 mg) by mouth daily      Panic attack  - Hydroxyzine as needed      Primary insomnia  - Follow-up as needed      Plantar fasciitis  - ibuprofen (ADVIL/MOTRIN) 800 MG tablet; Take 1 tablet (800 mg) by mouth every 8 hours as needed for moderate pain        Return in about 6 months (around 12/7/2023).        Althea Barragan, CNP  [facility] - [facility] is a 28 year old, presenting for the following health issues:  Anxiety and Insomnia        Anxiety Follow-Up    How are you doing with your anxiety since your last visit? Improved     Are you having other symptoms that might be associated with anxiety? No    Have you had a significant life event? No     Are you feeling depressed? No    Do you have any concerns with your use of alcohol or other drugs? No      Social History     Tobacco Use     Smoking status: Never     Smokeless tobacco: Current     Tobacco comments:     pt denied QP 1/20/2020   Substance Use Topics     Alcohol use: No     Drug use: No             12/3/2021    11:00 AM 9/20/2022     9:52 AM 6/7/2023     3:00 PM   AZRA-7 SCORE   Total Score 8 4 4         11/2/2021     2:00 PM 12/3/2021    11:00 AM 9/20/2022     9:52 AM   PHQ   PHQ-9 Total Score 6 4 3   Q9: Thoughts of better off dead/self-harm past 2 weeks Not at all Not at all Not at all         9/20/2022     9:52 AM   Last PHQ-9   1.  Little interest or pleasure in doing things 0   2.  Feeling down, depressed, or hopeless 1   3.  Trouble falling or staying asleep, or sleeping too much 1   4.  Feeling tired or having little energy 0   5.  Poor appetite or overeating 1   6.  Feeling bad about yourself 0   7.  Trouble concentrating 0   8.  Moving slowly or restless 0   Q9: Thoughts of better off dead/self-harm past 2 weeks 0   PHQ-9 Total Score 3   Difficulty at work, home, or with people Somewhat difficult              6/7/2023     3:00 PM    AZRA-7    1. Feeling nervous, anxious, or on edge 1   2. Not being able to stop or control worrying 0   3. Worrying too much about different things 0   4. Trouble relaxing 0   5. Being so restless that it is hard to sit still 1   6. Becoming easily annoyed or irritable 2   7. Feeling afraid, as if something awful might happen 0   AZRA-7 Total Score 4   If you checked any problems, how difficult have they made it for you to do your work, take care of things at home, or get along with other people? Somewhat difficult         Insomnia  Onset/Duration: prior  Description:   Frequency of insomnia:  nightly  Time to fall asleep (sleep latency): 15 minutes  Middle of night awakening:  At times  Early morning awakening:  YES  Progression of Symptoms:  same  Accompanying Signs & Symptoms:  Daytime sleepiness/napping: No  Excessive snoring/apnea: No  Restless legs: No  Waking to urinate: No  Chronic pain:  No  Depression symptoms (if yes, do PHQ9): No  Anxiety symptoms (if yes, do AZRA-7): YES  History:  Prior Insomnia: YES  New stressful situation: No  Alleviating factors:  Self medicating (alcohol, etc.):  No  Stress-reduction (exercise, yoga, meditation etc): No  Therapies tried and outcome: diphenhydramine          Patient Active Problem List   Diagnosis     Chronic rhinitis     Anxiety     History of sinus tachycardia     ACP (advance care planning)     Primary insomnia     Panic attack     No past surgical history on file.    Social History     Tobacco Use     Smoking status: Never     Smokeless tobacco: Current     Tobacco comments:     pt denied QP 1/20/2020   Vaping Use     Vaping status: Not on file   Substance Use Topics     Alcohol use: No     Family History   Problem Relation Age of Onset     Hypertension Father      Heart Murmur Father              Current Outpatient Medications   Medication Sig Dispense Refill     hydrOXYzine (ATARAX) 25 MG tablet 1 po BID PRN panic attack 30 tablet 1     ibuprofen  (ADVIL/MOTRIN) 800 MG tablet Take 1 tablet (800 mg) by mouth every 8 hours as needed for moderate pain 90 tablet 1     sertraline (ZOLOFT) 100 MG tablet TAKE 2 TABLETS BY MOUTH EVERY  tablet 0         No Known Allergies         Recent Labs   Lab Test 08/30/22  1434 08/30/19  0941 05/03/17  1031   A1C 5.2  --   --    ALT 35  --   --    CR 1.08  --  0.91   GFRESTIMATED >90  --  >90  Non  GFR Calc     GFRESTBLACK  --   --  >90  African American GFR Calc     POTASSIUM 4.1  --  4.0   TSH 2.31 2.02 1.68          BP Readings from Last 3 Encounters:   06/07/23 (!) 130/92   09/20/22 126/74   08/30/22 124/78    Wt Readings from Last 3 Encounters:   06/07/23 97.8 kg (215 lb 11.2 oz)   09/20/22 95.9 kg (211 lb 6.4 oz)   08/30/22 95.7 kg (211 lb)                Review of Systems   Constitutional, HEENT, cardiovascular, pulmonary, gi and gu systems are negative, except as otherwise noted.          Objective    BP (!) 130/92 (BP Location: Left arm, Patient Position: Sitting, Cuff Size: Adult Regular)   Pulse 74   Temp 96.8  F (36  C) (Tympanic)   Resp 16   Wt 97.8 kg (215 lb 11.2 oz)   SpO2 97%   BMI 34.81 kg/m    Body mass index is 34.81 kg/m .         Physical Exam   GENERAL: healthy, alert and no distress  EYES: Eyes grossly normal to inspection, PERRL and conjunctivae and sclerae normal  HENT: ear canals and TM's normal, nose and mouth without ulcers or lesions  NECK: no adenopathy, no asymmetry, masses, or scars and thyroid normal to palpation  RESP: lungs clear to auscultation - no rales, rhonchi or wheezes  CV: regular rate and rhythm, normal S1 S2, no S3 or S4, no murmur, click or rub, no peripheral edema and peripheral pulses strong  SKIN: no suspicious lesions or rashes  PSYCH: mentation appears normal, affect normal/bright

## 2023-06-10 ENCOUNTER — TRANSFERRED RECORDS (OUTPATIENT)
Dept: HEALTH INFORMATION MANAGEMENT | Facility: HOSPITAL | Age: 28
End: 2023-06-10

## 2023-06-12 ENCOUNTER — MYC MEDICAL ADVICE (OUTPATIENT)
Dept: FAMILY MEDICINE | Facility: OTHER | Age: 28
End: 2023-06-12

## 2023-06-12 ENCOUNTER — TELEPHONE (OUTPATIENT)
Dept: FAMILY MEDICINE | Facility: OTHER | Age: 28
End: 2023-06-12

## 2023-06-12 NOTE — TELEPHONE ENCOUNTER
9:16 AM    Reason for Call: Phone Call    Description: Patient called in stating that he had ADHD testing and was wondering if Althea received them. Please call patient back.    Was an appointment offered for this call? No  If yes : Appointment type              Date    Preferred method for responding to this message: Telephone Call  What is your phone number ? [phone]    If we cannot reach you directly, may we leave a detailed response at the number you provided? Yes    Can this message wait until your PCP/provider returns, if available today? YES    Saumya Tanner

## 2023-06-16 ENCOUNTER — OFFICE VISIT (OUTPATIENT)
Dept: FAMILY MEDICINE | Facility: OTHER | Age: 28
End: 2023-06-16
Attending: NURSE PRACTITIONER
Payer: COMMERCIAL

## 2023-06-16 VITALS
OXYGEN SATURATION: 98 % | HEART RATE: 84 BPM | DIASTOLIC BLOOD PRESSURE: 78 MMHG | BODY MASS INDEX: 33.89 KG/M2 | TEMPERATURE: 97.3 F | SYSTOLIC BLOOD PRESSURE: 114 MMHG | RESPIRATION RATE: 16 BRPM | WEIGHT: 210 LBS

## 2023-06-16 DIAGNOSIS — F90.0 ATTENTION DEFICIT HYPERACTIVITY DISORDER (ADHD), PREDOMINANTLY INATTENTIVE TYPE: ICD-10-CM

## 2023-06-16 PROCEDURE — 99213 OFFICE O/P EST LOW 20 MIN: CPT | Performed by: NURSE PRACTITIONER

## 2023-06-16 RX ORDER — LISDEXAMFETAMINE DIMESYLATE 20 MG/1
20 CAPSULE ORAL EVERY MORNING
Qty: 30 CAPSULE | Refills: 0 | Status: SHIPPED | OUTPATIENT
Start: 2023-06-16 | End: 2023-12-05

## 2023-06-16 ASSESSMENT — PAIN SCALES - GENERAL: PAINLEVEL: NO PAIN (0)

## 2023-06-16 NOTE — LETTER
June 16, 2023      [facility] Poole  [address]  [place] [address]        To Whom It May Concern:    Lamberto Poole was seen in our clinic. He may return to work without restrictions 6/19/23.      Sincerely,        Althea Barragan, CNP

## 2023-06-16 NOTE — PATIENT INSTRUCTIONS
Assessment & Plan         Attention deficit hyperactivity disorder (ADHD), predominantly inattentive type  - lisdexamfetamine (VYVANSE) 20 MG capsule; Take 1 capsule (20 mg) by mouth every morning        Follow-up 1 month- mervin Barragan CNP  [facility] [place] [facility]

## 2023-06-16 NOTE — PROGRESS NOTES
Assessment & Plan         Attention deficit hyperactivity disorder (ADHD), predominantly inattentive type  - lisdexamfetamine (VYVANSE) 20 MG capsule; Take 1 capsule (20 mg) by mouth every morning        Follow-up 1 month- mervin Barragan CNP  [facility] - [place] is a 28 year old, presenting for the following health issues:  A.D.H.D        ADHD    Onset: prior     Description:   Easily distracted: YES  Short attention span: YES  Trouble following directions: YES   Impulsive behavior: YES   Trouble completing tasks: YES    Accompanying Signs & Symptoms:        Change in sleep pattern: no  Irritability at certain times of the day: YES  Socially withdrawn: YES  Depression symptoms: No  Anxiety symptoms: YES    History:  Caffeine intake: None  Loss of appetite: No  Healthy diet: No  Did you have problems in school or with previous employment: YES  Family history of ADHD: No  Have you had an evaluation for ADHD in the past: YES  Do you use alcohol or drugs: No    Therapies tried: none         He had ADD testing completed and testing indicates the presence of ADD without hyperactivity        Patient Active Problem List   Diagnosis     Chronic rhinitis     Anxiety     History of sinus tachycardia     ACP (advance care planning)     Primary insomnia     Panic attack     Attention deficit hyperactivity disorder (ADHD), predominantly inattentive type     No past surgical history on file.    Social History     Tobacco Use     Smoking status: Never     Smokeless tobacco: Current     Tobacco comments:     pt denied QP 1/20/2020   Vaping Use     Vaping status: Never Used   Substance Use Topics     Alcohol use: No     Family History   Problem Relation Age of Onset     Hypertension Father      Heart Murmur Father            Current Outpatient Medications   Medication Sig Dispense Refill     lisdexamfetamine (VYVANSE) 20 MG capsule Take 1 capsule (20 mg) by mouth every morning 30 capsule 0      hydrOXYzine (ATARAX) 25 MG tablet 1 po BID PRN panic attack 30 tablet 1     ibuprofen (ADVIL/MOTRIN) 800 MG tablet Take 1 tablet (800 mg) by mouth every 8 hours as needed for moderate pain 90 tablet 1     sertraline (ZOLOFT) 100 MG tablet Take 2 tablets (200 mg) by mouth daily 180 tablet 1         No Known Allergies         Recent Labs   Lab Test 08/30/22  1434 08/30/19  0941 05/03/17  1031   A1C 5.2  --   --    ALT 35  --   --    CR 1.08  --  0.91   GFRESTIMATED >90  --  >90  Non  GFR Calc     GFRESTBLACK  --   --  >90  African American GFR Calc     POTASSIUM 4.1  --  4.0   TSH 2.31 2.02 1.68          BP Readings from Last 3 Encounters:   06/16/23 114/78   06/07/23 (!) 130/92   09/20/22 126/74    Wt Readings from Last 3 Encounters:   06/16/23 95.3 kg (210 lb)   06/07/23 97.8 kg (215 lb 11.2 oz)   09/20/22 95.9 kg (211 lb 6.4 oz)                Review of Systems   Constitutional, HEENT, cardiovascular, pulmonary, gi and gu systems are negative, except as otherwise noted.          Objective    /78 (BP Location: Left arm, Patient Position: Sitting, Cuff Size: Adult Large)   Pulse 84   Temp 97.3  F (36.3  C) (Tympanic)   Resp 16   Wt 95.3 kg (210 lb)   SpO2 98%   BMI 33.89 kg/m    Body mass index is 33.89 kg/m .         Physical Exam   GENERAL: healthy, alert and no distress  NECK: no adenopathy, no asymmetry, masses, or scars and thyroid normal to palpation  RESP: lungs clear to auscultation - no rales, rhonchi or wheezes  CV: regular rate and rhythm, normal S1 S2, no S3 or S4, no murmur, click or rub, no peripheral edema and peripheral pulses strong  MS: no gross musculoskeletal defects noted, no edema  SKIN: no suspicious lesions or rashes  PSYCH: mentation appears normal, affect normal/bright

## 2023-08-30 ENCOUNTER — E-VISIT (OUTPATIENT)
Dept: URGENT CARE | Facility: CLINIC | Age: 28
End: 2023-08-30
Payer: COMMERCIAL

## 2023-08-30 DIAGNOSIS — Z53.9 DIAGNOSIS NOT YET DEFINED: Primary | ICD-10-CM

## 2023-08-30 NOTE — PATIENT INSTRUCTIONS
Dear Lamberto Poole,    We are sorry you are not feeling well. Based on the responses you provided, it is recommended that you be seen in-person in urgent care so we can better evaluate your symptoms. Please click here to find the nearest urgent care location to you.   You will not be charged for this Visit. Thank you for trusting us with your care.    Diane Al PA-C

## 2023-11-16 ENCOUNTER — E-VISIT (OUTPATIENT)
Dept: URGENT CARE | Facility: CLINIC | Age: 28
End: 2023-11-16
Payer: COMMERCIAL

## 2023-11-16 DIAGNOSIS — B30.9 VIRAL CONJUNCTIVITIS: Primary | ICD-10-CM

## 2023-11-16 PROCEDURE — 99421 OL DIG E/M SVC 5-10 MIN: CPT | Performed by: PHYSICIAN ASSISTANT

## 2023-11-16 RX ORDER — CIPROFLOXACIN HYDROCHLORIDE 3.5 MG/ML
SOLUTION/ DROPS TOPICAL
Qty: 5 ML | Refills: 0 | Status: SHIPPED | OUTPATIENT
Start: 2023-11-16 | End: 2023-11-23

## 2023-11-16 NOTE — PATIENT INSTRUCTIONS
Lamberto Poole,    Your photo and description of symptoms are consistent with pink eye caused by a virus. You did note that you have pain in your eye. I am assuming this is mild and uncomfortable and not severe pain. If my assumption is not correct and you have significant pain in your eye, please disregard the advice below as you need to be seen in by your eye doctor or in the emergency room right away.      This can cause redness, irritation and itching in your eye as well as tearing and intermediate thickened discharge. Your eyes may even be stuck shut when when you wake up in the morning. Your eyelids may also become swollen. You'll be contagious while you have tearing and crusting in your eyes, generally 7-10 days. Wash your hands frequently and avoid touching your eyes to prevent spreading to others. You may use over the counter lubricating eye drops to help ease your symptoms. Allergy eye drops such as Patanol (olopatadine) or Zaditor (ketotifen) will help to control the itching. Avoid redness reducing eye drops for an extended period of time as these can cause a rebound and make the redness and irritation return when you stop using the drops. Cool packs on your eyes can help with irritation and swelling.     Antibiotic drops will not make a virus go away any faster and will not make you less contagious. However, if you notice that there is thick yellow or green discharge that is consistently draining from your eye (you're wiping it away every few minutes) then an antibiotic drop will help. I have sent a prescription to the pharmacy for you. Follow the instructions on the medication.    If your symptoms are getting worse or not improving by the 10th day of symptoms, be seen in person for further evaluation.    You'll be feeling better soon!    Nichole Martins PA-C  [facility]    Pinkeye: Care Instructions  Overview     Pinkeye is redness and swelling of the eye surface and the conjunctiva  (the lining of the eyelid and the covering of the white part of the eye). Pinkeye is also called conjunctivitis. Pinkeye is often caused by infection with bacteria or a virus. Dry air, allergies, smoke, and chemicals are other common causes.  Pinkeye often gets better on its own in 7 to 10 days. Antibiotics only help if the pinkeye is caused by bacteria. Pinkeye caused by infection spreads easily. If an allergy or chemical is causing pinkeye, it will not go away unless you can avoid whatever is causing it.  Follow-up care is a key part of your treatment and safety. Be sure to make and go to all appointments, and call your doctor if you are having problems. It's also a good idea to know your test results and keep a list of the medicines you take.  How can you care for yourself at home?  Wash your hands often. Always wash them before and after you treat pinkeye or touch your eyes or face.  Use moist cotton or a clean, wet cloth to remove crust. Wipe from the inside corner of the eye to the outside. Use a clean part of the cloth for each wipe.  Put cold or warm wet cloths on your eye a few times a day if the eye hurts.  Do not wear contact lenses or eye makeup until the pinkeye is gone. Throw away any eye makeup you were using when you got pinkeye. Clean your contacts and storage case. If you wear disposable contacts, use a new pair when your eye has cleared and it is safe to wear contacts again.  If the doctor gave you antibiotic ointment or eyedrops, use them as directed. Use the medicine for as long as instructed, even if your eye starts looking better soon. Keep the bottle tip clean, and do not let it touch the eye area.  To put in eyedrops or ointment:  Tilt your head back, and pull your lower eyelid down with one finger.  Drop or squirt the medicine inside the lower lid.  Close your eye for 30 to 60 seconds to let the drops or ointment move around.  Do not touch the ointment or dropper tip to your eyelashes or  "any other surface.  Do not share towels, pillows, or washcloths while you have pinkeye.  When should you call for help?   Call your doctor now or seek immediate medical care if:    You have pain in your eye, not just irritation on the surface.     You have a change in vision or loss of vision.     You have an increase in discharge from the eye.     Your eye has not started to improve or begins to get worse within 48 hours after you start using antibiotics.     Pinkeye lasts longer than 7 days.   Watch closely for changes in your health, and be sure to contact your doctor if you have any problems.  Where can you learn more?  Go to https://www.[employer].net/patiented  Enter Y392 in the search box to learn more about \"Pinkeye: Care Instructions.\"  Current as of: June 6, 2023               Content Version: 13.8    6123-0582 [employer].   Care instructions adapted under license by your healthcare professional. If you have questions about a medical condition or this instruction, always ask your healthcare professional. [employer] disclaims any warranty or liability for your use of this information.      "

## 2023-11-30 NOTE — PROGRESS NOTES
Assessment & Plan       1. Anxiety  - hydrOXYzine HCl (ATARAX) 25 MG tablet; 1 po BID PRN panic attack  Dispense: 30 tablet; Refill: 1      2. Attention deficit hyperactivity disorder (ADHD), predominantly inattentive type  - Follow-up as needed      3. Panic attack  - hydrOXYzine HCl (ATARAX) 25 MG tablet; 1 po BID PRN panic attack  Dispense: 30 tablet; Refill: 1         Please continue to take all medication as directed  Please notify your pharmacy or our refill line of future refills needed.  Please return sooner than your next scheduled appointment with any concerns.        Return in about 6 months (around 6/5/2024).        Althea Barragan CNP  [facility] - [place] is a 28 year old, presenting for the following health issues:  A.D.H.D and Anxiety        ADHD  Onset: prior   Description:   Easily distracted: YES  Short attention span: YES  Trouble following directions: YES   Impulsive behavior: YES   Trouble completing tasks: YES  Accompanying Signs & Symptoms:        Change in sleep pattern: no  Irritability at certain times of the day: YES  Socially withdrawn: YES  Depression symptoms: No  Anxiety symptoms: YES  History:  Caffeine intake: None  Loss of appetite: No  Healthy diet: No  Did you have problems in school or with previous employment: YES  Family history of ADHD: No  Have you had an evaluation for ADHD in the past: YES  Do you use alcohol or drugs: No  Therapies tried: was taking vyvanse but stopped due to headaches, did not seem to help        Anxiety Follow-Up  How are you doing with your anxiety since your last visit? Improved   Are you having other symptoms that might be associated with anxiety? No  Have you had a significant life event? No   Are you feeling depressed? No  Do you have any concerns with your use of alcohol or other drugs? No        Social History     Tobacco Use    Smoking status: Never    Smokeless tobacco: Current    Tobacco comments:     pt denied QP  1/20/2020   Vaping Use    Vaping Use: Never used   Substance Use Topics    Alcohol use: No    Drug use: No             9/20/2022     9:52 AM 6/7/2023     3:00 PM 12/5/2023     8:57 AM   AZRA-7 SCORE   Total Score   4 (minimal anxiety)   Total Score 4 4 4             12/3/2021    11:00 AM 9/20/2022     9:52 AM 12/5/2023     8:57 AM   PHQ   PHQ-9 Total Score 4 3 3   Q9: Thoughts of better off dead/self-harm past 2 weeks Not at all Not at all Not at all             12/5/2023     8:57 AM   Last PHQ-9   1.  Little interest or pleasure in doing things 0   2.  Feeling down, depressed, or hopeless 1   3.  Trouble falling or staying asleep, or sleeping too much 1   4.  Feeling tired or having little energy 0   5.  Poor appetite or overeating 0   6.  Feeling bad about yourself 1   7.  Trouble concentrating 0   8.  Moving slowly or restless 0   Q9: Thoughts of better off dead/self-harm past 2 weeks 0   PHQ-9 Total Score 3             12/5/2023     8:57 AM   AZRA-7    1. Feeling nervous, anxious, or on edge 1   2. Not being able to stop or control worrying 0   3. Worrying too much about different things 1   4. Trouble relaxing 0   5. Being so restless that it is hard to sit still 0   6. Becoming easily annoyed or irritable 1   7. Feeling afraid, as if something awful might happen 1   AZRA-7 Total Score 4   If you checked any problems, how difficult have they made it for you to do your work, take care of things at home, or get along with other people? Somewhat difficult           Patient Active Problem List   Diagnosis    Chronic rhinitis    Anxiety    History of sinus tachycardia    ACP (advance care planning)    Primary insomnia    Panic attack    Attention deficit hyperactivity disorder (ADHD), predominantly inattentive type     No past surgical history on file.    Social History     Tobacco Use    Smoking status: Never    Smokeless tobacco: Current    Tobacco comments:     pt denied QP 1/20/2020   Substance Use Topics     Alcohol use: No     Family History   Problem Relation Age of Onset    Hypertension Father     Heart Murmur Father              Current Outpatient Medications   Medication Sig Dispense Refill    ibuprofen (ADVIL/MOTRIN) 800 MG tablet Take 1 tablet (800 mg) by mouth every 8 hours as needed for moderate pain 90 tablet 1    sertraline (ZOLOFT) 100 MG tablet Take 2 tablets (200 mg) by mouth daily 180 tablet 1    hydrOXYzine HCl (ATARAX) 25 MG tablet 1 po BID PRN panic attack 30 tablet 1         No Known Allergies        Recent Labs   Lab Test 08/30/22  1434 08/30/19  0941 05/03/17  1031   A1C 5.2  --   --    ALT 35  --   --    CR 1.08  --  0.91   GFRESTIMATED >90  --  >90  Non  GFR Calc     GFRESTBLACK  --   --  >90  African American GFR Calc     POTASSIUM 4.1  --  4.0   TSH 2.31 2.02 1.68          BP Readings from Last 3 Encounters:   12/05/23 138/82   06/16/23 114/78   06/07/23 (!) 130/92    Wt Readings from Last 3 Encounters:   12/05/23 90.5 kg (199 lb 8 oz)   06/16/23 95.3 kg (210 lb)   06/07/23 97.8 kg (215 lb 11.2 oz)             Review of Systems   Constitutional, HEENT, cardiovascular, pulmonary, gi and gu systems are negative, except as otherwise noted.            Objective    /82 (BP Location: Right arm, Patient Position: Sitting, Cuff Size: Adult Large)   Pulse 85   Temp 97.6  F (36.4  C) (Tympanic)   Resp 18   Wt 90.5 kg (199 lb 8 oz)   SpO2 98%   BMI 32.20 kg/m    Body mass index is 32.2 kg/m .          Physical Exam   GENERAL: healthy, alert and no distress  EYES: Eyes grossly normal to inspection, PERRL and conjunctivae and sclerae normal  HENT: ear canals and TM's normal, nose and mouth without ulcers or lesions  NECK: no adenopathy, no asymmetry, masses, or scars and thyroid normal to palpation  RESP: lungs clear to auscultation - no rales, rhonchi or wheezes  CV: regular rate and rhythm, normal S1 S2, no S3 or S4, no murmur, click or rub, no peripheral edema and peripheral  pulses strong  PSYCH: mentation appears normal, affect normal/bright

## 2023-12-05 ENCOUNTER — OFFICE VISIT (OUTPATIENT)
Dept: FAMILY MEDICINE | Facility: OTHER | Age: 28
End: 2023-12-05
Attending: NURSE PRACTITIONER
Payer: COMMERCIAL

## 2023-12-05 VITALS
BODY MASS INDEX: 32.2 KG/M2 | WEIGHT: 199.5 LBS | RESPIRATION RATE: 18 BRPM | HEART RATE: 85 BPM | SYSTOLIC BLOOD PRESSURE: 138 MMHG | OXYGEN SATURATION: 98 % | DIASTOLIC BLOOD PRESSURE: 82 MMHG | TEMPERATURE: 97.6 F

## 2023-12-05 DIAGNOSIS — F41.9 ANXIETY: Primary | ICD-10-CM

## 2023-12-05 DIAGNOSIS — F41.0 PANIC ATTACK: ICD-10-CM

## 2023-12-05 DIAGNOSIS — F90.0 ATTENTION DEFICIT HYPERACTIVITY DISORDER (ADHD), PREDOMINANTLY INATTENTIVE TYPE: ICD-10-CM

## 2023-12-05 PROCEDURE — 99214 OFFICE O/P EST MOD 30 MIN: CPT | Performed by: NURSE PRACTITIONER

## 2023-12-05 RX ORDER — HYDROXYZINE HYDROCHLORIDE 25 MG/1
TABLET, FILM COATED ORAL
Qty: 30 TABLET | Refills: 1 | Status: SHIPPED | OUTPATIENT
Start: 2023-12-05

## 2023-12-05 ASSESSMENT — ANXIETY QUESTIONNAIRES
2. NOT BEING ABLE TO STOP OR CONTROL WORRYING: NOT AT ALL
6. BECOMING EASILY ANNOYED OR IRRITABLE: SEVERAL DAYS
IF YOU CHECKED OFF ANY PROBLEMS ON THIS QUESTIONNAIRE, HOW DIFFICULT HAVE THESE PROBLEMS MADE IT FOR YOU TO DO YOUR WORK, TAKE CARE OF THINGS AT HOME, OR GET ALONG WITH OTHER PEOPLE: SOMEWHAT DIFFICULT
GAD7 TOTAL SCORE: 4
GAD7 TOTAL SCORE: 4
4. TROUBLE RELAXING: NOT AT ALL
3. WORRYING TOO MUCH ABOUT DIFFERENT THINGS: SEVERAL DAYS
7. FEELING AFRAID AS IF SOMETHING AWFUL MIGHT HAPPEN: SEVERAL DAYS
1. FEELING NERVOUS, ANXIOUS, OR ON EDGE: SEVERAL DAYS
5. BEING SO RESTLESS THAT IT IS HARD TO SIT STILL: NOT AT ALL

## 2023-12-05 ASSESSMENT — PATIENT HEALTH QUESTIONNAIRE - PHQ9
SUM OF ALL RESPONSES TO PHQ QUESTIONS 1-9: 3
SUM OF ALL RESPONSES TO PHQ QUESTIONS 1-9: 3
10. IF YOU CHECKED OFF ANY PROBLEMS, HOW DIFFICULT HAVE THESE PROBLEMS MADE IT FOR YOU TO DO YOUR WORK, TAKE CARE OF THINGS AT HOME, OR GET ALONG WITH OTHER PEOPLE: SOMEWHAT DIFFICULT

## 2023-12-05 ASSESSMENT — PAIN SCALES - GENERAL: PAINLEVEL: NO PAIN (0)

## 2023-12-05 NOTE — PATIENT INSTRUCTIONS
Assessment & Plan       1. Anxiety  - hydrOXYzine HCl (ATARAX) 25 MG tablet; 1 po BID PRN panic attack  Dispense: 30 tablet; Refill: 1      2. Attention deficit hyperactivity disorder (ADHD), predominantly inattentive type  - Follow-up as needed      3. Panic attack  - hydrOXYzine HCl (ATARAX) 25 MG tablet; 1 po BID PRN panic attack  Dispense: 30 tablet; Refill: 1         Please continue to take all medication as directed  Please notify your pharmacy or our refill line of future refills needed.  Please return sooner than your next scheduled appointment with any concerns.        Return in about 6 months (around 6/5/2024).        Althea Barragan CNP  [facility]

## 2024-01-14 ENCOUNTER — E-VISIT (OUTPATIENT)
Dept: URGENT CARE | Facility: CLINIC | Age: 29
End: 2024-01-14
Payer: COMMERCIAL

## 2024-01-14 DIAGNOSIS — B30.9 VIRAL CONJUNCTIVITIS: Primary | ICD-10-CM

## 2024-01-14 PROCEDURE — 99421 OL DIG E/M SVC 5-10 MIN: CPT | Performed by: PREVENTIVE MEDICINE

## 2024-01-14 RX ORDER — KETOTIFEN FUMARATE 0.35 MG/ML
SOLUTION/ DROPS OPHTHALMIC
Qty: 5 ML | Refills: 0 | Status: SHIPPED | OUTPATIENT
Start: 2024-01-14 | End: 2024-06-07

## 2024-01-14 NOTE — PATIENT INSTRUCTIONS
Thank you for choosing us for your care. I have placed an order for a prescription so that you can start treatment. View your full visit summary for details by clicking on the link below. Your pharmacist will able to address any questions you may have about the medication.     If you re not feeling better within 2-3 days, please schedule an appointment.  You can schedule an appointment right here in [facility], or call [phone]  If the visit is for the same symptoms as your eVisit, we ll refund the cost of your eVisit if seen within seven days.    Pinkeye: Care Instructions  Overview     Pinkeye is redness and swelling of the eye surface and the conjunctiva (the lining of the eyelid and the covering of the white part of the eye). Pinkeye is also called conjunctivitis. Pinkeye is often caused by infection with bacteria or a virus. Dry air, allergies, smoke, and chemicals are other common causes.  Pinkeye often gets better on its own in 7 to 10 days. Antibiotics only help if the pinkeye is caused by bacteria. Pinkeye caused by infection spreads easily. If an allergy or chemical is causing pinkeye, it will not go away unless you can avoid whatever is causing it.  Follow-up care is a key part of your treatment and safety. Be sure to make and go to all appointments, and call your doctor if you are having problems. It's also a good idea to know your test results and keep a list of the medicines you take.  How can you care for yourself at home?  Wash your hands often. Always wash them before and after you treat pinkeye or touch your eyes or face.  Use moist cotton or a clean, wet cloth to remove crust. Wipe from the inside corner of the eye to the outside. Use a clean part of the cloth for each wipe.  Put cold or warm wet cloths on your eye a few times a day if the eye hurts.  Do not wear contact lenses or eye makeup until the pinkeye is gone. Throw away any eye makeup you were using when you got pinkeye. Clean your  "contacts and storage case. If you wear disposable contacts, use a new pair when your eye has cleared and it is safe to wear contacts again.  If the doctor gave you antibiotic ointment or eyedrops, use them as directed. Use the medicine for as long as instructed, even if your eye starts looking better soon. Keep the bottle tip clean, and do not let it touch the eye area.  To put in eyedrops or ointment:  Tilt your head back, and pull your lower eyelid down with one finger.  Drop or squirt the medicine inside the lower lid.  Close your eye for 30 to 60 seconds to let the drops or ointment move around.  Do not touch the ointment or dropper tip to your eyelashes or any other surface.  Do not share towels, pillows, or washcloths while you have pinkeye.  When should you call for help?   Call your doctor now or seek immediate medical care if:    You have pain in your eye, not just irritation on the surface.     You have a change in vision or loss of vision.     You have an increase in discharge from the eye.     Your eye has not started to improve or begins to get worse within 48 hours after you start using antibiotics.     Pinkeye lasts longer than 7 days.   Watch closely for changes in your health, and be sure to contact your doctor if you have any problems.  Where can you learn more?  Go to https://www.[employer].net/patiented  Enter Y392 in the search box to learn more about \"Pinkeye: Care Instructions.\"  Current as of: June 6, 2023               Content Version: 13.8    4332-6308 [employer].   Care instructions adapted under license by your healthcare professional. If you have questions about a medical condition or this instruction, always ask your healthcare professional. [employer] disclaims any warranty or liability for your use of this information.      "

## 2024-02-28 ENCOUNTER — E-VISIT (OUTPATIENT)
Dept: URGENT CARE | Facility: CLINIC | Age: 29
End: 2024-02-28
Payer: MEDICAID

## 2024-02-28 ENCOUNTER — OFFICE VISIT (OUTPATIENT)
Dept: FAMILY MEDICINE | Facility: OTHER | Age: 29
End: 2024-02-28
Attending: NURSE PRACTITIONER
Payer: COMMERCIAL

## 2024-02-28 VITALS
TEMPERATURE: 97.2 F | OXYGEN SATURATION: 98 % | RESPIRATION RATE: 18 BRPM | DIASTOLIC BLOOD PRESSURE: 86 MMHG | BODY MASS INDEX: 30.99 KG/M2 | SYSTOLIC BLOOD PRESSURE: 126 MMHG | HEART RATE: 108 BPM | WEIGHT: 192 LBS

## 2024-02-28 DIAGNOSIS — M72.2 PLANTAR FASCIITIS: ICD-10-CM

## 2024-02-28 DIAGNOSIS — K52.9 GASTROENTERITIS: Primary | ICD-10-CM

## 2024-02-28 DIAGNOSIS — R19.7 DIARRHEA, UNSPECIFIED TYPE: Primary | ICD-10-CM

## 2024-02-28 PROCEDURE — 99207 PR NON-BILLABLE SERV PER CHARTING: CPT | Performed by: NURSE PRACTITIONER

## 2024-02-28 PROCEDURE — 99213 OFFICE O/P EST LOW 20 MIN: CPT | Performed by: NURSE PRACTITIONER

## 2024-02-28 RX ORDER — IBUPROFEN 800 MG/1
800 TABLET, FILM COATED ORAL EVERY 8 HOURS PRN
Qty: 90 TABLET | Refills: 1 | Status: SHIPPED | OUTPATIENT
Start: 2024-02-28

## 2024-02-28 ASSESSMENT — PAIN SCALES - GENERAL: PAINLEVEL: NO PAIN (0)

## 2024-02-28 NOTE — PROGRESS NOTES
Assessment & Plan     Gastroenteritis  Supportive care discussed. Hydrate and include electrolytes such as Gatorade/poweraid.  Control fever with acetaminophen and/or ibuprofen over the counter. Verbalizes understanding of when to return for re-evaluation vs when to go into ER/ED.    Plantar fasciitis  Refill only.   - ibuprofen (ADVIL/MOTRIN) 800 MG tablet; Take 1 tablet (800 mg) by mouth every 8 hours as needed for moderate pain    No LOS data to display   Time spent by me doing chart review, history and exam, documentation and further activities per the note          No follow-ups on file.    Jeffery Orantes is a 28 year old, presenting for the following health issues:  Diarrhea and Vomiting    HPI       Covid test at home - negative on 2/27/24    Acute Illness  Acute illness concerns: Started Monday with diarrhea and vomiting along with abdominal cramping.   Onset/Duration: x 2 days   Symptoms:  Fever: No  Chills/Sweats: No  Headache (location?): No  Sinus Pressure: No  Conjunctivitis:  No  Ear Pain: no  Rhinorrhea: No  Congestion: No  Sore Throat: No  Cough: no  Wheeze: No  Decreased Appetite: No  Nausea: YES  Vomiting: YES  Diarrhea: YES  Dysuria/Freq.: No  Dysuria or Hematuria: No  Fatigue/Achiness: YES  Sick/Strep Exposure: YES- Grandmother tested positive for Norovirus x 1 week ago . Patient was exposed to Grandmother.   Therapies tried and outcome: hydration, pepto bismol        Objective    /86 (BP Location: Right arm, Patient Position: Sitting, Cuff Size: Adult Regular)   Pulse 108   Temp 97.2  F (36.2  C) (Tympanic)   Resp 18   Wt 87.1 kg (192 lb)   SpO2 98%   BMI 30.99 kg/m    Body mass index is 30.99 kg/m .      Physical Exam   GENERAL: alert and no distress  EYES: Eyes grossly normal to inspection, PERRL and conjunctivae and sclerae normal  RESP: lungs clear to auscultation - no rales, rhonchi or wheezes  CV: regular rate and rhythm, normal S1 S2, no S3 or S4, no murmur, click or  rub, no peripheral edema   ABDOMEN: soft, nontender, no hepatosplenomegaly, no masses and bowel sounds normal  NEURO: Normal strength and tone, mentation intact and speech normal  PSYCH: mentation appears normal, affect normal/bright            Signed Electronically by: Roxanne Us, CNP

## 2024-02-28 NOTE — PATIENT INSTRUCTIONS
Dear Lamberto Poole,    We are sorry you are not feeling well. Based on the responses you provided, it is recommended that you be seen in-person in urgent care so we can better evaluate your symptoms. Please click here to find the nearest urgent care location to you.   You will not be charged for this Visit. Thank you for trusting us with your care.    LENORA Hernandez CNP

## 2024-02-28 NOTE — LETTER
February 28, 2024      Lamberto Poole  [address] [place] [address]        To Whom It May Concern:    Lamberto Poole  was seen on 2/28/24.  Please excuse from any missed work from Monday 2/26/24-2/28/24 due to illness.        Sincerely,        Roxanne Us, CNP

## 2024-03-05 ENCOUNTER — E-VISIT (OUTPATIENT)
Dept: URGENT CARE | Facility: CLINIC | Age: 29
End: 2024-03-05
Payer: COMMERCIAL

## 2024-03-05 DIAGNOSIS — H10.30 ACUTE BACTERIAL CONJUNCTIVITIS, UNSPECIFIED LATERALITY: Primary | ICD-10-CM

## 2024-03-05 PROCEDURE — 99421 OL DIG E/M SVC 5-10 MIN: CPT | Performed by: NURSE PRACTITIONER

## 2024-03-05 RX ORDER — POLYMYXIN B SULFATE AND TRIMETHOPRIM 1; 10000 MG/ML; [USP'U]/ML
SOLUTION OPHTHALMIC
Qty: 10 ML | Refills: 0 | Status: SHIPPED | OUTPATIENT
Start: 2024-03-05 | End: 2024-03-12

## 2024-03-06 NOTE — PATIENT INSTRUCTIONS
Thank you for choosing us for your care. I have placed an order for a prescription so that you can start treatment. View your full visit summary for details by clicking on the link below. Your pharmacist will able to address any questions you may have about the medication.     If you re not feeling better within 2-3 days, please schedule an appointment.  You can schedule an appointment right here in [facility], or call [phone]  If the visit is for the same symptoms as your eVisit, we ll refund the cost of your eVisit if seen within seven days.    Pinkeye: Care Instructions  Overview     Pinkeye is redness and swelling of the eye surface and the conjunctiva (the lining of the eyelid and the covering of the white part of the eye). Pinkeye is also called conjunctivitis. Pinkeye is often caused by infection with bacteria or a virus. Dry air, allergies, smoke, and chemicals are other common causes.  Pinkeye often gets better on its own in 7 to 10 days. Antibiotics only help if the pinkeye is caused by bacteria. Pinkeye caused by infection spreads easily. If an allergy or chemical is causing pinkeye, it will not go away unless you can avoid whatever is causing it.  Follow-up care is a key part of your treatment and safety. Be sure to make and go to all appointments, and call your doctor if you are having problems. It's also a good idea to know your test results and keep a list of the medicines you take.  How can you care for yourself at home?  Wash your hands often. Always wash them before and after you treat pinkeye or touch your eyes or face.  Use moist cotton or a clean, wet cloth to remove crust. Wipe from the inside corner of the eye to the outside. Use a clean part of the cloth for each wipe.  Put cold or warm wet cloths on your eye a few times a day if the eye hurts.  Do not wear contact lenses or eye makeup until the pinkeye is gone. Throw away any eye makeup you were using when you got pinkeye. Clean your  "contacts and storage case. If you wear disposable contacts, use a new pair when your eye has cleared and it is safe to wear contacts again.  If the doctor gave you antibiotic ointment or eyedrops, use them as directed. Use the medicine for as long as instructed, even if your eye starts looking better soon. Keep the bottle tip clean, and do not let it touch the eye area.  To put in eyedrops or ointment:  Tilt your head back, and pull your lower eyelid down with one finger.  Drop or squirt the medicine inside the lower lid.  Close your eye for 30 to 60 seconds to let the drops or ointment move around.  Do not touch the ointment or dropper tip to your eyelashes or any other surface.  Do not share towels, pillows, or washcloths while you have pinkeye.  When should you call for help?   Call your doctor now or seek immediate medical care if:    You have pain in your eye, not just irritation on the surface.     You have a change in vision or loss of vision.     You have an increase in discharge from the eye.     Your eye has not started to improve or begins to get worse within 48 hours after you start using antibiotics.     Pinkeye lasts longer than 7 days.   Watch closely for changes in your health, and be sure to contact your doctor if you have any problems.  Where can you learn more?  Go to https://www."[email or web address].net/patiented  Enter Y392 in the search box to learn more about \"Pinkeye: Care Instructions.\"  Current as of: June 6, 2023               Content Version: 13.8    6219-2108 [employer].   Care instructions adapted under license by your healthcare professional. If you have questions about a medical condition or this instruction, always ask your healthcare professional. [employer] disclaims any warranty or liability for your use of this information.      "

## 2024-03-15 ENCOUNTER — E-VISIT (OUTPATIENT)
Dept: URGENT CARE | Facility: CLINIC | Age: 29
End: 2024-03-15
Payer: COMMERCIAL

## 2024-03-15 DIAGNOSIS — J01.90 ACUTE SINUSITIS WITH SYMPTOMS > 10 DAYS: Primary | ICD-10-CM

## 2024-03-15 PROCEDURE — 99421 OL DIG E/M SVC 5-10 MIN: CPT | Performed by: NURSE PRACTITIONER

## 2024-03-15 NOTE — PATIENT INSTRUCTIONS
Acute Sinusitis: Care Instructions  Overview     Acute sinusitis is an inflammation of the mucous membranes inside the nose and sinuses. Sinuses are the hollow spaces in your skull around the eyes and nose. Acute sinusitis often follows a cold. Acute sinusitis causes thick, discolored mucus that drains from the nose or down the back of the throat. It also can cause pain and pressure in your head and face along with a stuffy or blocked nose.  In most cases, sinusitis gets better on its own in 1 to 2 weeks. But some mild symptoms may last for several weeks. Sometimes antibiotics are needed if there is a bacterial infection.  Follow-up care is a key part of your treatment and safety. Be sure to make and go to all appointments, and call your doctor if you are having problems. It's also a good idea to know your test results and keep a list of the medicines you take.  How can you care for yourself at home?  Use saline (saltwater) nasal washes. This can help keep your nasal passages open and wash out mucus and allergens.  You can buy saline nose washes at a grocery store or drugstore. Follow the instructions on the package.  You can make your own at home. Add 1 teaspoon of non-iodized salt and 1 teaspoon of baking soda to 2 cups of distilled or boiled and cooled water. Fill a squeeze bottle or a nasal cleansing pot (such as a neti pot) with the nasal wash. Then put the tip into your nostril, and lean over the sink. With your mouth open, gently squirt the liquid. Repeat on the other side.  Try a decongestant nasal spray like oxymetazoline (Afrin). Do not use it for more than 3 days in a row. Using it for more than 3 days can make your congestion worse.  If needed, take an over-the-counter pain medicine, such as acetaminophen (Tylenol), ibuprofen (Advil, Motrin), or naproxen (Aleve). Read and follow all instructions on the label.  If the doctor prescribed antibiotics, take them as directed. Do not stop taking them just  "because you feel better. You need to take the full course of antibiotics.  Be careful when taking over-the-counter cold or flu medicines and Tylenol at the same time. Many of these medicines have acetaminophen, which is Tylenol. Read the labels to make sure that you are not taking more than the recommended dose. Too much acetaminophen (Tylenol) can be harmful.  Try a steroid nasal spray. It may help with your symptoms.  Breathe warm, moist air. You can use a steamy shower, a hot bath, or a sink filled with hot water. Avoid cold, dry air. Using a humidifier in your home may help. Follow the directions for cleaning the machine.  When should you call for help?   Call your doctor now or seek immediate medical care if:    You have new or worse swelling, redness, or pain in your face or around one or both of your eyes.     You have double vision or a change in your vision.     You have a high fever.     You have a severe headache and a stiff neck.     You have mental changes, such as feeling confused or much less alert.   Watch closely for changes in your health, and be sure to contact your doctor if:    You are not getting better as expected.   Where can you learn more?  Go to https://www.[employer].net/patiented  Enter I933 in the search box to learn more about \"Acute Sinusitis: Care Instructions.\"  Current as of: September 27, 2023               Content Version: 14.0    9277-7257 [employer].   Care instructions adapted under license by your healthcare professional. If you have questions about a medical condition or this instruction, always ask your healthcare professional. [employer] disclaims any warranty or liability for your use of this information.      Dear Lamberto Poole    After reviewing your responses, I've been able to diagnose you with sinusitis.      Based on your responses and diagnosis, I have prescribed augmentin to treat your symptoms. I have sent this to your pharmacy.? "     It is also important to stay well hydrated, get lots of rest and take over-the-counter decongestants,?tylenol?or ibuprofen if you?are able to?take those medications per your primary care provider to help relieve discomfort.?     It is important that you take?all of?your prescribed medication even if your symptoms are improving after a few doses.? Taking?all of?your medicine helps prevent the symptoms from returning.?     If your symptoms worsen, you develop severe headache, vomiting, high fever (>102), or are not improving in 7 days, please contact your primary care provider for an appointment or visit any of our convenient Walk-in Care or Urgent Care Centers to be seen which can be found on our website?here.?     Thanks again for choosing?us?as your health care partner,?   ?  Nicole Slater, SLOAN?

## 2024-03-20 DIAGNOSIS — F41.9 ANXIETY: ICD-10-CM

## 2024-03-20 RX ORDER — SERTRALINE HYDROCHLORIDE 100 MG/1
200 TABLET, FILM COATED ORAL DAILY
Qty: 180 TABLET | Refills: 0 | Status: SHIPPED | OUTPATIENT
Start: 2024-03-20 | End: 2024-07-01

## 2024-04-15 ENCOUNTER — E-VISIT (OUTPATIENT)
Dept: URGENT CARE | Facility: CLINIC | Age: 29
End: 2024-04-15
Payer: COMMERCIAL

## 2024-04-15 DIAGNOSIS — J06.9 VIRAL URI: ICD-10-CM

## 2024-04-15 DIAGNOSIS — R09.81 NASAL CONGESTION: ICD-10-CM

## 2024-04-15 PROCEDURE — 99421 OL DIG E/M SVC 5-10 MIN: CPT | Performed by: PHYSICIAN ASSISTANT

## 2024-04-15 RX ORDER — IPRATROPIUM BROMIDE 42 UG/1
2 SPRAY, METERED NASAL 4 TIMES DAILY
Qty: 15 ML | Refills: 0 | Status: SHIPPED | OUTPATIENT
Start: 2024-04-15 | End: 2024-06-07

## 2024-04-16 NOTE — PATIENT INSTRUCTIONS
Acute Sinusitis: Care Instructions  Overview     Acute sinusitis is an inflammation of the mucous membranes inside the nose and sinuses. Sinuses are the hollow spaces in your skull around the eyes and nose. Acute sinusitis often follows a cold. Acute sinusitis causes thick, discolored mucus that drains from the nose or down the back of the throat. It also can cause pain and pressure in your head and face along with a stuffy or blocked nose.  In most cases, sinusitis gets better on its own in 1 to 2 weeks. But some mild symptoms may last for several weeks. Sometimes antibiotics are needed if there is a bacterial infection.  Follow-up care is a key part of your treatment and safety. Be sure to make and go to all appointments, and call your doctor if you are having problems. It's also a good idea to know your test results and keep a list of the medicines you take.  How can you care for yourself at home?    Use saline (saltwater) nasal washes. This can help keep your nasal passages open and wash out mucus and allergens.  ? You can buy saline nose washes at a grocery store or drugstore. Follow the instructions on the package.  ? You can make your own at home. Add 1 teaspoon of non-iodized salt and 1 teaspoon of baking soda to 2 cups of distilled or boiled and cooled water. Fill a squeeze bottle or a nasal cleansing pot (such as a neti pot) with the nasal wash. Then put the tip into your nostril, and lean over the sink. With your mouth open, gently squirt the liquid. Repeat on the other side.    Try a decongestant nasal spray like oxymetazoline (Afrin). Do not use it for more than 3 days in a row. Using it for more than 3 days can make your congestion worse.    If needed, take an over-the-counter pain medicine, such as acetaminophen (Tylenol), ibuprofen (Advil, Motrin), or naproxen (Aleve). Read and follow all instructions on the label.    If the doctor prescribed antibiotics, take them as directed. Do not stop taking  "them just because you feel better. You need to take the full course of antibiotics.    Be careful when taking over-the-counter cold or flu medicines and Tylenol at the same time. Many of these medicines have acetaminophen, which is Tylenol. Read the labels to make sure that you are not taking more than the recommended dose. Too much acetaminophen (Tylenol) can be harmful.    Try a steroid nasal spray. It may help with your symptoms.    Breathe warm, moist air. You can use a steamy shower, a hot bath, or a sink filled with hot water. Avoid cold, dry air. Using a humidifier in your home may help. Follow the directions for cleaning the machine.  When should you call for help?   Call your doctor now or seek immediate medical care if:      You have new or worse swelling, redness, or pain in your face or around one or both of your eyes.       You have double vision or a change in your vision.       You have a high fever.       You have a severe headache and a stiff neck.       You have mental changes, such as feeling confused or much less alert.   Watch closely for changes in your health, and be sure to contact your doctor if:      You are not getting better as expected.   Where can you learn more?  Go to https://www.[employer]s.net/patiented  Enter I933 in the search box to learn more about \"Acute Sinusitis: Care Instructions.\"  Current as of: September 27, 2023               Content Version: 14.0    8320-1799 [employer].   Care instructions adapted under license by your healthcare professional. If you have questions about a medical condition or this instruction, always ask your healthcare professional. [employer] disclaims any warranty or liability for your use of this information.       The symptoms you describe suggest a viral cause, which is much more common than a bacterial cause. Antibiotics will treat bacterial infections, but have no effect on viral infections. If possible, especially " if improving, start with symptom care for the first 7-10 days, then consider seeking further treatment or taking an antibiotic. Bacterial infections generally are more severe, including symptoms such as pus, fever over 101degrees F, or rapidly worsening.  Thank you for choosing us for your care. I have placed an order for a prescription so that you can start treatment. View your full visit summary for details by clicking on the link below. Your pharmacist will able to address any questions you may have about the medication.     If you're not feeling better within 5-7 days, please schedule an appointment.  You can schedule an appointment right here in [facility], or call [phone]  If the visit is for the same symptoms as your eVisit, we'll refund the cost of your eVisit if seen within seven days.

## 2024-04-17 ENCOUNTER — E-VISIT (OUTPATIENT)
Dept: URGENT CARE | Facility: CLINIC | Age: 29
End: 2024-04-17
Payer: COMMERCIAL

## 2024-04-17 DIAGNOSIS — J01.90 ACUTE SINUSITIS, RECURRENCE NOT SPECIFIED, UNSPECIFIED LOCATION: Primary | ICD-10-CM

## 2024-04-17 PROCEDURE — 99207 PR NON-BILLABLE SERV PER CHARTING: CPT | Performed by: NURSE PRACTITIONER

## 2024-04-17 NOTE — PATIENT INSTRUCTIONS
The symptoms you describe suggest a viral cause, which is much more common than a bacterial cause. Antibiotics will treat bacterial infections, but have no effect on viral infections. If possible, especially if improving, start with symptom care for the first 7-10 days, then consider seeking further treatment or taking an antibiotic. Bacterial infections generally are more severe, including symptoms such as pus, fever over 101degrees F, or rapidly worsening.

## 2024-05-04 ENCOUNTER — E-VISIT (OUTPATIENT)
Dept: URGENT CARE | Facility: CLINIC | Age: 29
End: 2024-05-04
Payer: COMMERCIAL

## 2024-05-04 DIAGNOSIS — J01.90 ACUTE SINUSITIS, RECURRENCE NOT SPECIFIED, UNSPECIFIED LOCATION: Primary | ICD-10-CM

## 2024-05-04 PROCEDURE — 99421 OL DIG E/M SVC 5-10 MIN: CPT | Performed by: PHYSICIAN ASSISTANT

## 2024-05-05 NOTE — PATIENT INSTRUCTIONS
The symptoms you describe suggest a viral cause, which is much more common than a bacterial cause. Antibiotics will treat bacterial infections, but have no effect on viral infections. If possible, especially if improving, start with symptom care for the first 7-10 days, then consider seeking further treatment or taking an antibiotic. Bacterial infections generally are more severe, including symptoms such as pus, fever over 101degrees F, or rapidly worsening.  After reviewing your responses, I've been able to diagnose you with a viral sinus infection.  I'm sorry to hear you are not feeling well!    Based on your responses and diagnosis, I recommend Mucinex D for congestion, Robitussin DM for cough, and ibuprofen or tylenol for pain/fever/headache/sore throat to treat your symptoms.  Make sure you are using medications like these to help your symptoms. Your immune system is hard at work trying to get you better. The average virus lasts about 7-10 days.  Hope you feel better soon!  It is also important to stay well hydrated.    If your symptoms worsen, you develop severe headache, vomiting, high fever (>102), or are not improving in 7 days, please contact your primary care provider for an appointment or visit any of our convenient Walk-in Care or Urgent Care Centers to be seen which can be found on our website?here.?     Thanks again for choosing?us?as your health care partner,?   ?  Sophia Fernandez PA-C, DAVID?

## 2024-05-06 ENCOUNTER — E-VISIT (OUTPATIENT)
Dept: URGENT CARE | Facility: CLINIC | Age: 29
End: 2024-05-06
Payer: COMMERCIAL

## 2024-05-06 DIAGNOSIS — J02.0 STREP PHARYNGITIS: Primary | ICD-10-CM

## 2024-05-06 PROCEDURE — 99207 PR NON-BILLABLE SERV PER CHARTING: CPT | Performed by: NURSE PRACTITIONER

## 2024-05-06 NOTE — PATIENT INSTRUCTIONS
Dear Lamberto,    After reviewing your responses, I would like you to come in for a swab to make sure we treat you correctly. This swab is to evaluate you for possible Strep Throat, and should be scheduled for today or tomorrow. Please use the Schedule Now button in QBE to schedule your swab. Otherwise, click this link to schedule a lab only appointment.    Lab appointments are not available at most locations on the weekends. If no Lab Only appointment is available, you should be seen in any of our convenient Urgent Care Centers for an in person visit, which can be found on our website here.    You will receive instructions with your results in QBE once they are available.     If your symptoms worsen, you develop difficulty breathing, difficulty with drinking enough to stay hydrated, difficulty swallowing your saliva or have fevers for more than 5 days, please contact your primary care provider for an appointment or visit an Urgent Care Center to be seen.      Thanks again for choosing us as your health care partner.   LENORA Hernandez CNP

## 2024-05-07 ENCOUNTER — E-VISIT (OUTPATIENT)
Dept: URGENT CARE | Facility: CLINIC | Age: 29
End: 2024-05-07
Payer: COMMERCIAL

## 2024-05-07 DIAGNOSIS — R09.81 SINUS CONGESTION: ICD-10-CM

## 2024-05-07 DIAGNOSIS — R09.81 NASAL CONGESTION: Primary | ICD-10-CM

## 2024-05-07 PROCEDURE — 99207 PR NON-BILLABLE SERV PER CHARTING: CPT | Performed by: FAMILY MEDICINE

## 2024-05-07 RX ORDER — GUAIFENESIN 1200 MG/1
1200 TABLET, EXTENDED RELEASE ORAL 2 TIMES DAILY
Qty: 60 TABLET | Refills: 0 | Status: SHIPPED | OUTPATIENT
Start: 2024-05-07 | End: 2024-09-13

## 2024-05-07 RX ORDER — IPRATROPIUM BROMIDE 42 UG/1
2 SPRAY, METERED NASAL 4 TIMES DAILY
Qty: 15 ML | Refills: 0 | Status: SHIPPED | OUTPATIENT
Start: 2024-05-07 | End: 2024-09-13

## 2024-05-07 NOTE — LETTER
May 7, 2024      Lamberto Poole  [address] [place] [address]        To Whom It May Concern:    Lamberto Poole  was seen on 5/7/24.  Please excuse his absence on 5/6 - 5/7/24   due to illness.        Sincerely,        LENORA HOYT CNP

## 2024-05-07 NOTE — PATIENT INSTRUCTIONS
You may want to try a nasal lavage (also known as nasal irrigation). You can find over-the-counter products, such as Neti-Pot, at retail locations or make your own at home. Instructions for homemade nasal lavage and more information on the process are available online at http://www.aafp.org/afp/2009/1115/p1121.html.        The vast majority of sinus infections are caused by viruses and improve after 10 days. This means antibiotics will not help your symptoms and possible give you undesirable side effects like diarrhea and upset stomach.    First, improve your sinus hygiene by:    Flonase/fluticasone or Astelin nasal spray in each nostril once a day for 1-4 weeks.  This may take several days to become effective.     Consider saline nasal rinses and nasal lavage.  You may want to try a nasal lavage (also known as nasal irrigation). You can find over-the-counter products, such as Neti-Pot, at retail locations or make your own at home. Instructions for homemade nasal lavage and more information on the process are available online at http://www.aafp.org/afp/2009/1115/p1121.html.    Tylenol (no more than 3,00mg daily) or Ibuprofen 400 mg to 600 mg 4-6 times a day as needed for pain relief and anti-inflammatory.      If your symptoms are not improving or worsening after 10 days since symptom onset, follow up and be evaluated in the clinic.

## 2024-05-07 NOTE — LETTER
May 8, 2024      Lamberto Poole  [address] [place] [address]        To Whom It May Concern:    Lamberto Poole  was seen on 5/7/24.  Please excuse him  until 5/9/24 due to illness.        Sincerely,        Rosita Belle, CNP

## 2024-06-29 DIAGNOSIS — F41.9 ANXIETY: ICD-10-CM

## 2024-07-01 DIAGNOSIS — F41.9 ANXIETY: ICD-10-CM

## 2024-07-01 RX ORDER — SERTRALINE HYDROCHLORIDE 100 MG/1
200 TABLET, FILM COATED ORAL DAILY
Qty: 180 TABLET | Refills: 0 | Status: SHIPPED | OUTPATIENT
Start: 2024-07-01

## 2024-07-01 NOTE — TELEPHONE ENCOUNTER
SERTRALINE 100MG TABLET         Last Written Prescription Date:  3/20/24  Last Fill Quantity: 180,   # refills: 0  Last Office Visit: 12/5/23  Future Office visit:       Routing refill request to provider for review/approval because:  SSRIs Protocol Dxtice3706/29/2024 03:49 PM   Protocol Details AZRA-7 score of less than 5 in past 6 months.         9/20/2022     9:52 AM 6/7/2023     3:00 PM 12/5/2023     8:57 AM   AZRA-7 SCORE   Total Score   4 (minimal anxiety)   Total Score 4 4 4

## 2024-07-02 RX ORDER — SERTRALINE HYDROCHLORIDE 100 MG/1
200 TABLET, FILM COATED ORAL DAILY
Qty: 180 TABLET | Refills: 0 | OUTPATIENT
Start: 2024-07-02

## 2024-07-07 ENCOUNTER — HEALTH MAINTENANCE LETTER (OUTPATIENT)
Age: 29
End: 2024-07-07

## 2024-09-12 ENCOUNTER — TELEPHONE (OUTPATIENT)
Dept: FAMILY MEDICINE | Facility: OTHER | Age: 29
End: 2024-09-12

## 2024-09-12 NOTE — TELEPHONE ENCOUNTER
10:21 AM    Reason for Call: OVERBOOK    Patient is having the following symptoms: anxiety - having panic attacts daily for 1 weeks.    The patient is requesting an appointment for today with pcp.    Was an appointment offered for this call? No      Preferred method for responding to this message: Telephone Call  What is your phone number ? [phone]    If we cannot reach you directly, may we leave a detailed response at the number you provided? Yes    Can this message wait until your PCP/provider returns, if unavailable today? Not applicable    Timothy Olivier

## 2024-09-13 ENCOUNTER — OFFICE VISIT (OUTPATIENT)
Dept: FAMILY MEDICINE | Facility: OTHER | Age: 29
End: 2024-09-13
Attending: NURSE PRACTITIONER
Payer: COMMERCIAL

## 2024-09-13 VITALS
HEIGHT: 67 IN | TEMPERATURE: 97.9 F | WEIGHT: 201.7 LBS | RESPIRATION RATE: 18 BRPM | DIASTOLIC BLOOD PRESSURE: 76 MMHG | SYSTOLIC BLOOD PRESSURE: 136 MMHG | OXYGEN SATURATION: 96 % | BODY MASS INDEX: 31.66 KG/M2 | HEART RATE: 85 BPM

## 2024-09-13 DIAGNOSIS — F41.0 PANIC ATTACK: ICD-10-CM

## 2024-09-13 DIAGNOSIS — R61 NIGHT SWEATS: Primary | ICD-10-CM

## 2024-09-13 DIAGNOSIS — F41.9 ANXIETY: ICD-10-CM

## 2024-09-13 LAB
ALBUMIN SERPL BCG-MCNC: 5.1 G/DL (ref 3.5–5.2)
ALP SERPL-CCNC: 60 U/L (ref 40–150)
ALT SERPL W P-5'-P-CCNC: 19 U/L (ref 0–70)
ANION GAP SERPL CALCULATED.3IONS-SCNC: 12 MMOL/L (ref 7–15)
AST SERPL W P-5'-P-CCNC: 16 U/L (ref 0–45)
BASOPHILS # BLD AUTO: 0 10E3/UL (ref 0–0.2)
BASOPHILS NFR BLD AUTO: 0 %
BILIRUB SERPL-MCNC: 0.6 MG/DL
BUN SERPL-MCNC: 11.7 MG/DL (ref 6–20)
CALCIUM SERPL-MCNC: 9.8 MG/DL (ref 8.8–10.4)
CHLORIDE SERPL-SCNC: 101 MMOL/L (ref 98–107)
CREAT SERPL-MCNC: 1.16 MG/DL (ref 0.67–1.17)
EGFRCR SERPLBLD CKD-EPI 2021: 88 ML/MIN/1.73M2
EOSINOPHIL # BLD AUTO: 0.1 10E3/UL (ref 0–0.7)
EOSINOPHIL NFR BLD AUTO: 1 %
ERYTHROCYTE [DISTWIDTH] IN BLOOD BY AUTOMATED COUNT: 12.7 % (ref 10–15)
GLUCOSE SERPL-MCNC: 96 MG/DL (ref 70–99)
HCO3 SERPL-SCNC: 23 MMOL/L (ref 22–29)
HCT VFR BLD AUTO: 46.9 % (ref 40–53)
HGB BLD-MCNC: 16.4 G/DL (ref 13.3–17.7)
IMM GRANULOCYTES # BLD: 0 10E3/UL
IMM GRANULOCYTES NFR BLD: 0 %
LYMPHOCYTES # BLD AUTO: 1 10E3/UL (ref 0.8–5.3)
LYMPHOCYTES NFR BLD AUTO: 15 %
MCH RBC QN AUTO: 29.4 PG (ref 26.5–33)
MCHC RBC AUTO-ENTMCNC: 35 G/DL (ref 31.5–36.5)
MCV RBC AUTO: 84 FL (ref 78–100)
MONOCYTES # BLD AUTO: 0.5 10E3/UL (ref 0–1.3)
MONOCYTES NFR BLD AUTO: 8 %
NEUTROPHILS # BLD AUTO: 5.2 10E3/UL (ref 1.6–8.3)
NEUTROPHILS NFR BLD AUTO: 76 %
PLATELET # BLD AUTO: 280 10E3/UL (ref 150–450)
POTASSIUM SERPL-SCNC: 4.8 MMOL/L (ref 3.4–5.3)
PROT SERPL-MCNC: 7.5 G/DL (ref 6.4–8.3)
RBC # BLD AUTO: 5.57 10E6/UL (ref 4.4–5.9)
SODIUM SERPL-SCNC: 136 MMOL/L (ref 135–145)
TSH SERPL DL<=0.005 MIU/L-ACNC: 2.52 UIU/ML (ref 0.3–4.2)
WBC # BLD AUTO: 6.9 10E3/UL (ref 4–11)

## 2024-09-13 PROCEDURE — 99214 OFFICE O/P EST MOD 30 MIN: CPT | Performed by: NURSE PRACTITIONER

## 2024-09-13 PROCEDURE — 36415 COLL VENOUS BLD VENIPUNCTURE: CPT | Performed by: NURSE PRACTITIONER

## 2024-09-13 PROCEDURE — G2211 COMPLEX E/M VISIT ADD ON: HCPCS | Performed by: NURSE PRACTITIONER

## 2024-09-13 PROCEDURE — 80050 GENERAL HEALTH PANEL: CPT | Performed by: NURSE PRACTITIONER

## 2024-09-13 ASSESSMENT — PAIN SCALES - GENERAL: PAINLEVEL: NO PAIN (0)

## 2024-09-13 ASSESSMENT — ANXIETY QUESTIONNAIRES
GAD7 TOTAL SCORE: 14
8. IF YOU CHECKED OFF ANY PROBLEMS, HOW DIFFICULT HAVE THESE MADE IT FOR YOU TO DO YOUR WORK, TAKE CARE OF THINGS AT HOME, OR GET ALONG WITH OTHER PEOPLE?: EXTREMELY DIFFICULT
7. FEELING AFRAID AS IF SOMETHING AWFUL MIGHT HAPPEN: SEVERAL DAYS
GAD7 TOTAL SCORE: 14
GAD7 TOTAL SCORE: 14

## 2024-09-13 ASSESSMENT — PATIENT HEALTH QUESTIONNAIRE - PHQ9
SUM OF ALL RESPONSES TO PHQ QUESTIONS 1-9: 12
SUM OF ALL RESPONSES TO PHQ QUESTIONS 1-9: 12
10. IF YOU CHECKED OFF ANY PROBLEMS, HOW DIFFICULT HAVE THESE PROBLEMS MADE IT FOR YOU TO DO YOUR WORK, TAKE CARE OF THINGS AT HOME, OR GET ALONG WITH OTHER PEOPLE: EXTREMELY DIFFICULT

## 2024-09-13 NOTE — PATIENT INSTRUCTIONS
Assessment & Plan         Night sweats  - Comprehensive metabolic panel; Future  - TSH with free T4 reflex; Future  - CBC with platelets and differential; Future        Anxiety  - Continue plan of care        Panic attack  - Continue plan of care        Please continue to take all medication as directed  Please notify your pharmacy or our refill line of future refills needed.  Please return sooner than your next scheduled appointment with any concerns.        When your lab results return, we will call you with abnormal findings  You can also view this information in your MyChart, if you have an account.  Please call or [employer] message us with any questions you may have.          Althea HERRERA  [phone]

## 2024-09-13 NOTE — PROGRESS NOTES
Assessment & Plan       Night sweats  - Comprehensive metabolic panel; Future  - TSH with free T4 reflex; Future  - CBC with platelets and differential; Future      Anxiety  - Continue plan of care      Panic attack  - Continue plan of care        Please continue to take all medication as directed  Please notify your pharmacy or our refill line of future refills needed.  Please return sooner than your next scheduled appointment with any concerns.      When your lab results return, we will call you with abnormal findings  You can also view this information in your MyChart, if you have an account.  Please call or [employer] message us with any questions you may have.        Althea Barragan [facility]  [phone]           Lamberto is a 29 year old, presenting for the following health issues:  Anxiety        Anxiety   How are you doing with your anxiety since your last visit? Worsened -pt states its a little worse  Are you having other symptoms that might be associated with anxiety? No  Have you had a significant life event? No   Are you feeling depressed? No  Do you have any concerns with your use of alcohol or other drugs? No        Panic attacks  - Increasing recently, he feels due to the heat        Night sweats  - Occurring fairly often  - No fever, no illness        Social History     Tobacco Use    Smoking status: Never    Smokeless tobacco: Current    Tobacco comments:     pt denied QP 1/20/2020   Vaping Use    Vaping status: Never Used   Substance Use Topics    Alcohol use: No    Drug use: No             6/7/2023     3:00 PM 12/5/2023     8:57 AM 9/13/2024     1:21 PM   AZRA-7 SCORE   Total Score  4 (minimal anxiety) 14 (moderate anxiety)   Total Score 4 4 14             9/20/2022     9:52 AM 12/5/2023     8:57 AM 9/13/2024     1:21 PM   PHQ   PHQ-9 Total Score 3 3 12   Q9: Thoughts of better off dead/self-harm past 2 weeks Not at all Not at all Not at all             9/13/2024     1:21 PM   Last PHQ-9   1.  Little  interest or pleasure in doing things 2   2.  Feeling down, depressed, or hopeless 1   3.  Trouble falling or staying asleep, or sleeping too much 3   4.  Feeling tired or having little energy 2   5.  Poor appetite or overeating 2   6.  Feeling bad about yourself 1   7.  Trouble concentrating 1   8.  Moving slowly or restless 0   Q9: Thoughts of better off dead/self-harm past 2 weeks 0   PHQ-9 Total Score 12             9/13/2024     1:21 PM   AZRA-7    1. Feeling nervous, anxious, or on edge 2   2. Not being able to stop or control worrying 2   3. Worrying too much about different things 3   4. Trouble relaxing 2   5. Being so restless that it is hard to sit still 2   6. Becoming easily annoyed or irritable 2   7. Feeling afraid, as if something awful might happen 1   AZRA-7 Total Score 14   If you checked any problems, how difficult have they made it for you to do your work, take care of things at home, or get along with other people? Extremely difficult         Patient Active Problem List   Diagnosis    Chronic rhinitis    Anxiety    History of sinus tachycardia    ACP (advance care planning)    Primary insomnia    Panic attack    Attention deficit hyperactivity disorder (ADHD), predominantly inattentive type     No past surgical history on file.    Social History     Tobacco Use    Smoking status: Never    Smokeless tobacco: Current    Tobacco comments:     pt denied QP 1/20/2020   Substance Use Topics    Alcohol use: No     Family History   Problem Relation Age of Onset    Hypertension Father     Heart Murmur Father              Current Outpatient Medications   Medication Sig Dispense Refill    hydrOXYzine HCl (ATARAX) 25 MG tablet 1 po BID PRN panic attack 30 tablet 1    ibuprofen (ADVIL/MOTRIN) 800 MG tablet Take 1 tablet (800 mg) by mouth every 8 hours as needed for moderate pain 90 tablet 1    sertraline (ZOLOFT) 100 MG tablet TAKE 2 TABLETS (200 MG) BY MOUTH DAILY 180 tablet 0         No Known  "Allergies        Recent Labs   Lab Test 08/30/22  1434 08/30/19  0941 05/03/17  1031   A1C 5.2  --   --    ALT 35  --   --    CR 1.08  --  0.91   GFRESTIMATED >90  --  >90  Non  GFR Calc     GFRESTBLACK  --   --  >90  African American GFR Calc     POTASSIUM 4.1  --  4.0   TSH 2.31 2.02 1.68          BP Readings from Last 3 Encounters:   09/13/24 136/76   02/28/24 126/86   12/05/23 138/82    Wt Readings from Last 3 Encounters:   09/13/24 91.5 kg (201 lb 11.2 oz)   02/28/24 87.1 kg (192 lb)   12/05/23 90.5 kg (199 lb 8 oz)                   Review of Systems  Constitutional, neuro, ENT, endocrine, pulmonary, cardiac, gastrointestinal, genitourinary, musculoskeletal, integument and psychiatric systems are negative, except as otherwise noted.          Objective    /76 (BP Location: Left arm, Patient Position: Sitting, Cuff Size: Adult Regular)   Pulse 85   Temp 97.9  F (36.6  C) (Tympanic)   Resp 18   Ht 1.702 m (5' 7\")   Wt 91.5 kg (201 lb 11.2 oz)   SpO2 96%   BMI 31.59 kg/m    Body mass index is 31.59 kg/m .          Physical Exam   GENERAL: alert and no distress  EYES: Eyes grossly normal to inspection, PERRL and conjunctivae and sclerae normal  HENT: ear canals and TM's normal, nose and mouth without ulcers or lesions  NECK: no adenopathy, no asymmetry, masses, or scars  RESP: lungs clear to auscultation - no rales, rhonchi or wheezes  CV: regular rate and rhythm, normal S1 S2, no S3 or S4, no murmur, click or rub, no peripheral edema  PSYCH: mentation appears normal, affect normal/bright          The longitudinal plan of care for the diagnosis(es)/condition(s) as documented were addressed during this visit. Due to the added complexity in care, I will continue to support Lamberto in the subsequent management and with ongoing continuity of care.           Signed Electronically by: Althea Barragan CNP        "

## 2024-09-13 NOTE — LETTER
September 13, 2024      Lamberto Poole  [address]  [place] [address]        To Whom It May Concern,       Lamberto missed work half a day 9/11/24, all day 9/12/24, as well as today due to medical concern.  May RTW 9/16/24 without restriction.      Sincerely,        Althea Barragan, CNP

## 2024-10-07 ENCOUNTER — MYC REFILL (OUTPATIENT)
Dept: FAMILY MEDICINE | Facility: OTHER | Age: 29
End: 2024-10-07

## 2024-10-07 DIAGNOSIS — F41.9 ANXIETY: ICD-10-CM

## 2024-10-09 RX ORDER — SERTRALINE HYDROCHLORIDE 100 MG/1
200 TABLET, FILM COATED ORAL DAILY
Qty: 180 TABLET | Refills: 0 | Status: SHIPPED | OUTPATIENT
Start: 2024-10-09

## 2024-10-09 NOTE — TELEPHONE ENCOUNTER
sertraline (ZOLOFT) 100 MG tablet       Last Written Prescription Date:  7/1/24  Last Fill Quantity: 180,   # refills: 0  Last Office Visit: 9/13/24  Future Office visit:       Routing refill request to provider for review/approval because:  SSRIs Protocol Zorqfo82/07/2024 10:21 AM   Protocol Details AZRA-7 score of less than 5 in past 6 months.         6/7/2023     3:00 PM 12/5/2023     8:57 AM 9/13/2024     1:21 PM   AZRA-7 SCORE   Total Score  4 (minimal anxiety) 14 (moderate anxiety)   Total Score 4 4 14

## 2024-11-07 ENCOUNTER — E-VISIT (OUTPATIENT)
Dept: URGENT CARE | Facility: CLINIC | Age: 29
End: 2024-11-07
Payer: COMMERCIAL

## 2024-11-07 DIAGNOSIS — J01.90 ACUTE SINUSITIS WITH SYMPTOMS > 10 DAYS: Primary | ICD-10-CM

## 2024-11-07 PROCEDURE — 99421 OL DIG E/M SVC 5-10 MIN: CPT | Performed by: PHYSICIAN ASSISTANT

## 2024-11-07 NOTE — PATIENT INSTRUCTIONS
Dear Lamberto Poole,    After reviewing your responses, I've been able to diagnose you with?a sinus infection. I sent in a prescription for augmentin to help fight this infection. It is important to stay well hydrated and get lots of rest. Unless you have been told not to take these medications by your provider, I recommend taking these to help ease your symptoms:    - Flonase (fluticasone) nasal spray, 2 sprays in each nostril daily, to reduce swelling in your nasal passages  - A decongestant like Sudafed (pseudoephedrine) which is available behind the pharmacist counter and helps to relieve congestion  - Tylenol or an NSAID such as ibuprofen or naproxen as needed for pain/fever  - A nasal rinse such as the Netti pot with bottled or distilled water and saline packets helps to flush sinuses  - Mucinex (guiafenesin) which thins mucus and may help it to loosen more quickly    You can also sit in the bathroom with the door closed and hot shower running to loosen mucus.    If your symptoms significantly worsen, you develop a severe headache, vomiting, high fever (>102F) or have not improved by day 10, please contact your primary care provider for an appointment or visit any of our convenient Walk-in Care or Urgent Care Centers to be seen which can be found on our website?here.?     Thanks again for choosing?us?as your health care partner,?   ?  Roxanne Martins PA-C?

## 2024-11-25 ENCOUNTER — E-VISIT (OUTPATIENT)
Dept: URGENT CARE | Facility: CLINIC | Age: 29
End: 2024-11-25
Payer: COMMERCIAL

## 2024-11-25 DIAGNOSIS — R09.81 NASAL CONGESTION: Primary | ICD-10-CM

## 2024-11-25 PROCEDURE — 99207 PR NON-BILLABLE SERV PER CHARTING: CPT | Performed by: NURSE PRACTITIONER

## 2024-11-26 NOTE — PATIENT INSTRUCTIONS
Dear Lamberto Poole,    We are sorry you are not feeling well. Based on the responses you provided, it is recommended that you be seen in-person in urgent care so we can better evaluate your symptoms. Please click here to find the nearest urgent care location to you.   You will not be charged for this Visit. Thank you for trusting us with your care.    Nicole Slater, CNP    You can try the Augmentin again. Could be you need a longer course than 7 days.

## 2024-11-27 NOTE — PROGRESS NOTES
"  Assessment & Plan     Sore throat  Negative for mono.  Has had amble coverage for strep and I see no evidence of that this visit. Sores to the posterior pharynx likely viral. Will rule out herpes, although Lamberto is low risk based on discussion. Will treat with antiviral to see this helps symptoms.   - Mononucleosis screen; Future  - CBC with platelets and differential; Future  - CRP, inflammation; Future  - Mononucleosis screen  - CBC with platelets and differential  - CRP, inflammation  - Herpes Simplex Virus 1&2 by PCR; Future  - Herpes Simplex Virus 1&2 by PCR    Lesion of throat  - valACYclovir (VALTREX) 1000 mg tablet; Take 1 tablet (1,000 mg) by mouth 2 times daily for 10 days.  - Herpes Simplex Virus 1&2 by PCR; Future  - Herpes Simplex Virus 1&2 by PCR    Herpetiform aphthous ulceration  - valACYclovir (VALTREX) 1000 mg tablet; Take 1 tablet (1,000 mg) by mouth 2 times daily for 10 days.  - Herpes Simplex Virus 1&2 by PCR; Future  - Herpes Simplex Virus 1&2 by PCR          BMI  Estimated body mass index is 32.08 kg/m  as calculated from the following:    Height as of this encounter: 1.702 m (5' 7\").    Weight as of this encounter: 92.9 kg (204 lb 12.8 oz).             No follow-ups on file.    Jeffery Orantes is a 29 year old, presenting for the following health issues:  Pharyngitis        11/29/2024     2:33 PM   Additional Questions   Roomed by Kale Neal LPN   Accompanied by Self     HPI     Acute Illness  Acute illness concerns: sore throat   Onset/Duration: 11/25/2024 11/7/24 for possible strep and treated for sinusitis with augmentin x 7 days. Was given another course of Augmentin through doctor on demand on 11/25 and is currently on this medication.       Per chart review. Had e-visit   Symptoms:  Fever: No  Chills/Sweats: No  Headache (location?): YES- slight  Sinus Pressure: YES  Conjunctivitis:  No  Ear Pain: YES: bilateral  Rhinorrhea: YES  Congestion: YES  Sore Throat: YES  Cough: " "no  Wheeze: No  Decreased Appetite: No  Nausea: No  Vomiting: No  Diarrhea: No  Dysuria/Freq.: No  Dysuria or Hematuria: No  Fatigue/Achiness: YES- Fatigue  Sick/Strep Exposure: YES- School, ,   Therapies tried and outcome: None    Exposure to mono: none known.   Sexually active: yes.   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           Objective    /74   Pulse 95   Temp 98.1  F (36.7  C) (Tympanic)   Resp 16   Ht 1.702 m (5' 7\")   Wt 92.9 kg (204 lb 12.8 oz)   SpO2 98%   BMI 32.08 kg/m    Body mass index is 32.08 kg/m .  Physical Exam   GENERAL: alert and no distress  EYES: Eyes grossly normal to inspection, PERRL and conjunctivae and sclerae normal  HENT: normal cephalic/atraumatic, ear canals and TM's normal, oral mucous membranes moist. Cluster of oval ulcerations with red base to posterior pharynx. No exudates.   NECK: thyroid normal to palpation. [place] anterior cervical lymph nodes.  RESP: lungs clear to auscultation - no rales, rhonchi or wheezes  CV: regular rate and rhythm, normal S1 S2, no S3 or S4, no murmur, click or rub, no peripheral edema   SKIN: no suspicious lesions or rashes  PSYCH: mentation appears normal, affect normal/bright    Labs and/or imaging are pending at the end of this clinic visit. Please see communication attached to labs and/or imaging results.           Signed Electronically by: Roxanne Us CNP    "

## 2024-11-29 ENCOUNTER — OFFICE VISIT (OUTPATIENT)
Dept: FAMILY MEDICINE | Facility: OTHER | Age: 29
End: 2024-11-29
Attending: NURSE PRACTITIONER
Payer: COMMERCIAL

## 2024-11-29 VITALS
TEMPERATURE: 98.1 F | HEIGHT: 67 IN | HEART RATE: 95 BPM | RESPIRATION RATE: 16 BRPM | WEIGHT: 204.8 LBS | DIASTOLIC BLOOD PRESSURE: 74 MMHG | SYSTOLIC BLOOD PRESSURE: 118 MMHG | BODY MASS INDEX: 32.15 KG/M2 | OXYGEN SATURATION: 98 %

## 2024-11-29 DIAGNOSIS — K12.0: ICD-10-CM

## 2024-11-29 DIAGNOSIS — J39.2 LESION OF THROAT: ICD-10-CM

## 2024-11-29 DIAGNOSIS — J02.9 SORE THROAT: Primary | ICD-10-CM

## 2024-11-29 LAB
BASOPHILS # BLD AUTO: 0 10E3/UL (ref 0–0.2)
BASOPHILS NFR BLD AUTO: 0 %
CRP SERPL-MCNC: <3 MG/L
EOSINOPHIL # BLD AUTO: 0.1 10E3/UL (ref 0–0.7)
EOSINOPHIL NFR BLD AUTO: 1 %
ERYTHROCYTE [DISTWIDTH] IN BLOOD BY AUTOMATED COUNT: 13 % (ref 10–15)
HCT VFR BLD AUTO: 43.9 % (ref 40–53)
HGB BLD-MCNC: 15.6 G/DL (ref 13.3–17.7)
IMM GRANULOCYTES # BLD: 0.1 10E3/UL
IMM GRANULOCYTES NFR BLD: 1 %
LYMPHOCYTES # BLD AUTO: 1 10E3/UL (ref 0.8–5.3)
LYMPHOCYTES NFR BLD AUTO: 14 %
MCH RBC QN AUTO: 29.9 PG (ref 26.5–33)
MCHC RBC AUTO-ENTMCNC: 35.5 G/DL (ref 31.5–36.5)
MCV RBC AUTO: 84 FL (ref 78–100)
MONOCYTES # BLD AUTO: 0.5 10E3/UL (ref 0–1.3)
MONOCYTES NFR BLD AUTO: 8 %
MONOCYTES NFR BLD AUTO: NEGATIVE %
NEUTROPHILS # BLD AUTO: 5.1 10E3/UL (ref 1.6–8.3)
NEUTROPHILS NFR BLD AUTO: 76 %
PLATELET # BLD AUTO: 282 10E3/UL (ref 150–450)
RBC # BLD AUTO: 5.22 10E6/UL (ref 4.4–5.9)
WBC # BLD AUTO: 6.7 10E3/UL (ref 4–11)

## 2024-11-29 PROCEDURE — 85025 COMPLETE CBC W/AUTO DIFF WBC: CPT | Performed by: NURSE PRACTITIONER

## 2024-11-29 PROCEDURE — 36415 COLL VENOUS BLD VENIPUNCTURE: CPT | Performed by: NURSE PRACTITIONER

## 2024-11-29 PROCEDURE — 86140 C-REACTIVE PROTEIN: CPT | Performed by: NURSE PRACTITIONER

## 2024-11-29 PROCEDURE — 99214 OFFICE O/P EST MOD 30 MIN: CPT | Performed by: NURSE PRACTITIONER

## 2024-11-29 PROCEDURE — 87529 HSV DNA AMP PROBE: CPT | Performed by: NURSE PRACTITIONER

## 2024-11-29 PROCEDURE — 86308 HETEROPHILE ANTIBODY SCREEN: CPT | Performed by: NURSE PRACTITIONER

## 2024-11-29 RX ORDER — VALACYCLOVIR HYDROCHLORIDE 1 G/1
1000 TABLET, FILM COATED ORAL 2 TIMES DAILY
Qty: 20 TABLET | Refills: 0 | Status: SHIPPED | OUTPATIENT
Start: 2024-11-29 | End: 2024-12-09

## 2024-11-29 ASSESSMENT — PAIN SCALES - GENERAL: PAINLEVEL_OUTOF10: MILD PAIN (3)

## 2024-11-29 NOTE — LETTER
2024    Lamberto T Virgilio   1995        To Whom it May Concern;    Please excuse Lamberto Kimhman from work/school for any missed work the week of 24-24 .    Sincerely,        Roxanne Us, CNP

## 2024-11-30 LAB
HSV1 DNA SPEC QL NAA+PROBE: NOT DETECTED
HSV2 DNA SPEC QL NAA+PROBE: NOT DETECTED
SPECIMEN TYPE: NORMAL

## 2025-01-06 NOTE — TELEPHONE ENCOUNTER
Order signed      Zoloft      Last Written Prescription Date: 7/19/17  Last Fill Quantity: 30,  # refills: 3   Last Office Visit with [facility], [facility] or [facility] prescribing provider: 7/19/17

## 2025-01-14 PROBLEM — G43.009 MIGRAINE WITHOUT AURA AND WITHOUT STATUS MIGRAINOSUS, NOT INTRACTABLE: Status: ACTIVE | Noted: 2025-01-14

## 2025-01-17 DIAGNOSIS — F41.9 ANXIETY: ICD-10-CM

## 2025-01-17 NOTE — TELEPHONE ENCOUNTER
SERTRALINE 100MG TABLET         Last Written Prescription Date:  10/9/24  Last Fill Quantity: 180,   # refills: 0  Last Office Visit: 11/29/24  Future Office visit:       Routing refill request to provider for review/approval because:    SSRIs Protocol [id number]/17/2025 12:52 PM   Protocol Details AZRA-7 score of less than 5 in past 6 months.           6/7/2023     3:00 PM 12/5/2023     8:57 AM 9/13/2024     1:21 PM   AZRA-7 SCORE   Total Score  4 (minimal anxiety) 14 (moderate anxiety)   Total Score 4 4 14

## 2025-01-18 RX ORDER — SERTRALINE HYDROCHLORIDE 100 MG/1
200 TABLET, FILM COATED ORAL DAILY
Qty: 180 TABLET | Refills: 0 | Status: SHIPPED | OUTPATIENT
Start: 2025-01-18

## 2025-02-20 ENCOUNTER — E-VISIT (OUTPATIENT)
Dept: URGENT CARE | Facility: CLINIC | Age: 30
End: 2025-02-20
Payer: COMMERCIAL

## 2025-02-20 DIAGNOSIS — J30.9 ALLERGIC RHINITIS WITH POSTNASAL DRIP: ICD-10-CM

## 2025-02-20 DIAGNOSIS — R09.82 ALLERGIC RHINITIS WITH POSTNASAL DRIP: ICD-10-CM

## 2025-02-20 DIAGNOSIS — J01.90 ACUTE SINUSITIS, RECURRENCE NOT SPECIFIED, UNSPECIFIED LOCATION: Primary | ICD-10-CM

## 2025-02-20 RX ORDER — FLUTICASONE PROPIONATE 50 MCG
1 SPRAY, SUSPENSION (ML) NASAL DAILY
Qty: 16 G | Refills: 0 | Status: SHIPPED | OUTPATIENT
Start: 2025-02-20

## 2025-02-20 NOTE — PATIENT INSTRUCTIONS
The symptoms you describe suggest a viral cause, which is much more common than a bacterial cause. Antibiotics will treat bacterial infections, but have no effect on viral infections. If possible, especially if improving, start with symptom care for the first 7-10 days, then consider seeking further treatment or taking an antibiotic. Bacterial infections generally are more severe, including symptoms such as pus, fever over 101degrees F, or rapidly worsening.  Dear Lamberto Poole    After reviewing your responses, I've been able to diagnose you with    Acute sinusitis, recurrence not specified, unspecified location  Allergic rhinitis with postnasal drip.      Based on your responses and diagnosis, I have prescribed   Orders Placed This Encounter   Medications     fluticasone (FLONASE) 50 MCG/ACT nasal spray     Sig: Spray 1 spray into both nostrils daily.     Dispense:  16 g     Refill:  0      to treat your symptoms. I have sent this to your pharmacy.?     It is also important to stay well hydrated, get lots of rest and take over-the-counter decongestants,?tylenol?or ibuprofen if you?are able to?take those medications per your primary care provider to help relieve discomfort.?     It is important that you take?all of?your prescribed medication even if your symptoms are improving after a few doses.? Taking?all of?your medicine helps prevent the symptoms from returning.?     If your symptoms worsen, you develop severe headache, vomiting, high fever (>102), or are not improving in 7 days, please contact your primary care provider for an appointment or visit any of our convenient Walk-in Care or Urgent Care Centers to be seen which can be found on our website?here.?     Thanks again for choosing?us?as your health care partner,?   ?  Sophia Fernandez PA-C?   After reviewing your responses, I've been able to diagnose you with a viral sinus infection.  I'm sorry to hear you are not feeling well!    Based on your  responses and diagnosis, I recommend Mucinex D for congestion, Robitussin DM for cough, and ibuprofen or tylenol for pain/fever/headache/sore throat to treat your symptoms.  Make sure you are using medications like these to help your symptoms. Your immune system is hard at work trying to get you better. The average virus lasts about 7-10 days.  Hope you feel better soon!  It is also important to stay well hydrated.    Sinus infections can be avoided or lessened with daily use of flonase for controlling your allergies which lead into sinus infections.  Once you become congested, you can start Mucinex D to get the mucous draining, so it does not become bacterial.    If your symptoms worsen, you develop severe headache, vomiting, high fever (>102), or are not improving in 7 days, please contact your primary care provider for an appointment or visit any of our convenient Walk-in Care or Urgent Care Centers to be seen which can be found on our website?here.?     Thanks again for choosing?us?as your health care partner,?   ?  Sophia Fernandez PA-C, DAVID?     Allergies: Care Instructions  Overview    Allergies occur when your body's defense system (immune system) overreacts to certain substances. The immune system treats a harmless substance as if it were a harmful germ or virus. Many things can make this happen. These include pollens, medicine, food, dust, animal dander, and mold.  Allergies can be mild or severe. Mild allergies can be managed with home treatment. But medicine may be needed to prevent problems.  Managing your allergies is an important part of staying healthy. Your doctor may suggest that you have allergy testing to help find out what is causing your allergies.  Severe allergies can cause reactions that affect your whole body (anaphylactic reactions). Your doctor may prescribe an epinephrine medicine, such as an epinephrine shot or nasal spray, to carry with you in case you have a severe reaction. Learn  "how to give yourself the medicine and keep it with you at all times. Make sure it is not .  Follow-up care is a key part of your treatment and safety. Be sure to make and go to all appointments, and call your doctor if you are having problems. It's also a good idea to know your test results and keep a list of the medicines you take.  How can you care for yourself at home?  If you have been told by your doctor that dust or dust mites are causing your allergy, decrease the dust around your bed:  Wash sheets, pillowcases, and other bedding in hot water every week.  Use dust-proof covers for pillows, duvets, and mattresses. Avoid plastic covers because they tear easily and do not \"breathe.\" Wash as instructed on the label.  Do not use any blankets and pillows that you do not need.  Use blankets that you can wash in your washing machine.  Consider removing drapes and carpets, which attract and hold dust, from your bedroom.  If you are allergic to house dust and mites, do not use home humidifiers. Your doctor can suggest ways you can control dust and mites.  Look for signs of cockroaches. Cockroaches cause allergic reactions. Use cockroach baits to get rid of them. Then, clean your home well. Cockroaches like areas where grocery bags, newspapers, empty bottles, or cardboard boxes are stored. Do not keep these inside your home, and keep trash and food containers sealed. Seal off any spots where cockroaches might enter your home.  If you are allergic to mold, get rid of furniture, rugs, and drapes that smell musty. Check for mold in the bathroom.  If you are allergic to outdoor pollen or mold spores, use air-conditioning. Change or clean all filters every month. Keep windows closed.  If you are allergic to pollen, stay inside when pollen counts are high. Use a vacuum  with a HEPA filter or a double-thickness filter at least two times each week.  Stay inside when air pollution is bad. Avoid paint fumes, " "perfumes, and other strong odors.  Avoid conditions that make your allergies worse. Stay away from smoke. Do not smoke or let anyone else smoke in your house. Do not use fireplaces or wood-burning stoves.  If you are allergic to your pets, change the air filter in your furnace every month. Use high-efficiency filters.  If you are allergic to pet dander, keep pets outside or out of your bedroom. Old carpet and cloth furniture can hold a lot of animal dander. You may need to replace them.  When should you call for help?  Use an epinephrine medicine, such as an epinephrine shot or nasal spray, if:  You think you are having a severe allergic reaction.  You have symptoms in more than one body area, such as mild nausea and an itchy mouth.  After giving an epinephrine medicine, call 911, even if you feel better.  Call 911  anytime you think you may need emergency care. For example, call if:  You have symptoms of a severe allergic reaction. These may include:  Sudden raised, red areas (hives) all over your body.  Swelling of the throat, mouth, lips, or tongue.  Trouble breathing.  Passing out (losing consciousness). Or you may feel very lightheaded or suddenly feel weak, confused, or restless.  Severe belly pain, nausea, vomiting, or diarrhea.  Call your doctor now or seek immediate medical care if:  You have symptoms of an allergic reaction, such as:  A rash or hives (raised, red areas on the skin).  Itching.  Swelling.  Mild belly pain or nausea.  Watch closely for changes in your health, and be sure to contact your doctor if:  You do not get better as expected.  Where can you learn more?  Go to https://www.[employer].net/patiented  Enter W171 in the search box to learn more about \"Allergies: Care Instructions.\"  Current as of: September 25, 2023  Content Version: 14.3    2024 [employer].   Care instructions adapted under license by your healthcare professional. If you have questions about a medical condition " or this instruction, always ask your healthcare professional. [employer] [facility] disclaims any warranty or liability for your use of this information.

## 2025-04-01 ENCOUNTER — E-VISIT (OUTPATIENT)
Dept: URGENT CARE | Facility: CLINIC | Age: 30
End: 2025-04-01
Payer: COMMERCIAL

## 2025-04-01 DIAGNOSIS — H92.02 LEFT EAR PAIN: Primary | ICD-10-CM

## 2025-04-01 PROCEDURE — 99207 PR NON-BILLABLE SERV PER CHARTING: CPT | Performed by: NURSE PRACTITIONER

## 2025-04-28 DIAGNOSIS — F41.9 ANXIETY: ICD-10-CM

## 2025-04-28 NOTE — TELEPHONE ENCOUNTER
Zoloft      Last Written Prescription Date:  1/18/25  Last Fill Quantity: 180,   # refills: 0  Last Office Visit: 11/29/24  Future Office visit:       Routing refill request to provider for review/approval because:

## 2025-04-29 RX ORDER — SERTRALINE HYDROCHLORIDE 100 MG/1
200 TABLET, FILM COATED ORAL DAILY
Qty: 180 TABLET | Refills: 0 | Status: SHIPPED | OUTPATIENT
Start: 2025-04-29

## 2025-04-29 NOTE — TELEPHONE ENCOUNTER
Routing refill request to provider for review/approval because:    SSRIs Protocol [id number]/28/2025 01:02 PM   Protocol Details   AZRA-7 score of less than 5 in past 6 months.            6/7/2023     3:00 PM 12/5/2023     8:57 AM 9/13/2024     1:21 PM   AZRA-7 SCORE   Total Score  4 (minimal anxiety) 14 (moderate anxiety)   Total Score 4 4 14

## 2025-05-25 ENCOUNTER — E-VISIT (OUTPATIENT)
Dept: URGENT CARE | Facility: CLINIC | Age: 30
End: 2025-05-25
Payer: COMMERCIAL

## 2025-05-25 DIAGNOSIS — B96.89 ACUTE BACTERIAL SINUSITIS: Primary | ICD-10-CM

## 2025-05-25 DIAGNOSIS — J01.90 ACUTE BACTERIAL SINUSITIS: Primary | ICD-10-CM

## 2025-05-25 NOTE — PATIENT INSTRUCTIONS
Thank you for choosing us for your care. I have placed an order for a prescription so that you can start treatment:  Orders Placed This Encounter   Medications     amoxicillin-clavulanate (AUGMENTIN) 875-125 MG tablet     Sig: Take 1 tablet by mouth 2 times daily for 7 days.     Dispense:  14 tablet     Refill:  0        View your full visit summary for details by clicking on the link below. Your pharmacist will able to address any questions you may have about the medication.     If you're not feeling better within 5-7 days, please schedule an appointment.  You can schedule an appointment right here in [facility], or call [phone]  If the visit is for the same symptoms as your eVisit, we'll refund the cost of your eVisit if seen within seven days.

## 2025-06-09 ENCOUNTER — OFFICE VISIT (OUTPATIENT)
Dept: FAMILY MEDICINE | Facility: OTHER | Age: 30
End: 2025-06-09
Attending: NURSE PRACTITIONER
Payer: COMMERCIAL

## 2025-06-09 VITALS
TEMPERATURE: 98.6 F | WEIGHT: 213 LBS | RESPIRATION RATE: 18 BRPM | DIASTOLIC BLOOD PRESSURE: 74 MMHG | OXYGEN SATURATION: 97 % | SYSTOLIC BLOOD PRESSURE: 130 MMHG | HEART RATE: 103 BPM | BODY MASS INDEX: 33.36 KG/M2

## 2025-06-09 DIAGNOSIS — J06.9 VIRAL UPPER RESPIRATORY TRACT INFECTION: Primary | ICD-10-CM

## 2025-06-09 ASSESSMENT — PAIN SCALES - GENERAL: PAINLEVEL_OUTOF10: MODERATE PAIN (5)

## 2025-06-09 NOTE — LETTER
2025    Lamberto Poole   1995        To Whom it May Concern;    Please excuse Lamberto Poole from work/school for a healthcare visit on 2025. Please excuse the whole day of 2025.     Sincerely,        Roxanne Us, CNP

## 2025-06-09 NOTE — PROGRESS NOTES
"  Assessment & Plan     Viral upper respiratory tract infection  -Supportive care education provided:  To support your body's ability to stay well, The following are encouraged:   Fluids- Water or low-sugar sports type drink are good choices  Rest as much as you are able. Your body needs   If you need fever control- you may use acetaminophen and/or ibuprofen per instructions on the package unless you have been told by a provider not to take one of these medications.    Use a cool mist humidifier. Please follow manufacture's cleaning instructions.  You may try guaifenesin, loratadine, and/or fluticasone nasal spray per package instructions. Please read all active ingredients on all packages. Many contain multiple drugs and it is very easy to accidentally take the same active ingredient from multiple sources.   May use honey in if over the age of 12 months to soothe your throat. Either swallow plain or you may gargle.   Commercial, over the counter saline nasal washes or rinses have been proven effective for sinus congestion. Saline sprays may be helpful if you can not tolerate the full sinus rinse.     Go to the emergency department with persistent, worsening, or worrisome symptoms. Some worrisome symptoms include difficulty breathing, high fever that does not respond to medications, not urinating normally (at least 4 times per day), not tolerating fluids, or change in mental status (example: confusion).         BMI  Estimated body mass index is 33.36 kg/m  as calculated from the following:    Height as of 11/29/24: 1.702 m (5' 7\").    Weight as of this encounter: 96.6 kg (213 lb).         Follow-up   No follow-ups on file.        Jeffery Orantes is a 30 year old, presenting for the following health issues:  Pharyngitis        6/9/2025     1:46 PM   Additional Questions   Roomed by Seema GANT RN   Accompanied by none     History of Present Illness       Reason for visit:  Sore throat that has lasted 2 weeks  Symptom " onset:  1-2 weeks ago  Symptoms include:  Sore throat, lots of phlem  Symptom intensity:  Moderate  Symptom progression:  Staying the same  Had these symptoms before:  Yes  Has tried/received treatment for these symptoms:  Yes  Previous treatment was successful:  Yes  Prior treatment description:  Shot of antibiotic  What makes it worse:  Swallowing after lying down  What makes it better:  Cold or hot drinks   He is taking medications regularly.        Acute Illness  Acute illness concerns: Sore throat   Onset/Duration: One week ago after taking antibiotics for sinus infection  Symptoms:  Fever: No  Chills/Sweats: No  Headache (location?): No  Sinus Pressure: No  Conjunctivitis:  No  Ear Pain: no  Rhinorrhea: YES- draining in the back  Congestion: YES- some/not bad.  Clear from nose, yellow from throat  Sore Throat: YES- 4/10, worse in the morning upon wakening  Cough: YES-productive of yellow sputum  Wheeze: No  Decreased Appetite: No  Nausea: No  Vomiting: No  Diarrhea: No  Dysuria/Freq.: No  Dysuria or Hematuria: No  Fatigue/Achiness: No  Sick/Strep Exposure: No  Therapies tried and outcome: Tylenol or ibuprofen    Lamberto presents to the office today for concerns of a sore throat that he has had for the last week.  He notes that prior to getting a sore throat he was treated for a sinus infection with Augmentin.  He continues to have a cough and he notes that he feels like it is located in his upper airway.  He denies symptoms of: fever, chills, body aches, and upset stomach.  Some symptoms of fatigue, but he recently switched from evenings to days and he think that is part of it.  He states that his sore throat has improved.          Objective    /74 (BP Location: Right arm, Patient Position: Sitting, Cuff Size: Adult Large)   Pulse 103   Temp 98.6  F (37  C) (Tympanic)   Resp 18   Wt 96.6 kg (213 lb)   SpO2 97%   BMI 33.36 kg/m    Body mass index is 33.36 kg/m .    Physical Exam   GENERAL: alert and  no distress  HENT: ear canals and TM's normal, nose and mouth without ulcers or lesions  NECK: no adenopathy, no asymmetry, masses, or scars  RESP: lungs clear to auscultation - no rales, rhonchi or wheezes  CV: regular rate and rhythm, normal S1 S2, no S3 or S4, no murmur, click or rub, no peripheral edema    Nurse Practitioner Student participated care of patient and documentation of the note with direct supervision by me on date of visit. I  attest that I have verified HPI and contributing factors and personally performed a physical exam.  Final medical decision making completed by me. Notes reviewed an approved by me, Roxanne Us, LENORA, CNP.      Papa Brownlee [facility] student [facility] [facility]  Signed Electronically by: Roxanne Us, CNP

## 2025-07-13 ENCOUNTER — HEALTH MAINTENANCE LETTER (OUTPATIENT)
Age: 30
End: 2025-07-13